# Patient Record
Sex: MALE | Race: WHITE | NOT HISPANIC OR LATINO | Employment: FULL TIME | URBAN - NONMETROPOLITAN AREA
[De-identification: names, ages, dates, MRNs, and addresses within clinical notes are randomized per-mention and may not be internally consistent; named-entity substitution may affect disease eponyms.]

---

## 2017-07-16 ENCOUNTER — HOSPITAL ENCOUNTER (EMERGENCY)
Facility: HOSPITAL | Age: 32
Discharge: HOME | End: 2017-07-16
Admitting: FAMILY MEDICINE

## 2017-07-16 VITALS
DIASTOLIC BLOOD PRESSURE: 75 MMHG | HEART RATE: 88 BPM | BODY MASS INDEX: 35.25 KG/M2 | SYSTOLIC BLOOD PRESSURE: 134 MMHG | TEMPERATURE: 97.8 F | OXYGEN SATURATION: 99 % | RESPIRATION RATE: 16 BRPM | WEIGHT: 232.6 LBS | HEIGHT: 68 IN

## 2017-07-16 DIAGNOSIS — H60.332 ACUTE SWIMMER'S EAR OF LEFT SIDE: Primary | ICD-10-CM

## 2017-07-16 PROCEDURE — 99282 EMERGENCY DEPT VISIT SF MDM: CPT

## 2017-07-16 RX ORDER — CIPROFLOXACIN AND DEXAMETHASONE 3; 1 MG/ML; MG/ML
4 SUSPENSION/ DROPS AURICULAR (OTIC) 2 TIMES DAILY
Qty: 7.5 ML | Refills: 0 | Status: SHIPPED | OUTPATIENT
Start: 2017-07-16 | End: 2017-07-23

## 2017-07-17 NOTE — ED PROVIDER NOTES
Subjective   Patient is a 31 y.o. male presenting with ear pain.   History provided by:  Patient   used: No    Earache   Location:  Left  Behind ear:  No abnormality  Quality:  Aching  Severity:  Mild  Onset quality:  Gradual  Duration:  3 days  Timing:  Constant  Progression:  Worsening  Chronicity:  New  Context: water in ear    Context: not direct blow, not elevation change, not foreign body in ear and not loud noise    Relieved by:  Nothing  Worsened by:  Nothing  Ineffective treatments:  OTC medications  Associated symptoms: ear discharge    Associated symptoms: no abdominal pain, no congestion, no cough, no diarrhea, no headaches, no hearing loss, no neck pain, no sore throat, no tinnitus and no vomiting        Review of Systems   Constitutional: Negative.    HENT: Positive for ear discharge and ear pain. Negative for congestion, hearing loss, sore throat and tinnitus.    Eyes: Negative.    Respiratory: Negative for cough.    Cardiovascular: Negative.    Gastrointestinal: Negative for abdominal pain, diarrhea and vomiting.   Endocrine: Negative.    Genitourinary: Negative.    Musculoskeletal: Negative.  Negative for neck pain.   Skin: Negative.    Allergic/Immunologic: Negative.    Neurological: Negative for headaches.   Hematological: Negative.    Psychiatric/Behavioral: Negative.        Past Medical History:   Diagnosis Date   • Kidney stone        No Known Allergies    History reviewed. No pertinent surgical history.    History reviewed. No pertinent family history.    Social History     Social History   • Marital status: Single     Spouse name: N/A   • Number of children: N/A   • Years of education: N/A     Social History Main Topics   • Smoking status: Unknown If Ever Smoked   • Smokeless tobacco: None   • Alcohol use None   • Drug use: None   • Sexual activity: Not Asked     Other Topics Concern   • None     Social History Narrative   • None           Objective   Physical Exam    Constitutional: He appears well-developed and well-nourished. No distress.   HENT:   Head: Normocephalic and atraumatic.   Right Ear: External ear normal.   Left Ear: No lacerations. There is drainage and swelling. No foreign bodies. Tympanic membrane is not perforated, not erythematous, not retracted and not bulging.   Mouth/Throat: Oropharynx is clear and moist.   Eyes: EOM are normal. Pupils are equal, round, and reactive to light.   Cardiovascular: Normal rate and regular rhythm.  Exam reveals no friction rub.    No murmur heard.  Pulmonary/Chest: Effort normal. No respiratory distress. He has no wheezes.   Neurological: He is alert.   Skin: He is not diaphoretic.   Nursing note and vitals reviewed.      Procedures         ED Course  ED Course                  MDM    Final diagnoses:   Acute swimmer's ear of left side            Higinio Martinez PA-C  07/16/17 1923

## 2021-07-22 ENCOUNTER — APPOINTMENT (OUTPATIENT)
Dept: GENERAL RADIOLOGY | Age: 36
DRG: 871 | End: 2021-07-22
Attending: INTERNAL MEDICINE
Payer: COMMERCIAL

## 2021-07-22 ENCOUNTER — HOSPITAL ENCOUNTER (INPATIENT)
Age: 36
LOS: 3 days | Discharge: ANOTHER HOSPITAL | DRG: 871 | End: 2021-07-25
Attending: INTERNAL MEDICINE
Payer: COMMERCIAL

## 2021-07-22 LAB
ALBUMIN SERPL-MCNC: 3.8 G/DL (ref 3.5–5.2)
ALP BLD-CCNC: 56 U/L (ref 40–130)
ALT SERPL-CCNC: 79 U/L (ref 5–41)
ANION GAP SERPL CALCULATED.3IONS-SCNC: 16 MMOL/L (ref 7–19)
AST SERPL-CCNC: 219 U/L (ref 5–40)
BASOPHILS ABSOLUTE: 0 K/UL (ref 0–0.2)
BASOPHILS RELATIVE PERCENT: 0.1 % (ref 0–1)
BILIRUB SERPL-MCNC: 0.4 MG/DL (ref 0.2–1.2)
BUN BLDV-MCNC: 29 MG/DL (ref 6–20)
C-REACTIVE PROTEIN: 9.82 MG/DL (ref 0–0.5)
CALCIUM SERPL-MCNC: 8.6 MG/DL (ref 8.6–10)
CHLORIDE BLD-SCNC: 95 MMOL/L (ref 98–111)
CO2: 23 MMOL/L (ref 22–29)
CREAT SERPL-MCNC: 1.7 MG/DL (ref 0.5–1.2)
EOSINOPHILS ABSOLUTE: 0 K/UL (ref 0–0.6)
EOSINOPHILS RELATIVE PERCENT: 0 % (ref 0–5)
GFR AFRICAN AMERICAN: 56
GFR NON-AFRICAN AMERICAN: 46
GLUCOSE BLD-MCNC: 166 MG/DL (ref 74–109)
HCT VFR BLD CALC: 41.7 % (ref 42–52)
HEMOGLOBIN: 14.4 G/DL (ref 14–18)
IMMATURE GRANULOCYTES #: 0.1 K/UL
LACTIC ACID: 1.6 MMOL/L (ref 0.5–1.9)
LYMPHOCYTES ABSOLUTE: 0.8 K/UL (ref 1.1–4.5)
LYMPHOCYTES RELATIVE PERCENT: 11 % (ref 20–40)
MCH RBC QN AUTO: 30.2 PG (ref 27–31)
MCHC RBC AUTO-ENTMCNC: 34.5 G/DL (ref 33–37)
MCV RBC AUTO: 87.4 FL (ref 80–94)
MONOCYTES ABSOLUTE: 0.2 K/UL (ref 0–0.9)
MONOCYTES RELATIVE PERCENT: 3.3 % (ref 0–10)
NEUTROPHILS ABSOLUTE: 6.2 K/UL (ref 1.5–7.5)
NEUTROPHILS RELATIVE PERCENT: 84.6 % (ref 50–65)
PDW BLD-RTO: 13.5 % (ref 11.5–14.5)
PLATELET # BLD: 179 K/UL (ref 130–400)
PMV BLD AUTO: 10 FL (ref 9.4–12.4)
POTASSIUM REFLEX MAGNESIUM: 4.1 MMOL/L (ref 3.5–5)
RBC # BLD: 4.77 M/UL (ref 4.7–6.1)
SODIUM BLD-SCNC: 134 MMOL/L (ref 136–145)
TOTAL PROTEIN: 7.4 G/DL (ref 6.6–8.7)
WBC # BLD: 7.3 K/UL (ref 4.8–10.8)

## 2021-07-22 PROCEDURE — 87040 BLOOD CULTURE FOR BACTERIA: CPT

## 2021-07-22 PROCEDURE — 94002 VENT MGMT INPAT INIT DAY: CPT

## 2021-07-22 PROCEDURE — 71045 X-RAY EXAM CHEST 1 VIEW: CPT

## 2021-07-22 PROCEDURE — 2700000000 HC OXYGEN THERAPY PER DAY

## 2021-07-22 PROCEDURE — 81001 URINALYSIS AUTO W/SCOPE: CPT

## 2021-07-22 PROCEDURE — 6360000002 HC RX W HCPCS: Performed by: INTERNAL MEDICINE

## 2021-07-22 PROCEDURE — 5A1935Z RESPIRATORY VENTILATION, LESS THAN 24 CONSECUTIVE HOURS: ICD-10-PCS | Performed by: STUDENT IN AN ORGANIZED HEALTH CARE EDUCATION/TRAINING PROGRAM

## 2021-07-22 PROCEDURE — 2100000000 HC CCU R&B

## 2021-07-22 RX ORDER — MIDAZOLAM IN NACL,ISO-OSMOT/PF 50 MG/50ML
1-10 INFUSION BOTTLE (ML) INTRAVENOUS CONTINUOUS
Status: DISCONTINUED | OUTPATIENT
Start: 2021-07-22 | End: 2021-07-24

## 2021-07-22 RX ORDER — PROPOFOL 10 MG/ML
5-50 INJECTION, EMULSION INTRAVENOUS
Status: DISCONTINUED | OUTPATIENT
Start: 2021-07-22 | End: 2021-07-25 | Stop reason: HOSPADM

## 2021-07-22 RX ADMIN — Medication 75 MCG/HR: at 21:50

## 2021-07-22 RX ADMIN — Medication 4 MG/HR: at 21:49

## 2021-07-22 RX ADMIN — PROPOFOL 10 MCG/KG/MIN: 10 INJECTION, EMULSION INTRAVENOUS at 22:15

## 2021-07-22 ASSESSMENT — PULMONARY FUNCTION TESTS
PIF_VALUE: 33
PIF_VALUE: 35

## 2021-07-23 PROBLEM — J80 ACUTE RESPIRATORY DISTRESS SYNDROME IN ADULT (HCC): Status: ACTIVE | Noted: 2021-07-23

## 2021-07-23 PROBLEM — N17.9 ACUTE KIDNEY INJURY (HCC): Status: ACTIVE | Noted: 2021-07-23

## 2021-07-23 PROBLEM — E66.01 MORBID EXOGENOUS OBESITY (HCC): Status: ACTIVE | Noted: 2021-07-23

## 2021-07-23 PROBLEM — J12.82 PNEUMONIA DUE TO COVID-19 VIRUS: Status: ACTIVE | Noted: 2021-07-23

## 2021-07-23 PROBLEM — R09.02 HYPOXEMIA: Status: ACTIVE | Noted: 2021-07-23

## 2021-07-23 PROBLEM — N39.0 UTI (URINARY TRACT INFECTION): Status: ACTIVE | Noted: 2021-07-23

## 2021-07-23 PROBLEM — R79.89 ELEVATED D-DIMER: Status: ACTIVE | Noted: 2021-07-23

## 2021-07-23 PROBLEM — J96.00 ACUTE RESPIRATORY FAILURE DUE TO COVID-19 (HCC): Status: ACTIVE | Noted: 2021-07-23

## 2021-07-23 PROBLEM — U07.1 ACUTE RESPIRATORY FAILURE DUE TO COVID-19 (HCC): Status: ACTIVE | Noted: 2021-07-23

## 2021-07-23 PROBLEM — U07.1 PNEUMONIA DUE TO COVID-19 VIRUS: Status: ACTIVE | Noted: 2021-07-23

## 2021-07-23 PROBLEM — Z51.5 PALLIATIVE CARE PATIENT: Status: ACTIVE | Noted: 2021-07-23

## 2021-07-23 PROBLEM — J45.909 ASTHMA: Status: ACTIVE | Noted: 2021-07-23

## 2021-07-23 LAB
ALBUMIN SERPL-MCNC: 3.3 G/DL (ref 3.5–5.2)
ALP BLD-CCNC: 51 U/L (ref 40–130)
ALT SERPL-CCNC: 73 U/L (ref 5–41)
ANION GAP SERPL CALCULATED.3IONS-SCNC: 14 MMOL/L (ref 7–19)
AST SERPL-CCNC: 204 U/L (ref 5–40)
BACTERIA: NEGATIVE /HPF
BASE EXCESS ARTERIAL: -2.9 MMOL/L (ref -2–2)
BILIRUB SERPL-MCNC: 0.4 MG/DL (ref 0.2–1.2)
BILIRUBIN URINE: ABNORMAL
BLOOD, URINE: ABNORMAL
BUN BLDV-MCNC: 35 MG/DL (ref 6–20)
CALCIUM SERPL-MCNC: 8.1 MG/DL (ref 8.6–10)
CARBOXYHEMOGLOBIN ARTERIAL: 2.1 % (ref 0–5)
CHLORIDE BLD-SCNC: 95 MMOL/L (ref 98–111)
CLARITY: ABNORMAL
CO2: 21 MMOL/L (ref 22–29)
COLOR: ABNORMAL
CREAT SERPL-MCNC: 1.7 MG/DL (ref 0.5–1.2)
CRYSTALS, UA: ABNORMAL /HPF
D DIMER: 13.54 UG/ML FEU (ref 0–0.48)
EPITHELIAL CELLS, UA: 6 /HPF (ref 0–5)
GFR AFRICAN AMERICAN: 56
GFR NON-AFRICAN AMERICAN: 46
GLUCOSE BLD-MCNC: 170 MG/DL (ref 70–99)
GLUCOSE BLD-MCNC: 171 MG/DL (ref 70–99)
GLUCOSE BLD-MCNC: 194 MG/DL (ref 70–99)
GLUCOSE BLD-MCNC: 223 MG/DL (ref 74–109)
GLUCOSE URINE: NEGATIVE MG/DL
HBA1C MFR BLD: 5.6 % (ref 4–6)
HCO3 ARTERIAL: 22 MMOL/L (ref 22–26)
HCT VFR BLD CALC: 40.1 % (ref 42–52)
HEMOGLOBIN, ART, EXTENDED: 13.9 G/DL (ref 14–18)
HEMOGLOBIN: 13.6 G/DL (ref 14–18)
HYALINE CASTS: 41 /HPF (ref 0–8)
KETONES, URINE: ABNORMAL MG/DL
L. PNEUMOPHILA SEROGP 1 UR AG: NORMAL
LEUKOCYTE ESTERASE, URINE: ABNORMAL
MCH RBC QN AUTO: 30.4 PG (ref 27–31)
MCHC RBC AUTO-ENTMCNC: 33.9 G/DL (ref 33–37)
MCV RBC AUTO: 89.5 FL (ref 80–94)
METHEMOGLOBIN ARTERIAL: 1 %
NITRITE, URINE: NEGATIVE
O2 CONTENT ARTERIAL: 17.3 ML/DL
O2 SAT, ARTERIAL: 88.6 %
O2 THERAPY: ABNORMAL
PCO2 ARTERIAL: 38 MMHG (ref 35–45)
PDW BLD-RTO: 13.7 % (ref 11.5–14.5)
PERFORMED ON: ABNORMAL
PH ARTERIAL: 7.37 (ref 7.35–7.45)
PH UA: 5 (ref 5–8)
PLATELET # BLD: 167 K/UL (ref 130–400)
PMV BLD AUTO: 10 FL (ref 9.4–12.4)
PO2 ARTERIAL: 55 MMHG (ref 80–100)
POTASSIUM SERPL-SCNC: 3.9 MMOL/L (ref 3.5–5)
POTASSIUM, WHOLE BLOOD: 3.9
PROCALCITONIN: 4.57 NG/ML (ref 0–0.09)
PROTEIN UA: 300 MG/DL
RBC # BLD: 4.48 M/UL (ref 4.7–6.1)
RBC UA: 3 /HPF (ref 0–4)
SODIUM BLD-SCNC: 130 MMOL/L (ref 136–145)
SPECIFIC GRAVITY UA: 1.02 (ref 1–1.03)
STREP PNEUMONIAE ANTIGEN, URINE: NORMAL
TOTAL PROTEIN: 6.8 G/DL (ref 6.6–8.7)
UROBILINOGEN, URINE: 1 E.U./DL
WBC # BLD: 6.5 K/UL (ref 4.8–10.8)
WBC UA: 6 /HPF (ref 0–5)

## 2021-07-23 PROCEDURE — 2700000000 HC OXYGEN THERAPY PER DAY

## 2021-07-23 PROCEDURE — 6370000000 HC RX 637 (ALT 250 FOR IP): Performed by: STUDENT IN AN ORGANIZED HEALTH CARE EDUCATION/TRAINING PROGRAM

## 2021-07-23 PROCEDURE — 36600 WITHDRAWAL OF ARTERIAL BLOOD: CPT

## 2021-07-23 PROCEDURE — 87449 NOS EACH ORGANISM AG IA: CPT

## 2021-07-23 PROCEDURE — 82947 ASSAY GLUCOSE BLOOD QUANT: CPT

## 2021-07-23 PROCEDURE — 83605 ASSAY OF LACTIC ACID: CPT

## 2021-07-23 PROCEDURE — 84145 PROCALCITONIN (PCT): CPT

## 2021-07-23 PROCEDURE — 6370000000 HC RX 637 (ALT 250 FOR IP): Performed by: INTERNAL MEDICINE

## 2021-07-23 PROCEDURE — 2580000003 HC RX 258: Performed by: INTERNAL MEDICINE

## 2021-07-23 PROCEDURE — 82803 BLOOD GASES ANY COMBINATION: CPT

## 2021-07-23 PROCEDURE — 94003 VENT MGMT INPAT SUBQ DAY: CPT

## 2021-07-23 PROCEDURE — 2500000003 HC RX 250 WO HCPCS: Performed by: INTERNAL MEDICINE

## 2021-07-23 PROCEDURE — C9113 INJ PANTOPRAZOLE SODIUM, VIA: HCPCS | Performed by: STUDENT IN AN ORGANIZED HEALTH CARE EDUCATION/TRAINING PROGRAM

## 2021-07-23 PROCEDURE — 6360000002 HC RX W HCPCS: Performed by: STUDENT IN AN ORGANIZED HEALTH CARE EDUCATION/TRAINING PROGRAM

## 2021-07-23 PROCEDURE — 85025 COMPLETE CBC W/AUTO DIFF WBC: CPT

## 2021-07-23 PROCEDURE — 87070 CULTURE OTHR SPECIMN AEROBIC: CPT

## 2021-07-23 PROCEDURE — 89220 SPUTUM SPECIMEN COLLECTION: CPT

## 2021-07-23 PROCEDURE — 87186 SC STD MICRODIL/AGAR DIL: CPT

## 2021-07-23 PROCEDURE — 85379 FIBRIN DEGRADATION QUANT: CPT

## 2021-07-23 PROCEDURE — 86140 C-REACTIVE PROTEIN: CPT

## 2021-07-23 PROCEDURE — 6360000002 HC RX W HCPCS: Performed by: INTERNAL MEDICINE

## 2021-07-23 PROCEDURE — 94640 AIRWAY INHALATION TREATMENT: CPT

## 2021-07-23 PROCEDURE — 84132 ASSAY OF SERUM POTASSIUM: CPT

## 2021-07-23 PROCEDURE — 83036 HEMOGLOBIN GLYCOSYLATED A1C: CPT

## 2021-07-23 PROCEDURE — 87205 SMEAR GRAM STAIN: CPT

## 2021-07-23 PROCEDURE — XW043H5 INTRODUCTION OF TOCILIZUMAB INTO CENTRAL VEIN, PERCUTANEOUS APPROACH, NEW TECHNOLOGY GROUP 5: ICD-10-PCS | Performed by: ANESTHESIOLOGY

## 2021-07-23 PROCEDURE — 86403 PARTICLE AGGLUT ANTBDY SCRN: CPT

## 2021-07-23 PROCEDURE — 80053 COMPREHEN METABOLIC PANEL: CPT

## 2021-07-23 PROCEDURE — 87086 URINE CULTURE/COLONY COUNT: CPT

## 2021-07-23 PROCEDURE — 85027 COMPLETE CBC AUTOMATED: CPT

## 2021-07-23 PROCEDURE — 2100000000 HC CCU R&B

## 2021-07-23 PROCEDURE — 2580000003 HC RX 258: Performed by: STUDENT IN AN ORGANIZED HEALTH CARE EDUCATION/TRAINING PROGRAM

## 2021-07-23 RX ORDER — SODIUM CHLORIDE 9 MG/ML
25 INJECTION, SOLUTION INTRAVENOUS PRN
Status: DISCONTINUED | OUTPATIENT
Start: 2021-07-23 | End: 2021-07-25 | Stop reason: HOSPADM

## 2021-07-23 RX ORDER — FAMOTIDINE 20 MG/1
20 TABLET, FILM COATED ORAL 2 TIMES DAILY
Status: ON HOLD | COMMUNITY
End: 2021-07-24 | Stop reason: HOSPADM

## 2021-07-23 RX ORDER — SODIUM CHLORIDE 0.9 % (FLUSH) 0.9 %
5-40 SYRINGE (ML) INJECTION EVERY 12 HOURS SCHEDULED
Status: DISCONTINUED | OUTPATIENT
Start: 2021-07-23 | End: 2021-07-25 | Stop reason: HOSPADM

## 2021-07-23 RX ORDER — ALBUTEROL SULFATE 90 UG/1
2 AEROSOL, METERED RESPIRATORY (INHALATION) EVERY 6 HOURS PRN
Status: DISCONTINUED | OUTPATIENT
Start: 2021-07-23 | End: 2021-07-25 | Stop reason: HOSPADM

## 2021-07-23 RX ORDER — ACETAMINOPHEN 650 MG/1
650 SUPPOSITORY RECTAL EVERY 6 HOURS PRN
Status: DISCONTINUED | OUTPATIENT
Start: 2021-07-23 | End: 2021-07-25 | Stop reason: HOSPADM

## 2021-07-23 RX ORDER — SODIUM CHLORIDE, SODIUM LACTATE, POTASSIUM CHLORIDE, AND CALCIUM CHLORIDE .6; .31; .03; .02 G/100ML; G/100ML; G/100ML; G/100ML
500 INJECTION, SOLUTION INTRAVENOUS ONCE
Status: COMPLETED | OUTPATIENT
Start: 2021-07-23 | End: 2021-07-23

## 2021-07-23 RX ORDER — DEXTROSE MONOHYDRATE 50 MG/ML
100 INJECTION, SOLUTION INTRAVENOUS PRN
Status: DISCONTINUED | OUTPATIENT
Start: 2021-07-23 | End: 2021-07-23 | Stop reason: SDUPTHER

## 2021-07-23 RX ORDER — BUDESONIDE AND FORMOTEROL FUMARATE DIHYDRATE 160; 4.5 UG/1; UG/1
2 AEROSOL RESPIRATORY (INHALATION) 2 TIMES DAILY
Status: DISCONTINUED | OUTPATIENT
Start: 2021-07-23 | End: 2021-07-24

## 2021-07-23 RX ORDER — SODIUM CHLORIDE 0.9 % (FLUSH) 0.9 %
5-40 SYRINGE (ML) INJECTION PRN
Status: DISCONTINUED | OUTPATIENT
Start: 2021-07-23 | End: 2021-07-25 | Stop reason: HOSPADM

## 2021-07-23 RX ORDER — 0.9 % SODIUM CHLORIDE 0.9 %
1000 INTRAVENOUS SOLUTION INTRAVENOUS ONCE
Status: COMPLETED | OUTPATIENT
Start: 2021-07-23 | End: 2021-07-23

## 2021-07-23 RX ORDER — ONDANSETRON 2 MG/ML
4 INJECTION INTRAMUSCULAR; INTRAVENOUS EVERY 6 HOURS PRN
Status: DISCONTINUED | OUTPATIENT
Start: 2021-07-23 | End: 2021-07-25 | Stop reason: HOSPADM

## 2021-07-23 RX ORDER — NICOTINE POLACRILEX 4 MG
15 LOZENGE BUCCAL PRN
Status: DISCONTINUED | OUTPATIENT
Start: 2021-07-23 | End: 2021-07-25 | Stop reason: HOSPADM

## 2021-07-23 RX ORDER — SODIUM CHLORIDE 9 MG/ML
10 INJECTION INTRAVENOUS DAILY
Status: DISCONTINUED | OUTPATIENT
Start: 2021-07-23 | End: 2021-07-25 | Stop reason: HOSPADM

## 2021-07-23 RX ORDER — ACETAMINOPHEN 325 MG/1
650 TABLET ORAL EVERY 6 HOURS PRN
Status: DISCONTINUED | OUTPATIENT
Start: 2021-07-23 | End: 2021-07-25 | Stop reason: HOSPADM

## 2021-07-23 RX ORDER — ALBUTEROL SULFATE 90 UG/1
2 AEROSOL, METERED RESPIRATORY (INHALATION) EVERY 4 HOURS
Status: DISCONTINUED | OUTPATIENT
Start: 2021-07-23 | End: 2021-07-23

## 2021-07-23 RX ORDER — SODIUM CHLORIDE, SODIUM LACTATE, POTASSIUM CHLORIDE, AND CALCIUM CHLORIDE .6; .31; .03; .02 G/100ML; G/100ML; G/100ML; G/100ML
500 INJECTION, SOLUTION INTRAVENOUS ONCE
Status: DISCONTINUED | OUTPATIENT
Start: 2021-07-23 | End: 2021-07-23

## 2021-07-23 RX ORDER — DEXAMETHASONE SODIUM PHOSPHATE 10 MG/ML
6 INJECTION, SOLUTION INTRAMUSCULAR; INTRAVENOUS EVERY 12 HOURS
Status: DISCONTINUED | OUTPATIENT
Start: 2021-07-23 | End: 2021-07-25 | Stop reason: HOSPADM

## 2021-07-23 RX ORDER — IPRATROPIUM BROMIDE AND ALBUTEROL SULFATE 2.5; .5 MG/3ML; MG/3ML
1 SOLUTION RESPIRATORY (INHALATION) 4 TIMES DAILY
Status: DISCONTINUED | OUTPATIENT
Start: 2021-07-23 | End: 2021-07-25 | Stop reason: HOSPADM

## 2021-07-23 RX ORDER — DEXTROSE MONOHYDRATE 25 G/50ML
12.5 INJECTION, SOLUTION INTRAVENOUS PRN
Status: DISCONTINUED | OUTPATIENT
Start: 2021-07-23 | End: 2021-07-25 | Stop reason: HOSPADM

## 2021-07-23 RX ORDER — DEXTROSE MONOHYDRATE 25 G/50ML
12.5 INJECTION, SOLUTION INTRAVENOUS PRN
Status: DISCONTINUED | OUTPATIENT
Start: 2021-07-23 | End: 2021-07-23 | Stop reason: SDUPTHER

## 2021-07-23 RX ORDER — POLYETHYLENE GLYCOL 3350 17 G/17G
17 POWDER, FOR SOLUTION ORAL DAILY PRN
Status: DISCONTINUED | OUTPATIENT
Start: 2021-07-23 | End: 2021-07-25 | Stop reason: HOSPADM

## 2021-07-23 RX ORDER — DEXAMETHASONE SODIUM PHOSPHATE 10 MG/ML
6 INJECTION, SOLUTION INTRAMUSCULAR; INTRAVENOUS EVERY 24 HOURS
Status: DISCONTINUED | OUTPATIENT
Start: 2021-07-23 | End: 2021-07-23

## 2021-07-23 RX ORDER — PANTOPRAZOLE SODIUM 40 MG/10ML
40 INJECTION, POWDER, LYOPHILIZED, FOR SOLUTION INTRAVENOUS DAILY
Status: DISCONTINUED | OUTPATIENT
Start: 2021-07-23 | End: 2021-07-25 | Stop reason: HOSPADM

## 2021-07-23 RX ORDER — NICOTINE POLACRILEX 4 MG
15 LOZENGE BUCCAL PRN
Status: DISCONTINUED | OUTPATIENT
Start: 2021-07-23 | End: 2021-07-23 | Stop reason: SDUPTHER

## 2021-07-23 RX ORDER — DEXTROSE MONOHYDRATE 50 MG/ML
100 INJECTION, SOLUTION INTRAVENOUS PRN
Status: DISCONTINUED | OUTPATIENT
Start: 2021-07-23 | End: 2021-07-25 | Stop reason: HOSPADM

## 2021-07-23 RX ADMIN — PROPOFOL 35 MCG/KG/MIN: 10 INJECTION, EMULSION INTRAVENOUS at 04:33

## 2021-07-23 RX ADMIN — PROPOFOL 50 MCG/KG/MIN: 10 INJECTION, EMULSION INTRAVENOUS at 21:00

## 2021-07-23 RX ADMIN — PROPOFOL 40 MCG/KG/MIN: 10 INJECTION, EMULSION INTRAVENOUS at 11:33

## 2021-07-23 RX ADMIN — IPRATROPIUM BROMIDE AND ALBUTEROL SULFATE 1 AMPULE: .5; 3 SOLUTION RESPIRATORY (INHALATION) at 19:58

## 2021-07-23 RX ADMIN — PROPOFOL 35 MCG/KG/MIN: 10 INJECTION, EMULSION INTRAVENOUS at 08:33

## 2021-07-23 RX ADMIN — IPRATROPIUM BROMIDE AND ALBUTEROL SULFATE 1 AMPULE: .5; 3 SOLUTION RESPIRATORY (INHALATION) at 15:50

## 2021-07-23 RX ADMIN — BUDESONIDE AND FORMOTEROL FUMARATE DIHYDRATE 2 PUFF: 160; 4.5 AEROSOL RESPIRATORY (INHALATION) at 11:26

## 2021-07-23 RX ADMIN — PIPERACILLIN AND TAZOBACTAM 3375 MG: 3; .375 INJECTION, POWDER, LYOPHILIZED, FOR SOLUTION INTRAVENOUS at 11:26

## 2021-07-23 RX ADMIN — PANTOPRAZOLE SODIUM 40 MG: 40 INJECTION, POWDER, FOR SOLUTION INTRAVENOUS at 08:48

## 2021-07-23 RX ADMIN — ALBUTEROL SULFATE 2 PUFF: 90 AEROSOL, METERED RESPIRATORY (INHALATION) at 11:27

## 2021-07-23 RX ADMIN — PIPERACILLIN AND TAZOBACTAM 3375 MG: 3; .375 INJECTION, POWDER, LYOPHILIZED, FOR SOLUTION INTRAVENOUS at 03:30

## 2021-07-23 RX ADMIN — ENOXAPARIN SODIUM 135 MG: 150 INJECTION SUBCUTANEOUS at 01:05

## 2021-07-23 RX ADMIN — SODIUM CHLORIDE, POTASSIUM CHLORIDE, SODIUM LACTATE AND CALCIUM CHLORIDE 500 ML: 600; 310; 30; 20 INJECTION, SOLUTION INTRAVENOUS at 14:04

## 2021-07-23 RX ADMIN — PIPERACILLIN AND TAZOBACTAM 3375 MG: 3; .375 INJECTION, POWDER, LYOPHILIZED, FOR SOLUTION INTRAVENOUS at 18:04

## 2021-07-23 RX ADMIN — SODIUM CHLORIDE, PRESERVATIVE FREE 10 ML: 5 INJECTION INTRAVENOUS at 21:01

## 2021-07-23 RX ADMIN — FAMOTIDINE 20 MG: 10 INJECTION, SOLUTION INTRAVENOUS at 01:05

## 2021-07-23 RX ADMIN — SODIUM CHLORIDE, PRESERVATIVE FREE 10 ML: 5 INJECTION INTRAVENOUS at 09:24

## 2021-07-23 RX ADMIN — SODIUM CHLORIDE 1000 ML: 9 INJECTION, SOLUTION INTRAVENOUS at 00:56

## 2021-07-23 RX ADMIN — PROPOFOL 45 MCG/KG/MIN: 10 INJECTION, EMULSION INTRAVENOUS at 18:24

## 2021-07-23 RX ADMIN — INSULIN LISPRO 1 UNITS: 100 INJECTION, SOLUTION INTRAVENOUS; SUBCUTANEOUS at 12:45

## 2021-07-23 RX ADMIN — PROPOFOL 45 MCG/KG/MIN: 10 INJECTION, EMULSION INTRAVENOUS at 15:33

## 2021-07-23 RX ADMIN — DEXAMETHASONE SODIUM PHOSPHATE 6 MG: 10 INJECTION, SOLUTION INTRAMUSCULAR; INTRAVENOUS at 01:05

## 2021-07-23 RX ADMIN — PROPOFOL 35 MCG/KG/MIN: 10 INJECTION, EMULSION INTRAVENOUS at 00:46

## 2021-07-23 RX ADMIN — ENOXAPARIN SODIUM 135 MG: 150 INJECTION SUBCUTANEOUS at 12:39

## 2021-07-23 RX ADMIN — AZITHROMYCIN MONOHYDRATE 500 MG: 500 INJECTION, POWDER, LYOPHILIZED, FOR SOLUTION INTRAVENOUS at 01:05

## 2021-07-23 RX ADMIN — SODIUM CHLORIDE, PRESERVATIVE FREE 10 ML: 5 INJECTION INTRAVENOUS at 08:48

## 2021-07-23 RX ADMIN — Medication 5 MG/HR: at 11:33

## 2021-07-23 RX ADMIN — TOCILIZUMAB 800 MG: 20 INJECTION, SOLUTION, CONCENTRATE INTRAVENOUS at 08:15

## 2021-07-23 RX ADMIN — DEXAMETHASONE SODIUM PHOSPHATE 6 MG: 10 INJECTION, SOLUTION INTRAMUSCULAR; INTRAVENOUS at 14:05

## 2021-07-23 RX ADMIN — PROPOFOL 50 MCG/KG/MIN: 10 INJECTION, EMULSION INTRAVENOUS at 23:36

## 2021-07-23 RX ADMIN — BUDESONIDE AND FORMOTEROL FUMARATE DIHYDRATE 2 PUFF: 160; 4.5 AEROSOL RESPIRATORY (INHALATION) at 20:00

## 2021-07-23 ASSESSMENT — PULMONARY FUNCTION TESTS
PIF_VALUE: 34
PIF_VALUE: 30
PIF_VALUE: 32
PIF_VALUE: 32
PIF_VALUE: 31
PIF_VALUE: 30
PIF_VALUE: 32
PIF_VALUE: 34
PIF_VALUE: 29
PIF_VALUE: 25
PIF_VALUE: 34
PIF_VALUE: 34
PIF_VALUE: 31
PIF_VALUE: 30
PIF_VALUE: 18
PIF_VALUE: 26
PIF_VALUE: 33
PIF_VALUE: 33
PIF_VALUE: 29
PIF_VALUE: 26
PIF_VALUE: 34
PIF_VALUE: 29
PIF_VALUE: 34
PIF_VALUE: 34
PIF_VALUE: 33
PIF_VALUE: 28
PIF_VALUE: 33
PIF_VALUE: 33
PIF_VALUE: 29

## 2021-07-23 ASSESSMENT — PAIN SCALES - GENERAL: PAINLEVEL_OUTOF10: 1

## 2021-07-23 NOTE — PLAN OF CARE
Nutrition Problem #1: Inadequate oral intake  Intervention: Food and/or Nutrient Delivery: Start Tube Feeding, Start Oral Nutrition Supplement  Nutritional Goals: meet nutritional needs through EN

## 2021-07-23 NOTE — ACP (ADVANCE CARE PLANNING)
Advance Care Planning     Advance Care Planning Activator (Inpatient)  Conversation Note      Date of ACP Conversation: 7/23/2021     [employer] with: Patient's wife and sister    ACP Activator: Mike Nix      Current Designated Health Care Decision Maker:     Primary Decision Maker: Michael Vera  [phone]    Supplemental (Other) Decision Maker: Jensen Leigh - Brother/Sister - [phone]      Care Preferences    Ventilation: \"If you were in your present state of health and suddenly became very ill and were unable to breathe on your own, what would your preference be about the use of a ventilator (breathing machine) if it were available to you? \"      Would the patient desire the use of ventilator (breathing machine)?: Yes    \"If your health worsens and it becomes clear that your chance of recovery is unlikely, what would your preference be about the use of a ventilator (breathing machine) if it were available to you? \"     Would the patient desire the use of ventilator (breathing machine)?: Yes      Resuscitation  \"CPR works best to restart the heart when there is a sudden event, like a heart attack, in someone who is otherwise healthy. Unfortunately, CPR does not typically restart the heart for people who have serious health conditions or who are very sick. \"    \"In the event your heart stopped as a result of an underlying serious health condition, would you want attempts to be made to restart your heart (answer \"yes\" for attempt to resuscitate) or would you prefer a natural death (answer \"no\" for do not attempt to resuscitate)? \" Yes       [] Yes   [x] No   Educated Patient / Asia Tejeda regarding differences between Advance Directives and portable DNR orders.         Conversation Outcomes:  [x] ACP discussion completed    Electronically signed by Mike Nix on 7/23/2021 at 11:15 AM

## 2021-07-23 NOTE — PROGRESS NOTES
Spoke with pt's wife, Louise to get consent for central line placement. Phone consent given and verified by Sanaz Hairston RN.      Electronically signed by Kristina Lizarraga RN on 7/23/2021 at 8:30 AM

## 2021-07-23 NOTE — PROGRESS NOTES
Spoke with pt's wife and sister on the phone to initiate palliative care for support. Pt is Covid positive. Pt's wife and sister says they have four in their family who have tested positive, two awaiting results, and several others with symptoms. Pt's sister says both parents of patient have tested positive. Pt was transferred from another hospital on Ventilator. Palliative to follow for support.      Electronically signed by Sherie Farah on 7/23/2021 at 11:11 AM

## 2021-07-23 NOTE — CONSULTS
**Physician Signature**  This document was electronically signed by: Krysten Santiago MD  2021   10:44 AM    **Consult Information**  Member Facility: [address] [place] [address] MRN: [id number]  Visit/Encounter Number: [id number]  Consult ID: [id number]  Facility Time Zone: CT  Date and Time of Request: 2021 09:48 AM  CT  Requesting Clinician: DR. Antonieta Toledo  Patient Name: Yudy Rosas  YOB: 1985  Gender: Male  Patient identity was confirmed at the beginning of the consult with the   patient/family/staff using two personal identifiers: Patient name and       **Reason for Consult**  Reason for Consult: [facility]    **Admission**  Admission Date: 2021  Chief reason for ICU admission: COVID - 19    **Core Metrics**  General orienting sentence for patient: 38 yo xfer from [facility] + on . Sats 40s. PaO2 now 55. ON antibiotics zithromax BAM.   remdesivir.   Fent versed and lovenox  Chief physiologic deterioration: Increase in FiO2 required by   30%  Is the patient on DVT prophylaxis?: Yes  Prophylaxis type: Pharmacological  DVT Prophylaxis Comments: Lovenox  Is the patient on GI prophylaxis?: Yes  Has this patient reached their nutritional goal?: No and issues are being   addressed  Are there current issues with pain management in this patient?:   No  Are there issues with skin integrity?: No  Are there issues with delirium?: No  Has the patient been mobilized?: No  Is this patient currently intubated?: Yes  Has facility initiated vent bundle?: Yes  Are there ethical or care philosophy or family issues?: No  Do you recommend an in depth evaluation?: No  Disposition Recommendations: Continue ICU level of care    **Inserted/Removed Devices**  Device Name: Endotracheal Tube  Site of insertion: Trachea  Date of insertion: 2021  Device Name: Coreas Catheter  Site of insertion: Urethra  Date of insertion: 2021    **Physician Signature**  This document was electronically signed by: Antonio Mayorga MD  07/23/2021   10:44 AM

## 2021-07-23 NOTE — PROGRESS NOTES
Pt received at 09:09 PM direct admit from [facility]. Patient was intubated at prior facility with a 7.5 ET tube secured at the 23cm melvi measured from the right lip. Pt placed on mechanical ventilation   VC 22  Vt 550  FIO2 100%  +10 PEEP  Without problems.

## 2021-07-23 NOTE — PROGRESS NOTES
36 yo M w/ asthma and obesity admitted with acute hypoxemic respiratory failure due to covid pneumonia, transferred from [address] [place] [address], required intubation due to respiratory distress at OSH. CXR with diffuse bl opacities with hypoxia refractory in setting of FiO2 100% and PEEP up to 12 concerning for severe ARDS. Suspected secondary bacterial infection and sepsis with elevated procal.      -Dexamethasone  -Zosyn for secondary/concomitant bacterial infection with sepsis  -added bronchodilators with some improvement in hypoxia but remains in mid-high 80s   -s/p Tocilizumab 7/23  -mechanical vent support, adjust tidal volume for low tidal volume strategy per ARDS net protocol  --P/F ratio <100, severe ARDS  --follow CXR and ABG  -adequately sedated on Propofol, Fentanyl, wean off Versed if able  -monitor hemodynamics, labs, inflammatory markers  --Trial Prone Positioning    Anticoagulate with lovenox BID per covid protocol    Total Critical Care Time:  37 minutes including chart review, discussion with nursing and RT, Vent management adjustments, management plan and frequent reassessment of response to treatment.     If no improvement, may be candidate for transfer for ECMO      Samson Picktet MD

## 2021-07-23 NOTE — CONSULTS
Body Weight:  ; No Adjustment   · Adjusted BMI:      · BMI Categories: Obese Class 3 (BMI 40.0 or greater)       Nutrition Diagnosis:   · Inadequate oral intake related to impaired respiratory function, acute injury/trauma as evidenced by NPO or clear liquid status due to medical condition, intubation, nutrition support - enteral nutrition      Nutrition Interventions:   Food and/or Nutrient Delivery:  Start Tube Feeding, Start Oral Nutrition Supplement  Nutrition Education/Counseling:  No recommendation at this time   Coordination of Nutrition Care:  Continue to monitor while inpatient    Goals:  meet nutritional needs through EN       Nutrition Monitoring and Evaluation:   Behavioral-Environmental Outcomes:  None Identified   Food/Nutrient Intake Outcomes:  Enteral Nutrition Intake/Tolerance  Physical Signs/Symptoms Outcomes:  Biochemical Data, Weight, Skin, Nutrition Focused Physical Findings     Discharge Planning:     Too soon to determine     Electronically signed by Ira Rey MS, RD, LD on 7/23/21 at 1:07 PM CDT    Contact: [phone]

## 2021-07-23 NOTE — PROGRESS NOTES
Pt O2 sats remained less than 90% for majority of today's shift. Discussed with MD. As pt is still requiring 100% FiO2, ability to tolerate prone positioning assess. Pt initiated placed in prone position with assist of RT and 4x RN's. Pt's O2 sat has been >92% since prone and have consistently read in the mid-90%s. Pt tolerating well; vitals have improved and maintained since flip. Family/wife updated on condition; educated on prone.      Electronically signed by Shawna Drake RN on 7/23/2021 at 5:57 PM

## 2021-07-23 NOTE — PROGRESS NOTES
4 Eyes Skin Assessment    Telma Olson is being assessed upon: Admission    I agree that I, Javy Kramer RN, along with Kati Fraire RN (either 2 RN's or 1 LPN and 1 RN) have performed a thorough Head to Toe Skin Assessment on the patient. ALL assessment sites listed below have been assessed. Areas assessed by both nurses:     [x]   Head, Face, and Ears   [x]   Shoulders, Back, and Chest  [x]   Arms, Elbows, and Hands   [x]   Coccyx, Sacrum, and Ischium  [x]   Legs, Feet, and Heels    Does the Patient have Skin Breakdown?  No    Glenn Prevention initiated: Yes  Wound Care Orders initiated: NA    [facility] nurse consulted for Pressure Injury (Stage 3,4, Unstageable, DTI, NWPT, and Complex wounds) and New or Established Ostomies: NA        Primary Nurse eSignature: Javy Kramer RN on 7/23/2021 at 1:36 AM      Co-Signer eSignature: Electronically signed by Bryson Live RN on 7/23/21 at 1:44 AM CDT

## 2021-07-23 NOTE — PROGRESS NOTES
Tiffany Langford arrived to room # 924 1102. Presented with: Pneumonia due to COVID  Mental Status: Patient is disoriented and alert. Vitals:    07/23/21 0034   BP:    Pulse: 78   Resp: 22   Temp:    SpO2: (!) 85%     Patient safety contract and falls prevention contract reviewed with patient Yes. Oriented Patient to room. Call light within reach. No. Unable to use, sedated on vent.    Needs, issues or concerns expressed at this time: no.        Electronically signed by Larry Bower RN on 7/23/2021 at 1:33 AM

## 2021-07-23 NOTE — PROGRESS NOTES
Pharmacy Consult      Obi Siddiqui is a 37 y.o. male for whom pharmacy has been consulted to dose Remdesivir and or Tocilizumab. Patient Active Problem List   Diagnosis    Pneumonia due to COVID-19 virus    UTI (urinary tract infection)    Morbid exogenous obesity ([facility] [address]    Asthma    Hypoxemia    Acute respiratory failure due to COVID-19 [facility])    Acute respiratory distress syndrome in adult [facility])    Elevated d-dimer    Acute kidney injury ([facility] [facility]       Allergies:  Patient has no known allergies. Recent Labs     07/22/21  2301   CREATININE 1.7*       Ht/Wt:   Ht Readings from Last 1 Encounters:   07/22/21 5' 8\" (1.727 m)        Wt Readings from Last 1 Encounters:   07/22/21 (!) 301 lb (136.5 kg)         Estimated Creatinine Clearance: 84 mL/min (A) (based on SCr of 1.7 mg/dL (H)). Assessment/Plan:    Patient direct admit from [facility] on 07/22. Patient tested positive for COVID on 07/19 per Marly Black RN. Patient met qualifications for Tocilizumab. Remdesivir would fall into the exclusion criteria. See below inclusion/exclusion criteria.      Remdesivir (RDV) Initiation    Inclusion (must meet both criteria)  1)   Adults, children, or pregnant women with proven COVID-19 requiring hospitalization for COVID-19-severe pneumonia  2)   Requiring supplemental oxygen    Exclusion Criteria (Patients who generally should not be eligible for RDV therapy initiation):  Requiring invasive or non-invasive mechanical ventilation or ECMO (not likely to have any benefit)  Use of more than 1 vasopressor agent prior to start of remdesivir       Already improving on current treatment/supportive regimen as evidenced by improving oxygenation, and/or impending discharge  Patients in whom the clinical team think death is in the immediate short-term whereby administration of RDV unlikely to change clinical outcome    Relative Exclusion Considerations  1)  Patients receiving high flow oxygen may not benefit (NEHARHSAM

## 2021-07-23 NOTE — PROGRESS NOTES
7/23/2021  4:59 AM - Loni Sim Incoming Lab Results From [facility]    Component Value Ref Range & Units Status Collected Lab   pH, Arterial 7.370  7.350 - 7.450 Final 07/23/2021  4:52 AM [facility]   pCO2, Arterial 38.0  35.0 - 45.0 mmHg Final 07/23/2021  4:52 AM [facility]   pO2, Arterial 55. 0Low   80.0 - 100.0 mmHg Final 07/23/2021  4:52 AM [facility]   HCO3, Arterial 22.0  22.0 - 26.0 mmol/L Final 07/23/2021  4:52 AM [facility] Excess, Arterial -2.9Low   -2.0 - 2.0 mmol/L Final 07/23/2021  4:52 AM [facility]   Hemoglobin, Art, Extended 13.9Low   14.0 - 18.0 g/dL Final 07/23/2021  4:52 AM [facility]   O2 Sat, Arterial 88.6Low   >92 % Final 07/23/2021  4:52 AM [facility]   Carboxyhgb, Arterial 2.1  0.0 - 5.0 % Final 07/23/2021  4:52 AM [facility]        0.0-1.5   (Smokers 1.5-5.0)    Methemoglobin, Arterial 1.0  <1.5 % Final 07/23/2021  4:52 AM [facility]   O2 Content, Arterial 17.3  Not Established mL/dL Final 07/23/2021  4:52 AM [facility]   O2 Therapy Unknown   Final 07/23      VC 22, 600, 12 PEEP, 100%  RIGHT RADIAL  ALLENS TEST POSITIVE

## 2021-07-23 NOTE — H&P
HISTORY AND PHYSICAL             Date: 7/23/2021        Patient Name: Gudelia Seo     YOB: 1985      Age:  37 y.o. Chief Complaint   Admitted with covid pneumonia and hypoxemia and respiratory distress and intubated on arrival to hospital     History Obtained From   Chart     History of Present Illness   38 yo male with minimal risk factors [facility]u bed for covid pneumonitis requiring intubation and maximal respiratory support. His only risk factors are that of asthma and exogenous obesity     He did develop sx gradually over the last few days and particularly worsened today. Past Medical History     Past Medical History:   Diagnosis Date    Asthma     Morbid exogenous obesity [facility])         Past Surgical History   History reviewed. No pertinent surgical history. Medications Prior to Admission     Prior to Admission medications    Not on File        Allergies   Patient has no allergy information on record. Social History     Social History    None         Family History   History reviewed. No pertinent family history. Review of Systems   Review of Systems   Unable to perform ROS: Intubated       Physical Exam   Pulse 78   Resp 22   Ht 5' 8\" (1.727 m)   Wt (!) 301 lb (136.5 kg)   SpO2 (!) 85%   BMI 45.77 kg/m²     Physical Exam  Constitutional:       General: He is in acute distress. Appearance: He is obese. He is ill-appearing and diaphoretic. HENT:      Mouth/Throat:      Mouth: Mucous membranes are moist.   Eyes:      Conjunctiva/sclera: Conjunctivae normal.   Cardiovascular:      Rate and Rhythm: Regular rhythm. Tachycardia present. Pulmonary:      Effort: Respiratory distress present. Breath sounds: Wheezing, rhonchi and rales present. Abdominal:      Comments: Bowel sounds hypoactive    Musculoskeletal:      Right lower leg: Edema present. Left lower leg: Edema present. Skin:     General: Skin is warm. Labs      Recent Results (from the past 24 hour(s))   C-Reactive Protein    Collection Time: 07/22/21 11:01 PM   Result Value Ref Range    CRP 9.82 (H) 0.00 - 0.50 mg/dL   CBC Auto Differential    Collection Time: 07/22/21 11:01 PM   Result Value Ref Range    WBC 7.3 4.8 - 10.8 K/uL    RBC 4.77 4.70 - 6.10 M/uL    Hemoglobin 14.4 14.0 - 18.0 g/dL    Hematocrit 41.7 (L) 42.0 - 52.0 %    MCV 87.4 80.0 - 94.0 fL    MCH 30.2 27.0 - 31.0 pg    MCHC 34.5 33.0 - 37.0 g/dL    RDW 13.5 11.5 - 14.5 %    Platelets 187 235 - 827 K/uL    MPV 10.0 9.4 - 12.4 fL    Neutrophils % 84.6 (H) 50.0 - 65.0 %    Lymphocytes % 11.0 (L) 20.0 - 40.0 %    Monocytes % 3.3 0.0 - 10.0 %    Eosinophils % 0.0 0.0 - 5.0 %    Basophils % 0.1 0.0 - 1.0 %    Neutrophils Absolute 6.2 1.5 - 7.5 K/uL    Immature Granulocytes # 0.1 K/uL    Lymphocytes Absolute 0.8 (L) 1.1 - 4.5 K/uL    Monocytes Absolute 0.20 0.00 - 0.90 K/uL    Eosinophils Absolute 0.00 0.00 - 0.60 K/uL    Basophils Absolute 0.00 0.00 - 0.20 K/uL   Comprehensive Metabolic Panel w/ Reflex to MG    Collection Time: 07/22/21 11:01 PM   Result Value Ref Range    Sodium 134 (L) 136 - 145 mmol/L    Potassium reflex Magnesium 4.1 3.5 - 5.0 mmol/L    Chloride 95 (L) 98 - 111 mmol/L    CO2 23 22 - 29 mmol/L    Anion Gap 16 7 - 19 mmol/L    Glucose 166 (H) 74 - 109 mg/dL    BUN 29 (H) 6 - 20 mg/dL    CREATININE 1.7 (H) 0.5 - 1.2 mg/dL    GFR Non- 46 (A) >60    GFR  56 (L) >59    Calcium 8.6 8.6 - 10.0 mg/dL    Total Protein 7.4 6.6 - 8.7 g/dL    Albumin 3.8 3.5 - 5.2 g/dL    Total Bilirubin 0.4 0.2 - 1.2 mg/dL    Alkaline Phosphatase 56 40 - 130 U/L    ALT 79 (H) 5 - 41 U/L     (H) 5 - 40 U/L   D-Dimer, Quantitative    Collection Time: 07/22/21 11:01 PM   Result Value Ref Range    D-Dimer, Quant 13.54 (H) 0.00 - 0.48 ug/mL FEU   Lactic Acid, Plasma    Collection Time: 07/22/21 11:01 PM   Result Value Ref Range    Lactic Acid 1.6 0.5 - 1.9 mmol/L Imaging/Diagnostics Last 24 Hours   No results found. Assessment      Hospital Problems         Last Modified POA    * (Principal) Pneumonia due to COVID-19 virus 7/23/2021 Yes    UTI (urinary tract infection) 7/23/2021 Yes    Morbid exogenous obesity ([facility] [address] 7/23/2021 Yes    Asthma 7/23/2021 Yes    Hypoxemia 7/23/2021 Yes    Acute respiratory failure due to COVID-19 ([facility] [address] 7/23/2021 Yes    Acute respiratory distress syndrome in adult [facility]) 7/23/2021 Yes    Elevated d-dimer 7/23/2021 Yes    Acute kidney injury ([facility] [address] 7/23/2021 Yes          Plan   Patient being admitted due to acute covid pneumonia with respiratory failure and hypoxemia and requiring ventilator support. Transferred from [facility] management   Steroids   Dexamethasone   Pharmacy consultation for toculizumab   And he qualifies  [facility]   Additional being covered for bacterial super infection     Morbid exogenous obesity     Asthma     Acute kidney injury     Fluid bolus     dvt full dose due to d dimer elevation and patient not stable enough to go to scans for now     Moderate sirs criteria met  No kael evidence of sepsis       Being admitted also due to acute onset of respiratory failure with hypoxemia and hypercapnea and may have sleep apnea and / or yenny at baseline and once acute situation stabilized   Then we can address this outpatient.    Consultations Ordered:  IP CONSULT TO DIETITIAN  IP CONSULT TO PULMONOLOGY  IP CONSULT TO PHARMACY    Electronically signed by Alejandro Roque MD on 7/23/21 at 12:30 AM CDT

## 2021-07-24 ENCOUNTER — APPOINTMENT (OUTPATIENT)
Dept: GENERAL RADIOLOGY | Age: 36
DRG: 871 | End: 2021-07-24
Attending: INTERNAL MEDICINE
Payer: COMMERCIAL

## 2021-07-24 VITALS
DIASTOLIC BLOOD PRESSURE: 83 MMHG | WEIGHT: 301 LBS | RESPIRATION RATE: 24 BRPM | BODY MASS INDEX: 45.62 KG/M2 | HEART RATE: 74 BPM | TEMPERATURE: 96.5 F | HEIGHT: 68 IN | OXYGEN SATURATION: 97 % | SYSTOLIC BLOOD PRESSURE: 142 MMHG

## 2021-07-24 LAB
ALBUMIN SERPL-MCNC: 3.2 G/DL (ref 3.5–5.2)
ALP BLD-CCNC: 56 U/L (ref 40–130)
ALT SERPL-CCNC: 75 U/L (ref 5–41)
ANION GAP SERPL CALCULATED.3IONS-SCNC: 17 MMOL/L (ref 7–19)
AST SERPL-CCNC: 164 U/L (ref 5–40)
BASE EXCESS ARTERIAL: -2.3 MMOL/L (ref -2–2)
BASE EXCESS ARTERIAL: -3.2 MMOL/L (ref -2–2)
BASE EXCESS ARTERIAL: -3.3 MMOL/L (ref -2–2)
BASE EXCESS ARTERIAL: -3.9 MMOL/L (ref -2–2)
BASOPHILS ABSOLUTE: 0 K/UL (ref 0–0.2)
BASOPHILS RELATIVE PERCENT: 0.3 % (ref 0–1)
BILIRUB SERPL-MCNC: 0.5 MG/DL (ref 0.2–1.2)
BUN BLDV-MCNC: 39 MG/DL (ref 6–20)
CALCIUM SERPL-MCNC: 8.6 MG/DL (ref 8.6–10)
CARBOXYHEMOGLOBIN ARTERIAL: 2.1 % (ref 0–5)
CARBOXYHEMOGLOBIN ARTERIAL: 2.4 % (ref 0–5)
CARBOXYHEMOGLOBIN ARTERIAL: 2.5 % (ref 0–5)
CARBOXYHEMOGLOBIN ARTERIAL: 2.5 % (ref 0–5)
CHLORIDE BLD-SCNC: 96 MMOL/L (ref 98–111)
CO2: 21 MMOL/L (ref 22–29)
CREAT SERPL-MCNC: 1.6 MG/DL (ref 0.5–1.2)
EOSINOPHILS ABSOLUTE: 0 K/UL (ref 0–0.6)
EOSINOPHILS RELATIVE PERCENT: 0 % (ref 0–5)
GFR AFRICAN AMERICAN: >59
GFR NON-AFRICAN AMERICAN: 50
GLUCOSE BLD-MCNC: 167 MG/DL (ref 70–99)
GLUCOSE BLD-MCNC: 174 MG/DL (ref 74–109)
GLUCOSE BLD-MCNC: 190 MG/DL (ref 70–99)
GLUCOSE BLD-MCNC: 234 MG/DL (ref 70–99)
HCO3 ARTERIAL: 22.7 MMOL/L (ref 22–26)
HCO3 ARTERIAL: 23.2 MMOL/L (ref 22–26)
HCO3 ARTERIAL: 24.7 MMOL/L (ref 22–26)
HCO3 ARTERIAL: 25 MMOL/L (ref 22–26)
HCT VFR BLD CALC: 43.9 % (ref 42–52)
HEMOGLOBIN, ART, EXTENDED: 12.9 G/DL (ref 14–18)
HEMOGLOBIN, ART, EXTENDED: 14.2 G/DL (ref 14–18)
HEMOGLOBIN, ART, EXTENDED: 14.4 G/DL (ref 14–18)
HEMOGLOBIN, ART, EXTENDED: 14.9 G/DL (ref 14–18)
HEMOGLOBIN: 15.3 G/DL (ref 14–18)
IMMATURE GRANULOCYTES #: 0.1 K/UL
LYMPHOCYTES ABSOLUTE: 1.1 K/UL (ref 1.1–4.5)
LYMPHOCYTES RELATIVE PERCENT: 14.6 % (ref 20–40)
MCH RBC QN AUTO: 31.3 PG (ref 27–31)
MCHC RBC AUTO-ENTMCNC: 34.9 G/DL (ref 33–37)
MCV RBC AUTO: 89.8 FL (ref 80–94)
METHEMOGLOBIN ARTERIAL: 1.7 %
METHEMOGLOBIN ARTERIAL: 1.8 %
METHEMOGLOBIN ARTERIAL: 2 %
METHEMOGLOBIN ARTERIAL: 2 %
MONOCYTES ABSOLUTE: 0.4 K/UL (ref 0–0.9)
MONOCYTES RELATIVE PERCENT: 4.9 % (ref 0–10)
NEUTROPHILS ABSOLUTE: 6 K/UL (ref 1.5–7.5)
NEUTROPHILS RELATIVE PERCENT: 78.4 % (ref 50–65)
O2 CONTENT ARTERIAL: 16.9 ML/DL
O2 CONTENT ARTERIAL: 18.2 ML/DL
O2 CONTENT ARTERIAL: 18.9 ML/DL
O2 CONTENT ARTERIAL: 20 ML/DL
O2 SAT, ARTERIAL: 91.1 %
O2 SAT, ARTERIAL: 92.8 %
O2 SAT, ARTERIAL: 93 %
O2 SAT, ARTERIAL: 95.1 %
O2 THERAPY: ABNORMAL
PCO2 ARTERIAL: 43 MMHG (ref 35–45)
PCO2 ARTERIAL: 46 MMHG (ref 35–45)
PCO2 ARTERIAL: 52 MMHG (ref 35–45)
PCO2 ARTERIAL: 59 MMHG (ref 35–45)
PDW BLD-RTO: 14 % (ref 11.5–14.5)
PERFORMED ON: ABNORMAL
PH ARTERIAL: 7.23 (ref 7.35–7.45)
PH ARTERIAL: 7.29 (ref 7.35–7.45)
PH ARTERIAL: 7.31 (ref 7.35–7.45)
PH ARTERIAL: 7.33 (ref 7.35–7.45)
PLATELET # BLD: 222 K/UL (ref 130–400)
PMV BLD AUTO: 10.8 FL (ref 9.4–12.4)
PO2 ARTERIAL: 70 MMHG (ref 80–100)
PO2 ARTERIAL: 71 MMHG (ref 80–100)
PO2 ARTERIAL: 78 MMHG (ref 80–100)
PO2 ARTERIAL: 88 MMHG (ref 80–100)
POTASSIUM REFLEX MAGNESIUM: 4.4 MMOL/L (ref 3.5–5)
POTASSIUM, WHOLE BLOOD: 4.2
POTASSIUM, WHOLE BLOOD: 4.3
POTASSIUM, WHOLE BLOOD: 4.3
POTASSIUM, WHOLE BLOOD: 4.4
RBC # BLD: 4.89 M/UL (ref 4.7–6.1)
SODIUM BLD-SCNC: 134 MMOL/L (ref 136–145)
TOTAL PROTEIN: 7.4 G/DL (ref 6.6–8.7)
WBC # BLD: 7.7 K/UL (ref 4.8–10.8)

## 2021-07-24 PROCEDURE — 94003 VENT MGMT INPAT SUBQ DAY: CPT

## 2021-07-24 PROCEDURE — 6370000000 HC RX 637 (ALT 250 FOR IP): Performed by: STUDENT IN AN ORGANIZED HEALTH CARE EDUCATION/TRAINING PROGRAM

## 2021-07-24 PROCEDURE — 31720 CLEARANCE OF AIRWAYS: CPT

## 2021-07-24 PROCEDURE — 84132 ASSAY OF SERUM POTASSIUM: CPT

## 2021-07-24 PROCEDURE — 36600 WITHDRAWAL OF ARTERIAL BLOOD: CPT

## 2021-07-24 PROCEDURE — 82803 BLOOD GASES ANY COMBINATION: CPT

## 2021-07-24 PROCEDURE — 80053 COMPREHEN METABOLIC PANEL: CPT

## 2021-07-24 PROCEDURE — 6360000002 HC RX W HCPCS: Performed by: STUDENT IN AN ORGANIZED HEALTH CARE EDUCATION/TRAINING PROGRAM

## 2021-07-24 PROCEDURE — 2580000003 HC RX 258: Performed by: INTERNAL MEDICINE

## 2021-07-24 PROCEDURE — 6360000002 HC RX W HCPCS: Performed by: INTERNAL MEDICINE

## 2021-07-24 PROCEDURE — 2100000000 HC CCU R&B

## 2021-07-24 PROCEDURE — 82947 ASSAY GLUCOSE BLOOD QUANT: CPT

## 2021-07-24 PROCEDURE — 85025 COMPLETE CBC W/AUTO DIFF WBC: CPT

## 2021-07-24 PROCEDURE — 2580000003 HC RX 258: Performed by: STUDENT IN AN ORGANIZED HEALTH CARE EDUCATION/TRAINING PROGRAM

## 2021-07-24 PROCEDURE — 2500000003 HC RX 250 WO HCPCS: Performed by: STUDENT IN AN ORGANIZED HEALTH CARE EDUCATION/TRAINING PROGRAM

## 2021-07-24 PROCEDURE — 94640 AIRWAY INHALATION TREATMENT: CPT

## 2021-07-24 PROCEDURE — C9113 INJ PANTOPRAZOLE SODIUM, VIA: HCPCS | Performed by: STUDENT IN AN ORGANIZED HEALTH CARE EDUCATION/TRAINING PROGRAM

## 2021-07-24 PROCEDURE — 2700000000 HC OXYGEN THERAPY PER DAY

## 2021-07-24 PROCEDURE — 71045 X-RAY EXAM CHEST 1 VIEW: CPT

## 2021-07-24 PROCEDURE — 2580000003 HC RX 258

## 2021-07-24 PROCEDURE — 2500000003 HC RX 250 WO HCPCS

## 2021-07-24 RX ORDER — HEPARIN SODIUM 1000 [USP'U]/ML
5000 INJECTION, SOLUTION INTRAVENOUS; SUBCUTANEOUS ONCE
Status: COMPLETED | OUTPATIENT
Start: 2021-07-24 | End: 2021-07-24

## 2021-07-24 RX ORDER — BUDESONIDE 0.5 MG/2ML
0.5 INHALANT ORAL 2 TIMES DAILY
Status: DISCONTINUED | OUTPATIENT
Start: 2021-07-24 | End: 2021-07-25 | Stop reason: HOSPADM

## 2021-07-24 RX ORDER — BUMETANIDE 0.25 MG/ML
1 INJECTION, SOLUTION INTRAMUSCULAR; INTRAVENOUS ONCE
Status: COMPLETED | OUTPATIENT
Start: 2021-07-24 | End: 2021-07-24

## 2021-07-24 RX ORDER — CALCIUM CHLORIDE 100 MG/ML
INJECTION INTRAVENOUS; INTRAVENTRICULAR
Status: COMPLETED
Start: 2021-07-24 | End: 2021-07-24

## 2021-07-24 RX ORDER — CALCIUM CHLORIDE 100 MG/ML
1000 INJECTION INTRAVENOUS; INTRAVENTRICULAR ONCE
Status: COMPLETED | OUTPATIENT
Start: 2021-07-24 | End: 2021-07-24

## 2021-07-24 RX ORDER — EPINEPHRINE 0.1 MG/ML
1 SYRINGE (ML) INJECTION ONCE
Status: COMPLETED | OUTPATIENT
Start: 2021-07-24 | End: 2021-07-24

## 2021-07-24 RX ORDER — MIDAZOLAM HYDROCHLORIDE 1 MG/ML
1 INJECTION INTRAMUSCULAR; INTRAVENOUS PRN
Status: DISCONTINUED | OUTPATIENT
Start: 2021-07-24 | End: 2021-07-25 | Stop reason: HOSPADM

## 2021-07-24 RX ORDER — VECURONIUM BROMIDE 1 MG/ML
10 INJECTION, POWDER, LYOPHILIZED, FOR SOLUTION INTRAVENOUS ONCE
Status: COMPLETED | OUTPATIENT
Start: 2021-07-24 | End: 2021-07-24

## 2021-07-24 RX ORDER — NOREPINEPHRINE BIT/0.9 % NACL 16MG/250ML
2-100 INFUSION BOTTLE (ML) INTRAVENOUS CONTINUOUS
Status: DISCONTINUED | OUTPATIENT
Start: 2021-07-24 | End: 2021-07-25 | Stop reason: HOSPADM

## 2021-07-24 RX ADMIN — SODIUM CHLORIDE, PRESERVATIVE FREE 10 ML: 5 INJECTION INTRAVENOUS at 09:42

## 2021-07-24 RX ADMIN — PROPOFOL 60 MCG/KG/MIN: 10 INJECTION, EMULSION INTRAVENOUS at 07:52

## 2021-07-24 RX ADMIN — IPRATROPIUM BROMIDE AND ALBUTEROL SULFATE 1 AMPULE: .5; 3 SOLUTION RESPIRATORY (INHALATION) at 20:37

## 2021-07-24 RX ADMIN — PIPERACILLIN AND TAZOBACTAM 3375 MG: 3; .375 INJECTION, POWDER, LYOPHILIZED, FOR SOLUTION INTRAVENOUS at 17:45

## 2021-07-24 RX ADMIN — IPRATROPIUM BROMIDE AND ALBUTEROL SULFATE 1 AMPULE: .5; 3 SOLUTION RESPIRATORY (INHALATION) at 14:02

## 2021-07-24 RX ADMIN — PIPERACILLIN AND TAZOBACTAM 3375 MG: 3; .375 INJECTION, POWDER, LYOPHILIZED, FOR SOLUTION INTRAVENOUS at 02:45

## 2021-07-24 RX ADMIN — PROPOFOL 60 MCG/KG/MIN: 10 INJECTION, EMULSION INTRAVENOUS at 15:41

## 2021-07-24 RX ADMIN — PROPOFOL 60 MCG/KG/MIN: 10 INJECTION, EMULSION INTRAVENOUS at 12:37

## 2021-07-24 RX ADMIN — BUMETANIDE 1 MG: 0.25 INJECTION, SOLUTION INTRAMUSCULAR; INTRAVENOUS at 09:22

## 2021-07-24 RX ADMIN — DEXAMETHASONE SODIUM PHOSPHATE 6 MG: 10 INJECTION, SOLUTION INTRAMUSCULAR; INTRAVENOUS at 01:43

## 2021-07-24 RX ADMIN — PROPOFOL 60 MCG/KG/MIN: 10 INJECTION, EMULSION INTRAVENOUS at 22:49

## 2021-07-24 RX ADMIN — PROPOFOL 60 MCG/KG/MIN: 10 INJECTION, EMULSION INTRAVENOUS at 10:59

## 2021-07-24 RX ADMIN — PANTOPRAZOLE SODIUM 40 MG: 40 INJECTION, POWDER, FOR SOLUTION INTRAVENOUS at 09:22

## 2021-07-24 RX ADMIN — Medication 250 MCG/HR: at 20:37

## 2021-07-24 RX ADMIN — SODIUM CHLORIDE 0.2 MG/KG/HR: 9 INJECTION, SOLUTION INTRAVENOUS at 21:34

## 2021-07-24 RX ADMIN — HEPARIN SODIUM 5000 UNITS: 1000 INJECTION INTRAVENOUS; SUBCUTANEOUS at 20:38

## 2021-07-24 RX ADMIN — CALCIUM CHLORIDE 1000 MG: 100 INJECTION, SOLUTION INTRAVENOUS at 23:29

## 2021-07-24 RX ADMIN — CALCIUM CHLORIDE 1000 MG: 100 INJECTION INTRAVENOUS; INTRAVENTRICULAR at 23:29

## 2021-07-24 RX ADMIN — VECURONIUM BROMIDE 10 MG: 1 INJECTION, POWDER, LYOPHILIZED, FOR SOLUTION INTRAVENOUS at 15:48

## 2021-07-24 RX ADMIN — PROPOFOL 60 MCG/KG/MIN: 10 INJECTION, EMULSION INTRAVENOUS at 20:37

## 2021-07-24 RX ADMIN — IPRATROPIUM BROMIDE AND ALBUTEROL SULFATE 1 AMPULE: .5; 3 SOLUTION RESPIRATORY (INHALATION) at 17:45

## 2021-07-24 RX ADMIN — ENOXAPARIN SODIUM 135 MG: 150 INJECTION SUBCUTANEOUS at 01:44

## 2021-07-24 RX ADMIN — DEXAMETHASONE SODIUM PHOSPHATE 6 MG: 10 INJECTION, SOLUTION INTRAMUSCULAR; INTRAVENOUS at 14:03

## 2021-07-24 RX ADMIN — PROPOFOL 60 MCG/KG/MIN: 10 INJECTION, EMULSION INTRAVENOUS at 14:16

## 2021-07-24 RX ADMIN — PIPERACILLIN AND TAZOBACTAM 3375 MG: 3; .375 INJECTION, POWDER, LYOPHILIZED, FOR SOLUTION INTRAVENOUS at 10:21

## 2021-07-24 RX ADMIN — SODIUM CHLORIDE 0.2 MG/KG/HR: 9 INJECTION, SOLUTION INTRAVENOUS at 14:03

## 2021-07-24 RX ADMIN — ENOXAPARIN SODIUM 135 MG: 150 INJECTION SUBCUTANEOUS at 14:03

## 2021-07-24 RX ADMIN — PROPOFOL 60 MCG/KG/MIN: 10 INJECTION, EMULSION INTRAVENOUS at 19:04

## 2021-07-24 RX ADMIN — IPRATROPIUM BROMIDE AND ALBUTEROL SULFATE 1 AMPULE: .5; 3 SOLUTION RESPIRATORY (INHALATION) at 09:22

## 2021-07-24 RX ADMIN — PROPOFOL 50 MCG/KG/MIN: 10 INJECTION, EMULSION INTRAVENOUS at 04:45

## 2021-07-24 RX ADMIN — SODIUM CHLORIDE, PRESERVATIVE FREE 10 ML: 5 INJECTION INTRAVENOUS at 09:23

## 2021-07-24 RX ADMIN — BUDESONIDE 500 MCG: 0.5 SUSPENSION RESPIRATORY (INHALATION) at 20:37

## 2021-07-24 RX ADMIN — Medication 2 MCG/MIN: at 21:33

## 2021-07-24 RX ADMIN — WATER 10 ML: 1 INJECTION INTRAMUSCULAR; INTRAVENOUS; SUBCUTANEOUS at 15:49

## 2021-07-24 RX ADMIN — PROPOFOL 60 MCG/KG/MIN: 10 INJECTION, EMULSION INTRAVENOUS at 20:47

## 2021-07-24 RX ADMIN — CALCIUM CHLORIDE INJECTION 1000 MG: 100 INJECTION, SOLUTION INTRAVENOUS at 20:39

## 2021-07-24 RX ADMIN — PROPOFOL 60 MCG/KG/MIN: 10 INJECTION, EMULSION INTRAVENOUS at 21:33

## 2021-07-24 RX ADMIN — MIDAZOLAM 1 MG: 1 INJECTION INTRAMUSCULAR; INTRAVENOUS at 19:17

## 2021-07-24 RX ADMIN — Medication 125 MCG/HR: at 00:16

## 2021-07-24 RX ADMIN — BUDESONIDE 500 MCG: 0.5 SUSPENSION RESPIRATORY (INHALATION) at 09:41

## 2021-07-24 RX ADMIN — SODIUM CHLORIDE, PRESERVATIVE FREE 10 ML: 5 INJECTION INTRAVENOUS at 20:39

## 2021-07-24 RX ADMIN — Medication 12 MCG/MIN: at 23:57

## 2021-07-24 RX ADMIN — EPINEPHRINE 1 MG: 0.1 INJECTION, SOLUTION ENDOTRACHEAL; INTRACARDIAC; INTRAVENOUS at 20:38

## 2021-07-24 ASSESSMENT — PULMONARY FUNCTION TESTS
PIF_VALUE: 35
PIF_VALUE: 21
PIF_VALUE: 32
PIF_VALUE: 32
PIF_VALUE: 31
PIF_VALUE: 34
PIF_VALUE: 31
PIF_VALUE: 28
PIF_VALUE: 21
PIF_VALUE: 35
PIF_VALUE: 30
PIF_VALUE: 36
PIF_VALUE: 35
PIF_VALUE: 34
PIF_VALUE: 34
PIF_VALUE: 30
PIF_VALUE: 20
PIF_VALUE: 32
PIF_VALUE: 29
PIF_VALUE: 30
PIF_VALUE: 26
PIF_VALUE: 34
PIF_VALUE: 38
PIF_VALUE: 30
PIF_VALUE: 33
PIF_VALUE: 29
PIF_VALUE: 28
PIF_VALUE: 36

## 2021-07-24 ASSESSMENT — PAIN SCALES - GENERAL
PAINLEVEL_OUTOF10: 0
PAINLEVEL_OUTOF10: 0

## 2021-07-24 NOTE — PROGRESS NOTES
Results for Estevan Maddox (MRN [id number]) as of 7/24/2021 16:24   Ref.  Range 7/24/2021 16:21   Hemoglobin, Art, Extended Latest Ref Range: 14.0 - 18.0 g/dL 14.2   pH, Arterial Latest Ref Range: 7.350 - 7.450  7.230 (LL)   pCO2, Arterial Latest Ref Range: 35.0 - 45.0 mmHg 59.0 (H)   pO2, Arterial Latest Ref Range: 80.0 - 100.0 mmHg 70.0 (L)   HCO3, Arterial Latest Ref Range: 22.0 - 26.0 mmol/L 24.7   Base Excess, Arterial Latest Ref Range: -2.0 - 2.0 mmol/L -3.9 (L)   O2 Sat, Arterial Latest Ref Range: >92 % 91.1   O2 Content, Arterial Latest Ref Range: Not Established mL/dL 18.2   Methemoglobin, Arterial Latest Ref Range: <1.5 % 2.0   Carboxyhgb, Arterial Latest Ref Range: 0.0 - 5.0 % 2.5   VC, 26 450 100% +12  ABG at LR

## 2021-07-24 NOTE — ADT AUTH CERT
[address]  [address]  [address] [place] [address]   [id number]  [id number]  Auth number: N/A     [id number] - ([address])    Return Contact Information:   Name: Kareem Menard  Phone: [phone]  Fax:  [phone]    [facility] RECEIPT    Chana Grier MD                NPI: [id number]  [address] [place] [address], [place] [address]    (F) [phone]  (M) [phone]    DIAGNOSIS CODE:  U07.1, J12.82

## 2021-07-24 NOTE — CONSULTS
**Physician Signature**  This document was electronically signed by: Yue Whitley MD  2021   10:42 AM    **Consult Information**  Member Facility: [address] [place] [address] MRN: [id number]  Visit/Encounter Number: [id number]  Consult ID: [id number]  Facility Time Zone: CT  Date and Time of Request: 2021 05:54 AM  CT  Requesting Clinician: DR. Josee Negro  Patient Name: Farhana Martinez  YOB: 1985  Gender: Male  Patient identity was confirmed at the beginning of the consult with the   patient/family/staff using two personal identifiers: Patient name and       **Reason for Consult**  Reason for Consult: [facility]    **Admission**  Admission Date: 2021  Chief reason for ICU admission: COVID - 19    **Core Metrics**  General orienting sentence for patient: 38 yo xfer from [facility] + on . Sats 40s. PaO2 now 55. ON antibiotics zithromax BAM.   remdesivir. Fent versed and lovenox 60s.   I suggested add ketamine and   change to dilaudid  Chief physiologic deterioration: Increase in FiO2 required by   30%  Is the patient on DVT prophylaxis?: Yes  Prophylaxis type: Pharmacological  DVT Prophylaxis Comments: Lovenox  Is the patient on GI prophylaxis?: Yes  Has this patient reached their nutritional goal?: No and issues are being   addressed  Are there current issues with pain management in this patient?:   No  Are there issues with skin integrity?: No  Are there issues with delirium?: No  Has the patient been mobilized?: No  Is this patient currently intubated?: Yes  Has facility initiated vent bundle?: Yes  Are there ethical or care philosophy or family issues?: No  Do you recommend an in depth evaluation?: No  Disposition Recommendations: Continue ICU level of care    **Inserted/Removed Devices**  Device Name: Endotracheal Tube  Site of insertion: Trachea  Date of insertion: 2021  Device Name: Coreas Catheter  Site of insertion: Urethra  Date of insertion: 07-    **Physician Signature**  This document was electronically signed by: Basia Zayas MD  07/24/2021   10:42 AM

## 2021-07-24 NOTE — DISCHARGE SUMMARY
Discharge Summary    NAME: Elaina Villegas  :  1985  MRN:  [id number]    Admit date:  2021  Discharge date:  2021    Advance Directive: Full Code    Primary Care Physician:  No primary care provider on file. Discharge Diagnoses:      Severe ARDS    Pneumonia due to COVID-19 virus    Acute hypoxemic respiratory failure due to COVID-19 [facility])    Elevated d-dimer    Acute kidney injury ([facility] [address]     Morbid exogenous obesity (HCC)    Asthma      Significant Diagnostic Studies:   XR CHEST PORTABLE    Result Date: 2021  XR CHEST PORTABLE 2021 11:25 PM HISTORY: Mechanical ventilation, lines and tubes Comparison: None Findings: Endotracheal tube is in good position. Tip projects 3.8 cm above the kathryn. Enteric tube courses below the diaphragm with tip beyond the field-of-view. Bilateral, fairly diffuse patchy pulmonary infiltrates. Developing consolidation in the left base. No pleural effusion or pneumothorax. Heart size is normal. There may be a mild central pulmonary congestion. Impression: 1. Endotracheal tube and enteric tube are in good position. 2. Bilateral, fairly diffuse bilateral pulmonary infiltrates in the setting of Covid 19 pneumonia. Signed by Dr Beena Loyola      Pertinent Labs:   CBC:   Recent Labs     21  2301 21  0446 21  0359   WBC 7.3 6.5 7.7   HGB 14.4 13.6* 15.3    167 222     BMP:    Recent Labs       0000 21  2301 21  0446 21  0452 21  0359 21  0420 21  1414 21  1621   NA  --  134* 130*  --  134*  --   --   --    K   < > 4.1 3.9   < > 4.4 4.3 4.3 4.4   CL  --  95* 95*  --  96*  --   --   --    CO2  --   21*  --  21*  --   --   --    BUN  --  29* 35*  --  39*  --   --   --    CREATININE  --  1.7* 1.7*  --  1.6*  --   --   --    GLUCOSE  --  166* 223*  --  174*  --   --   --     < > = values in this interval not displayed. INR: No results for input(s): INR in the last 72 hours.   Lipids: No results for input(s): CHOL, HDL in the last 72 hours. Invalid input(s): LDLCALCU  ABGs:  Recent Labs     07/23/21  0452 07/24/21  0420 07/24/21  1414 07/24/21  1621   PHART 7.370 7.330* 7.290* 7.230*   ZLD8VSP 38.0 43.0 52.0* 59.0*   PO2ART 55.0* 88.0 78.0* 70.0*   RXG8XWJ 22.0 22.7 25.0 24.7   BEART -2.9* -3.3* -2.3* -3.9*   HGBAE 13.9* 14.9 14.4 14.2   E6AZOFQN 88.6* 95.1 93.0 91.1   CARBOXHGBART 2.1 2.5 2.4 2.5   02THERAPY Unknown Unknown Unknown Unknown     HgBA1c:    Recent Labs     07/23/21  0446   LABA1C 5.6               Hospital Course:  38 yo M w/ asthma and morbid obesity admitted with acute hypoxemic respiratory failure due to covid pneumonia, transferred from [facility] in [place], required intubation due to respiratory distress at OSH on 7/22.  CXR with diffuse bl opacities with refractory hypoxia in setting of FiO2 100% and PEEP up to 12 with P/F ratio less than 100 consistent with severe ARDS. Suspected secondary bacterial infection and sepsis with elevated procal and inflammatory markers. Treated with Zosyn, dexamethasone, and Tocilizumab.  Markedly elevated D-dimer in setting of severe Covid infection, CTA chest precluded due to renal function. Suspected DARIN possibly from ATN, renal septic injury, however unknown baseline. Patient empirically therapeutically anticoagulated. Only minimal improvement in respiratory status with prone positioning, however continued to require 100% FiO2. Patient also required significant amounts of sedation with high levels of propofol, fentanyl and Versed. Case discussed with [facility] ECMO service regarding patient's critical nature with severe ARDS. Patient accepted for transfer to [facility] based on his candidacy for ECMO.           Physical Exam:  Vital Signs: BP (!) 121/57   Pulse 80   Temp 98.9 °F (37.2 °C) (Temporal)   Resp 26   Ht 5' 8\" (1.727 m)   Wt (!) 301 lb (136.5 kg)   SpO2 93%   BMI 45.77 kg/m²   General: Intubated and sedated  HEENT: ET tube in place, normocephalic/atraumatic  Cardiovascular: Normal rate, regular rhythm on monitor  Respiratory: Increased work of breathing noted when not adequately sedated  Abdomen: Obese abdomen, no observed abnormalities  Neurologic: Intubated and sedated, unable to fully assess  Extremities: No clubbing or cyanosis  Skin: Warm and dry      Diet: Diet NPO  ADULT TUBE FEEDING; Orogastric; Peptide Based High Protein; Continuous; 5; Yes; 0; Other (specify); 0; 5; 20; Q 1 hour; Protein; 5 Proteinex as tf flush in 24 hours     Disposition: Patient will be transferred to [facility] in [place]. Time spent on discharge and transfer 60 minutes.     Signed:  Radha Jansen MD  7/24/2021 5:25 PM

## 2021-07-24 NOTE — PROGRESS NOTES
Received call from wife, Rosas Jimenes. She has requested to speak to Dr. Kate Salgado. Message sent to him with request, her name, and number.

## 2021-07-24 NOTE — PROGRESS NOTES
7/24/2021  4:21 AM - Loni Sim Incoming Lab Results From [facility]    Component Value Ref Range & Units Status Collected Lab   pH, Arterial 7.330Low   7.350 - 7.450 Final 07/24/2021  4:20 AM [facility]   pCO2, Arterial 43.0  35.0 - 45.0 mmHg Final 07/24/2021  4:20 AM [facility] Lab   pO2, Arterial 88.0  80.0 - 100.0 mmHg Final 07/24/2021  4:20 AM [facility]   HCO3, Arterial 22.7  22.0 - 26.0 mmol/L Final 07/24/2021  4:20 AM [facility] Excess, Arterial -3.3Low   -2.0 - 2.0 mmol/L Final 07/24/2021  4:20 AM [facility]   Hemoglobin, Art, Extended 14.9  14.0 - 18.0 g/dL Final 07/24/2021  4:20 AM [facility]   O2 Sat, Arterial 95.1  >92 % Final 07/24/2021  4:20 AM [facility]   Carboxyhgb, Arterial 2.5  0.0 - 5.0 % Final 07/24/2021  4:20 AM [facility]        0.0-1.5   (Smokers 1.5-5.0)    Methemoglobin, Arterial 1.7  <1.5 % Final 07/24/2021  4:20 AM [facility]   O2 Content, Arterial 20.0  Not Established mL/dL Final 07/24/2021  4:20 AM [facility]     PT ON VENT VC,26,450,100% + 12 PEEP  LEFT RADIAL PUNCTURE AT+

## 2021-07-24 NOTE — PROGRESS NOTES
23 96 % --   07/23/21 2251 -- -- -- 74 24 96 % --   07/23/21 2200 137/77 -- -- 73 23 96 % --   07/23/21 2100 137/80 -- -- 73 26 97 % --   07/23/21 2000 (!) 143/84 97.1 °F (36.2 °C) Axillary 74 27 90 % --   07/23/21 1958 -- -- -- -- 26 91 % --   07/23/21 1900 122/77 -- -- 62 24 96 % --   07/23/21 1800 112/66 -- -- 61 24 95 % --   07/23/21 1753 -- -- -- -- 26 94 % --   07/23/21 1752 -- -- -- 62 26 94 % --   07/23/21 1700 116/71 -- -- 67 24 91 % --   07/23/21 1600 (!) 98/43 97 °F (36.1 °C) Axillary 58 26 (!) 86 % --   07/23/21 1500 (!) 106/51 -- -- 54 26 (!) 83 % --   07/23/21 1441 -- -- -- 54 26 (!) 84 % --   07/23/21 1400 (!) 104/52 -- -- 55 26 (!) 83 % --   07/23/21 1300 (!) 103/49 -- -- 56 26 (!) 81 % --   07/23/21 1258 -- -- -- -- -- -- 5' 8\" (1.727 m)   07/23/21 1200 (!) 101/48 97 °F (36.1 °C) Axillary 56 26 (!) 82 % --   07/23/21 1100 (!) 108/50 -- -- 60 26 (!) 81 % --   07/23/21 1000 (!) 104/46 -- -- 53 22 (!) 87 % --   07/23/21 0900 (!) 102/47 -- -- 54 22 (!) 87 % --   07/23/21 0800 (!) 102/48 97.3 °F (36.3 °C) Temporal 55 22 (!) 88 % --   07/23/21 0752 -- -- -- 55 22 (!) 87 % --   07/23/21 0751 (!) 98/46 -- -- 56 -- -- --       Physical exam    General: Intubated and sedated  HEENT: ET tube in place, normocephalic/atraumatic  Cardiovascular: Normal rate, regular rhythm on monitor  Respiratory: Increased work of breathing noted when not adequately sedated  Abdomen: Obese abdomen, no observed abnormalities  Neurologic: Intubated and sedated, unable to fully assess  Extremities: No clubbing or cyanosis  Skin: Warm and dry          Lab Review   Recent Results (from the past 24 hour(s))   POCT Glucose    Collection Time: 07/23/21 12:15 PM   Result Value Ref Range    POC Glucose 194 (H) 70 - 99 mg/dl    Performed on [employer]    Strep Pneumoniae Antigen    Collection Time: 07/23/21  2:50 PM    Specimen: Urine, clean catch   Result Value Ref Range    STREP PNEUMONIAE ANTIGEN, URINE       Presumptive Negative  Presumptive negative suggests no current or recent  pneumococcal infection. Infection due to Strep pneumoniae  cannot be ruled out since the antigen present in the sample  may be below the detection limit of the test.  Normal Range:Presumptive Negative     Legionella antigen, urine    Collection Time: 07/23/21  2:50 PM    Specimen: Urine, clean catch   Result Value Ref Range    L. pneumophila Serogp 1 Ur Ag       Presumptive Negative  No Legionella pneumophila serogroup 1 antigens detected. A negative result does not exclude infection with  Legionella pneumophila serogroup 1 nor does it rule out  other microbial-caused respiratory infections or  disease caused by other serogroups of  Legionella pneumophila.   Normal Range: Presumptive Negative     Culture, Respiratory    Collection Time: 07/23/21  4:00 PM    Specimen: Sputum, Suctioned   Result Value Ref Range    CULTURE, RESPIRATORY Normal respiratory brandie     Gram Stain Result       Moderate WBC's (Polymorphonuclear) present  Moderate WBC's (Mononuclear) present  Few Epithelial Cells present  Rare Mixed bacterial morphotypes suggestive of  Normal respiratory brandie present     POCT Glucose    Collection Time: 07/23/21  6:06 PM   Result Value Ref Range    POC Glucose 170 (H) 70 - 99 mg/dl    Performed on AccuChek    POCT Glucose    Collection Time: 07/23/21  8:52 PM   Result Value Ref Range    POC Glucose 171 (H) 70 - 99 mg/dl    Performed on AccuChek    POCT Glucose    Collection Time: 07/24/21  1:49 AM   Result Value Ref Range    POC Glucose 167 (H) 70 - 99 mg/dl    Performed on AccuChek    CBC Auto Differential    Collection Time: 07/24/21  3:59 AM   Result Value Ref Range    WBC 7.7 4.8 - 10.8 K/uL    RBC 4.89 4.70 - 6.10 M/uL    Hemoglobin 15.3 14.0 - 18.0 g/dL    Hematocrit 43.9 42.0 - 52.0 %    MCV 89.8 80.0 - 94.0 fL    MCH 31.3 (H) 27.0 - 31.0 pg    MCHC 34.9 33.0 - 37.0 g/dL    RDW 14.0 11.5 - 14.5 %    Platelets 735 637 - 165 K/uL    MPV 10.8 recorded.       Current Facility-Administered Medications:     budesonide (PULMICORT) nebulizer suspension 500 mcg, 0.5 mg, Nebulization, BID, Ebony Spivey MD    bumetanide [facility]) injection 1 mg, 1 mg, Intravenous, Once, Ebony Spivey MD    sodium chloride flush 0.9 % injection 5-40 mL, 5-40 mL, Intravenous, 2 times per day, Fan Parsons MD, 10 mL at 07/23/21 2101    sodium chloride flush 0.9 % injection 5-40 mL, 5-40 mL, Intravenous, PRN, Fan Parsons MD    0.9 % sodium chloride infusion, 25 mL, Intravenous, PRN, Fan Parsons MD    polyethylene glycol (GLYCOLAX) packet 17 g, 17 g, Oral, Daily PRN, Fan Parsons MD    acetaminophen (TYLENOL) tablet 650 mg, 650 mg, Oral, Q6H PRN **OR** acetaminophen (TYLENOL) suppository 650 mg, 650 mg, Rectal, Q6H PRN, Fan Parsons MD    ondansetron (ZOFRAN) injection 4 mg, 4 mg, Intravenous, Q6H PRN, Fan Parsons MD    enoxaparin (LOVENOX) injection 135 mg, 1 mg/kg, Subcutaneous, Q12H, Fan Parsons MD, 135 mg at 07/24/21 0144    piperacillin-tazobactam (ZOSYN) 3,375 mg in dextrose 5 % 50 mL IVPB extended infusion (mini-bag), 3,375 mg, Intravenous, Q8H, Fan Parsons MD, Stopped at 07/24/21 0645    albuterol sulfate  (90 Base) MCG/ACT inhaler 2 puff, 2 puff, Inhalation, Q6H PRN, Fan Parsons MD, 2 puff at 07/23/21 1127    pantoprazole (PROTONIX) injection 40 mg, 40 mg, Intravenous, Daily, 40 mg at 07/23/21 0848 **AND** sodium chloride (PF) 0.9 % injection 10 mL, 10 mL, Intravenous, Daily, Ebony Spivey MD, 10 mL at 07/23/21 0848    dexamethasone (PF) (DECADRON) injection 6 mg, 6 mg, Intravenous, Q12H, Ebony Spivey MD, 6 mg at 07/24/21 0143    glucose (GLUTOSE) 40 % oral gel 15 g, 15 g, Oral, PRN, Ebony Spivey MD    dextrose 50 % IV solution, 12.5 g, Intravenous, PRN, Ebony Spivey MD    glucagon (rDNA) injection 1 mg, 1 mg, Intramuscular, PRN, Tamara Jaramillo MD    dextrose 5 % solution, 100 mL/hr, Intravenous, PRN, Tamara Jaramillo MD    insulin lispro (HUMALOG) injection vial 0-6 Units, 0-6 Units, Subcutaneous, Q6H, Tamara Jaramillo MD, 1 Units at 07/24/21 0150    ipratropium-albuterol (DUONEB) nebulizer solution 1 ampule, 1 ampule, Inhalation, 4x daily, Tamara Jaramillo MD, 1 ampule at 07/23/21 1958    midazolam (VERSED) infusion 100mg/100mL, 1-10 mg/hr, Intravenous, Continuous, Alejandro Roque MD, Last Rate: 2 mL/hr at 07/24/21 0520, 2 mg/hr at 07/24/21 0520    propofol injection, 5-50 mcg/kg/min, Intravenous, Titrated, Alejandro Roque MD, Last Rate: 41 mL/hr at 07/24/21 0445, 50 mcg/kg/min at 07/24/21 0445    fentanyl (SUBLIMAZE) infusion 10 mcg/mL, 12.5-200 mcg/hr, Intravenous, Continuous, Alejandro Roque MD, Last Rate: 15 mL/hr at 07/24/21 0329, 150 mcg/hr at 07/24/21 0329        Assessment/plan  Principal Problem:    Pneumonia due to COVID-19 virus  Active Problems:    UTI (urinary tract infection)    Morbid exogenous obesity (HCC)    Asthma    Hypoxemia    Acute respiratory failure due to COVID-19 [facility])    Acute respiratory distress syndrome in adult [facility])    Elevated d-dimer    Acute kidney injury [facility])    Palliative care patient  Resolved Problems:    * No resolved hospital problems.  *      Sepsis (tachycardia, tachypnea, markedly elevated procalcitonin inflammatory markers) due to COVID-19 and secondary bacterial infection  Severe ARDS, P/F ratio less than 100  Acute hypoxemic respiratory failure due to pneumonia with COVID-19     --Continue mechanical ventilatory support with low tidal volume strategy per ARDS net protocol  --Remains on high FiO2 and PEEP  ---Follow the chest x-ray and ABG as needed  --Continue weaning trials  --Continue prone positioning as tolerated  --Adjust sedation with Propofol, Fentanyl, add Ketamine  --Hemodynamic stable, continue to follow  -Continue empiric broad-spectrum antibiotics for sepsis with suspected secondary bacterial infection  --Follow cultures  -Continue dexamethasone  -Received Tocilizumab  -Continue bronchodilators in setting of asthma  -Monitor labs, inflammatory markers, coagulation studies    Acute kidney injury, suspect possible ATN, renal septic injury  -Monitor labs, urine output    Monitor I's and O's, spot diuresis if remains net positive, dry lung strategy    Hyperglycemia, steroid-induced  Serial glucose monitoring with sliding scale insulin if needed    Continue empiric therapeutic anticoagulation with Lovenox given markedly elevated D-dimer, CTA chest deferred due to renal function and severe critical illness    Total critical care time of 45 minutes including patient evaluation, discussion with nursing, chart review, management plan, and assessment of response to treatment    Kristie Ansari MD 7/24/2021 7:50 AM

## 2021-07-24 NOTE — PROGRESS NOTES
Results for Kerry Hutchinson (MRN [id number]) as of 7/24/2021 14:19   Ref.  Range 7/24/2021 14:14   Hemoglobin, Art, Extended Latest Ref Range: 14.0 - 18.0 g/dL 14.4   pH, Arterial Latest Ref Range: 7.350 - 7.450  7.290 (LL)   pCO2, Arterial Latest Ref Range: 35.0 - 45.0 mmHg 52.0 (H)   pO2, Arterial Latest Ref Range: 80.0 - 100.0 mmHg 78.0 (L)   HCO3, Arterial Latest Ref Range: 22.0 - 26.0 mmol/L 25.0   Base Excess, Arterial Latest Ref Range: -2.0 - 2.0 mmol/L -2.3 (L)   O2 Sat, Arterial Latest Ref Range: >92 % 93.0   O2 Content, Arterial Latest Ref Range: Not Established mL/dL 18.9   Methemoglobin, Arterial Latest Ref Range: <1.5 % 2.0   Carboxyhgb, Arterial Latest Ref Range: 0.0 - 5.0 % 2.4   VC 26 450 +12 100%  ABG at

## 2021-07-25 LAB — URINE CULTURE, ROUTINE: NORMAL

## 2021-07-25 PROCEDURE — 82803 BLOOD GASES ANY COMBINATION: CPT

## 2021-07-25 PROCEDURE — 84132 ASSAY OF SERUM POTASSIUM: CPT

## 2021-07-25 NOTE — PROGRESS NOTES
Patient has been received by [facility] team and ecmo tx started. Tx received well. Patient transported out around [address] with flight crew and [employer]   Electronically signed by Arslan Bass RN on 7/25/2021 at 1:06 AM

## 2021-07-28 LAB
BLOOD CULTURE, ROUTINE: NORMAL
CULTURE, BLOOD 2: NORMAL

## 2021-07-29 LAB
CULTURE, RESPIRATORY: ABNORMAL
CULTURE, RESPIRATORY: ABNORMAL
GRAM STAIN RESULT: ABNORMAL
ORGANISM: ABNORMAL

## 2021-09-01 ENCOUNTER — HOSPITAL ENCOUNTER (OUTPATIENT)
Facility: HOSPITAL | Age: 36
Discharge: HOME | End: 2021-09-23
Attending: INTERNAL MEDICINE | Admitting: INTERNAL MEDICINE

## 2021-09-01 ENCOUNTER — APPOINTMENT (OUTPATIENT)
Dept: GENERAL RADIOLOGY | Facility: HOSPITAL | Age: 36
End: 2021-09-01

## 2021-09-01 LAB
ARTERIAL PATENCY WRIST A: ABNORMAL
ATMOSPHERIC PRESS: 748 MMHG
BASE EXCESS BLDA CALC-SCNC: 5.3 MMOL/L (ref 0–2)
BDY SITE: ABNORMAL
BODY TEMPERATURE: 37 C
GAS FLOW AIRWAY: 10 LPM
GLUCOSE BLDC GLUCOMTR-MCNC: 157 MG/DL (ref 70–130)
HCO3 BLDA-SCNC: 30.1 MMOL/L (ref 20–26)
INHALED O2 CONCENTRATION: 60 %
Lab: ABNORMAL
MODALITY: ABNORMAL
PCO2 BLDA: 44.4 MM HG (ref 35–45)
PCO2 TEMP ADJ BLD: 44.4 MM HG (ref 35–45)
PH BLDA: 7.44 PH UNITS (ref 7.35–7.45)
PH, TEMP CORRECTED: 7.44 PH UNITS (ref 7.35–7.45)
PO2 BLDA: 83.3 MM HG (ref 83–108)
PO2 TEMP ADJ BLD: 83.3 MM HG (ref 83–108)
SAO2 % BLDCOA: 97.5 % (ref 94–99)
VENTILATOR MODE: ABNORMAL

## 2021-09-01 PROCEDURE — 82962 GLUCOSE BLOOD TEST: CPT

## 2021-09-01 PROCEDURE — 25010000002 CEFTAZIDIME PER 500 MG: Performed by: INTERNAL MEDICINE

## 2021-09-01 PROCEDURE — 99254 IP/OBS CNSLTJ NEW/EST MOD 60: CPT | Performed by: INTERNAL MEDICINE

## 2021-09-01 PROCEDURE — 71045 X-RAY EXAM CHEST 1 VIEW: CPT

## 2021-09-01 PROCEDURE — 25010000002 ENOXAPARIN PER 10 MG: Performed by: INTERNAL MEDICINE

## 2021-09-01 PROCEDURE — 82803 BLOOD GASES ANY COMBINATION: CPT

## 2021-09-01 PROCEDURE — 63710000001 INSULIN DETEMIR PER 5 UNITS: Performed by: INTERNAL MEDICINE

## 2021-09-01 RX ORDER — FAMOTIDINE 40 MG/5ML
20 POWDER, FOR SUSPENSION ORAL EVERY 12 HOURS SCHEDULED
Status: DISCONTINUED | OUTPATIENT
Start: 2021-09-01 | End: 2021-09-09

## 2021-09-01 RX ORDER — LOPERAMIDE HYDROCHLORIDE 2 MG/1
2 CAPSULE ORAL 4 TIMES DAILY PRN
Status: DISCONTINUED | OUTPATIENT
Start: 2021-09-01 | End: 2021-09-23 | Stop reason: HOSPADM

## 2021-09-01 RX ORDER — SACCHAROMYCES BOULARDII 250 MG
250 CAPSULE ORAL 2 TIMES DAILY
Status: DISCONTINUED | OUTPATIENT
Start: 2021-09-01 | End: 2021-09-23 | Stop reason: HOSPADM

## 2021-09-01 RX ORDER — DEXTROSE MONOHYDRATE 25 G/50ML
25 INJECTION, SOLUTION INTRAVENOUS
Status: DISCONTINUED | OUTPATIENT
Start: 2021-09-01 | End: 2021-09-23 | Stop reason: HOSPADM

## 2021-09-01 RX ORDER — ONDANSETRON 2 MG/ML
4 INJECTION INTRAMUSCULAR; INTRAVENOUS EVERY 6 HOURS PRN
Status: DISCONTINUED | OUTPATIENT
Start: 2021-09-01 | End: 2021-09-23 | Stop reason: HOSPADM

## 2021-09-01 RX ORDER — CHLORHEXIDINE GLUCONATE 0.12 MG/ML
15 RINSE ORAL EVERY 12 HOURS SCHEDULED
Status: DISCONTINUED | OUTPATIENT
Start: 2021-09-01 | End: 2021-09-09

## 2021-09-01 RX ORDER — TRAZODONE HYDROCHLORIDE 50 MG/1
25 TABLET ORAL NIGHTLY PRN
Status: DISCONTINUED | OUTPATIENT
Start: 2021-09-01 | End: 2021-09-23 | Stop reason: HOSPADM

## 2021-09-01 RX ORDER — ONDANSETRON 4 MG/1
4 TABLET, FILM COATED ORAL EVERY 6 HOURS PRN
Status: DISCONTINUED | OUTPATIENT
Start: 2021-09-01 | End: 2021-09-23 | Stop reason: HOSPADM

## 2021-09-01 RX ORDER — POLYETHYLENE GLYCOL 3350 17 G/17G
17 POWDER, FOR SOLUTION ORAL EVERY 12 HOURS SCHEDULED
Status: DISCONTINUED | OUTPATIENT
Start: 2021-09-01 | End: 2021-09-08

## 2021-09-01 RX ORDER — MINERAL OIL, PETROLATUM 425; 568 MG/G; MG/G
OINTMENT OPHTHALMIC 4 TIMES DAILY
Status: DISCONTINUED | OUTPATIENT
Start: 2021-09-01 | End: 2021-09-23 | Stop reason: HOSPADM

## 2021-09-01 RX ORDER — NICOTINE POLACRILEX 4 MG
15 LOZENGE BUCCAL
Status: DISCONTINUED | OUTPATIENT
Start: 2021-09-01 | End: 2021-09-23 | Stop reason: HOSPADM

## 2021-09-01 RX ORDER — ACETAMINOPHEN 650 MG/1
650 SUPPOSITORY RECTAL EVERY 4 HOURS PRN
Status: DISCONTINUED | OUTPATIENT
Start: 2021-09-01 | End: 2021-09-05

## 2021-09-01 RX ORDER — SODIUM CHLORIDE 0.9 % (FLUSH) 0.9 %
10 SYRINGE (ML) INJECTION AS NEEDED
Status: DISCONTINUED | OUTPATIENT
Start: 2021-09-01 | End: 2021-09-23 | Stop reason: HOSPADM

## 2021-09-01 RX ORDER — ALBUTEROL SULFATE 90 UG/1
6 AEROSOL, METERED RESPIRATORY (INHALATION) EVERY 4 HOURS PRN
Status: DISCONTINUED | OUTPATIENT
Start: 2021-09-01 | End: 2021-09-23 | Stop reason: HOSPADM

## 2021-09-01 RX ORDER — SODIUM CHLORIDE 0.9 % (FLUSH) 0.9 %
10 SYRINGE (ML) INJECTION EVERY 12 HOURS
Status: DISCONTINUED | OUTPATIENT
Start: 2021-09-01 | End: 2021-09-23 | Stop reason: HOSPADM

## 2021-09-01 RX ORDER — ACETAMINOPHEN 160 MG/5ML
500 SOLUTION ORAL EVERY 8 HOURS SCHEDULED
Status: DISCONTINUED | OUTPATIENT
Start: 2021-09-01 | End: 2021-09-05

## 2021-09-01 RX ORDER — ACETAMINOPHEN 160 MG/5ML
650 SOLUTION ORAL EVERY 4 HOURS PRN
Status: DISCONTINUED | OUTPATIENT
Start: 2021-09-01 | End: 2021-09-05

## 2021-09-02 LAB
ALBUMIN SERPL-MCNC: 4.1 G/DL (ref 3.5–5.2)
ALBUMIN/GLOB SERPL: 1.3 G/DL
ALP SERPL-CCNC: 104 U/L (ref 39–117)
ALT SERPL W P-5'-P-CCNC: 389 U/L (ref 1–41)
ANION GAP SERPL CALCULATED.3IONS-SCNC: 10 MMOL/L (ref 5–15)
AST SERPL-CCNC: 171 U/L (ref 1–40)
BILIRUB SERPL-MCNC: 0.4 MG/DL (ref 0–1.2)
BUN SERPL-MCNC: 20 MG/DL (ref 6–20)
BUN/CREAT SERPL: 43.5 (ref 7–25)
CALCIUM SPEC-SCNC: 9.3 MG/DL (ref 8.6–10.5)
CHLORIDE SERPL-SCNC: 108 MMOL/L (ref 98–107)
CO2 SERPL-SCNC: 28 MMOL/L (ref 22–29)
CREAT SERPL-MCNC: 0.46 MG/DL (ref 0.76–1.27)
DEPRECATED RDW RBC AUTO: 57.4 FL (ref 37–54)
ERYTHROCYTE [DISTWIDTH] IN BLOOD BY AUTOMATED COUNT: 16.7 % (ref 12.3–15.4)
GFR SERPL CREATININE-BSD FRML MDRD: >150 ML/MIN/1.73
GLOBULIN UR ELPH-MCNC: 3.1 GM/DL
GLUCOSE BLDC GLUCOMTR-MCNC: 104 MG/DL (ref 70–130)
GLUCOSE BLDC GLUCOMTR-MCNC: 124 MG/DL (ref 70–130)
GLUCOSE BLDC GLUCOMTR-MCNC: 148 MG/DL (ref 70–130)
GLUCOSE BLDC GLUCOMTR-MCNC: 150 MG/DL (ref 70–130)
GLUCOSE SERPL-MCNC: 99 MG/DL (ref 65–99)
HCT VFR BLD AUTO: 33.8 % (ref 37.5–51)
HGB BLD-MCNC: 10.1 G/DL (ref 13–17.7)
MCH RBC QN AUTO: 28.4 PG (ref 26.6–33)
MCHC RBC AUTO-ENTMCNC: 29.9 G/DL (ref 31.5–35.7)
MCV RBC AUTO: 94.9 FL (ref 79–97)
PLATELET # BLD AUTO: 387 10*3/MM3 (ref 140–450)
PMV BLD AUTO: 9.8 FL (ref 6–12)
POTASSIUM SERPL-SCNC: 3.9 MMOL/L (ref 3.5–5.2)
PREALB SERPL-MCNC: 35.5 MG/DL (ref 20–40)
PROT SERPL-MCNC: 7.2 G/DL (ref 6–8.5)
RBC # BLD AUTO: 3.56 10*6/MM3 (ref 4.14–5.8)
SODIUM SERPL-SCNC: 146 MMOL/L (ref 136–145)
WBC # BLD AUTO: 11.24 10*3/MM3 (ref 3.4–10.8)

## 2021-09-02 PROCEDURE — 85027 COMPLETE CBC AUTOMATED: CPT | Performed by: INTERNAL MEDICINE

## 2021-09-02 PROCEDURE — 97535 SELF CARE MNGMENT TRAINING: CPT | Performed by: OCCUPATIONAL THERAPIST

## 2021-09-02 PROCEDURE — 99233 SBSQ HOSP IP/OBS HIGH 50: CPT | Performed by: INTERNAL MEDICINE

## 2021-09-02 PROCEDURE — 97162 PT EVAL MOD COMPLEX 30 MIN: CPT

## 2021-09-02 PROCEDURE — 25010000002 CEFTAZIDIME PER 500 MG: Performed by: INTERNAL MEDICINE

## 2021-09-02 PROCEDURE — 25010000002 ENOXAPARIN PER 10 MG: Performed by: INTERNAL MEDICINE

## 2021-09-02 PROCEDURE — 63710000001 INSULIN DETEMIR PER 5 UNITS: Performed by: INTERNAL MEDICINE

## 2021-09-02 PROCEDURE — 97167 OT EVAL HIGH COMPLEX 60 MIN: CPT | Performed by: OCCUPATIONAL THERAPIST

## 2021-09-02 PROCEDURE — 63710000001 INSULIN LISPRO (HUMAN) PER 5 UNITS: Performed by: INTERNAL MEDICINE

## 2021-09-02 PROCEDURE — 80053 COMPREHEN METABOLIC PANEL: CPT | Performed by: INTERNAL MEDICINE

## 2021-09-02 PROCEDURE — 82962 GLUCOSE BLOOD TEST: CPT

## 2021-09-02 PROCEDURE — 92610 EVALUATE SWALLOWING FUNCTION: CPT

## 2021-09-02 PROCEDURE — 84134 ASSAY OF PREALBUMIN: CPT | Performed by: INTERNAL MEDICINE

## 2021-09-02 RX ORDER — IPRATROPIUM BROMIDE AND ALBUTEROL SULFATE 2.5; .5 MG/3ML; MG/3ML
3 SOLUTION RESPIRATORY (INHALATION)
Status: DISCONTINUED | OUTPATIENT
Start: 2021-09-02 | End: 2021-09-11

## 2021-09-02 NOTE — PROGRESS NOTES
PULMONARY AND CRITICAL CARE PROGRESS NOTE - [facility]    Patient: Reggie Treviño    1985   Acct# [id number]  09/02/21   11:47 CDT  Referring Provider: Shahab Garcia MD  Chief Complaint: Covid 19 viral pneumonia, Acute hypoxic respiratory failure    Interval history: Patient is sitting up in the bedside chair on Trach collar .40 Fio2 with sat of 95%. He is eating lunch and states appetite is pretty good. He has been off of the vent for 4 days now. He reports a slight cough with productive of regular sputum. He has been working with PT and doing well.     Patient has a very complex medical history.  He presented with a COVID-19 pneumonia in the local hospital and later transferred to [facility].  He needed intubation and mechanical ventilation and eventually transferred to [facility] for ECMO.  His hospital stay was further complicated by sepsis encephalopathy pneumonia requiring prolonged antibiotic coverage and he still has low-grade fever.    Review of Systems: Review of Systems   Constitutional: Positive for fatigue and fever.   HENT: Positive for congestion and postnasal drip.    Eyes: Negative.    Respiratory: Positive for cough, chest tightness and shortness of breath.         Sleep disturbances   Cardiovascular: Negative for chest pain and leg swelling.   Gastrointestinal: Negative.    Endocrine: Negative.    Genitourinary: Negative.    Musculoskeletal: Positive for arthralgias and myalgias.   Skin: Negative.    Allergic/Immunologic: Positive for environmental allergies.   Neurological: Positive for weakness and headaches.   Hematological: Negative.    Psychiatric/Behavioral: Positive for agitation and behavioral problems. The patient is nervous/anxious.         Patient has agitation and delirium and currently improved.     Physical Exam:  Vital signs: T:98.7   BP:86/67   P:93   R:24   Sat:95  Physical Exam   Vitals reviewed.   Constitutional:        General: He is not in acute distress.     Appearance: Normal appearance. He is obese.      Comments: Morbidly obese young  male alert and awake in no acute distress   HENT:      Head: Normocephalic and atraumatic.   Eyes:      General: No scleral icterus.        Right eye: No discharge.         Left eye: No discharge.      Neck:      Comments: Size 8 XLT tracheostomy tube in place and neck.  Cardiovascular:      Rate and Rhythm: Regular rhythm. Regular rate      Pulses: Normal pulses.      Heart sounds: Normal heart sounds. No murmur heard.   No friction rub. No gallop.    Pulmonary:      Effort: Pulmonary effort is normal. No respiratory distress.      Breath sounds: No stridor. No wheezing or rales.      Comments: Diminished breath sound both lungs at the bases.  Abdominal:      General: Abdomen is flat. Bowel sounds are normal. There is no distension.      Palpations: There is mass.      Genitourinary:     Comments: Not examined  Musculoskeletal:         Right lower leg: No edema.      Left lower leg: No edema.   Lymphadenopathy:      Cervical: No cervical adenopathy.   Skin:     General: Skin is warm.        Comments: Chronic skin changes noted in lower extremities.  He has a decubitus ulcer stage II on the buttock.   Neurological:      General: No focal deficit present.      Mental Status: He is alert and oriented to person, place, and time. Mental status is at baseline.      Motor: Weakness present.   Psychiatric:         Mood and Affect: Mood normal.         Behavior: Behavior normal.         Thought Content: Thought content normal.     Results from last 7 days   Lab Units 09/02/21  0432   WBC 10*3/mm3 11.24*   HEMOGLOBIN g/dL 10.1*   PLATELETS 10*3/mm3 387     Results from last 7 days   Lab Units 09/02/21  0432 08/31/21  1502 08/30/21  0353   SODIUM mmol/L 146* 149*  --    POTASSIUM mmol/L 3.9  --  3.3   BUN mg/dL 20  --   --    CREATININE mg/dL 0.46*  --   --      Results from last 7 days   Lab  Units 09/01/21  2308 08/27/21  0309   PH, ARTERIAL pH units 7.440 7.50*   PCO2, ARTERIAL mm Hg 44.4 55*   PO2 ART mm Hg 83.3  --    FIO2 % 60  --      No results found for: BLOODCX, URINECX, RESPCX  Recent films:  XR Chest 1 View    Result Date: 9/1/2021  1.. Diffuse interstitial pneumonia. 2. Tracheostomy tube well-positioned. This report was finalized on 09/01/2021 22:31 by Dr. Hemal Mast MD.    Films reviewed personally by me.  My interpretation: No new imaging 9/2/2021    Pulmonary Assessment:    1. Acute on chronic hypoxic respiratory failure  2. S/p mechanical ventilation and tracheostomy placement currently liberated from ventilator on tracheostomy collar  3. Covid 19 pneumonia with prolonged respiratory failure and ARDS  4. Encephalopathy and agitated delirium improved  5. Small subarachnoid hemorrhage and intracranial hemorrhage stable  6. Conjunctival chemosis  7. Transaminitis  8. Morbid obesity with possible obstructive sleep apnea  9. Status post ECMO treatment  10. E. coli, stenotrophomonas and MSSA pneumonia and sepsis on antibiotic treatment  11. Anxiety and depression, PTSD  12. Allergic rhinitis  13. Hyperglycemia likely steroid-induced     Recommend/plan:   ·   · Continue on tracheostomy collar.  Routine tracheostomy care.  ENT has been consulted.  · Titrate FiO2 to maintain oxygen saturation more than 92%.   · Routine bronchodilator treatment  · Encourage incentive spirometry and flutter valve to improve pulmonary compliance and clearance  · [place] valve will be used as needed.  · Continue blood pressure and glycemic control his insulin as needed.  · Plan for outpatient work-up for obstructive sleep apnea due to his obesity.  · Current plan is to downsize the tracheostomy and eventually decannulate him if remains stable.  ·  PT/OT/nutritional support/speech therapy evaluation  · Continue current treatment and supportive care.  Continue wound care for decubitus ulcer.  · CODE STATUS:  Full.  Overall prognosis:  stable.  · Pulmonary will sign off. Available if needed.     Electronically signed by SRIKANTH Rosas on 9/2/2021 at 11:47 CDT    ATTESTATION OF CLINICAL NOTE:  I have reviewed the notes, assessments, and/or procedures performed by SRIKANTH Dave, I concur with her/his documentation of Reggie Treviño.  Patient was seen in the follow-up visit in LTAC unit today.    He was transferred from [facility] last night.  He is doing well and his oxygen saturation maintained well on tracheostomy collar and he is on 30% FiO2 tracheostomy collar and according to the RT he is requesting his trach to be capped or use Passy-Gonzalez valve if possible.  ENT has been consulted for tracheostomy management.  He does not have a PEG tube and is on oral diet.  He is getting routine bronchodilator treatment and respiratory care and doing some incentive spirometry and flutter valve.  He will get his tracheostomy downsized and eventually may be decannulated.  He may have sleep apnea and will need outpatient sleep study and follow-up in the pulmonary clinic.  He is a non-smoker and did not have any prior lung problem.    On physical examination patient is a morbidly obese  young male in no distress but HEENT: Atraumatic normocephalic.  Neck: Supple tracheostomy tube in place.  Heart sounds: Rate and rhythm regular.  Lungs: Bibasilar crackles and diminished air entry.  Abdomen: Soft nontender bowel sounds present.  Extremities: No edema normal pulses normal color.  Neurologic: Grossly intact.  Skin: No breakdown.    Continue current treatment plan and tracheostomy collar.  Use Passy-Gonzalez valve for tracheostomy If tolerated.  Titrate oxygen as needed.  He is doing well from the respiratory standpoint and did not have any bronchospasm wheezing and currently getting routine bronchodilator treatment.  Has been consulted and expected to be managing tracheostomy.  Routine trach  care.  Pulmonary toilet.  Continue PT OT speech therapy.  Continue supportive care.  Patient completed treatment for COVID-19 and is on adjunct treatment.  Blood pressure and glycemic control will be monitored and insulin will be adjusted for better glycemic control.  Repeat labs and imaging studies from time to time.  Increase mobility as tolerated.  Plan for outpatient pulmonary clinic follow-up with a sleep study for possible underlying sleep apnea.  CODE STATUS: Full.  Overall prognosis: Improving and stable.  As he continues to improve pulmonary will sign off but will be happy to see him back again if requested.  We appreciate the consult from Dr. Garcia like to thank him for the referral.  Please make outpatient follow-up appointment with pulmonary clinic at the time of discharge.      I have seen and examined patient personally, performing a face-to-face diagnostic evaluation with plan of care reviewed and developed with APRN and nursing staff. I have addended and/or modified the above history of present illness, physical examination, and assessment and plan to reflect my findings and impressions. Essential elements of the care plan were discussed with APRN above.  Agree with findings and assessment/plan as documented above.    Onofre Yee MD  Pulmonologist/Intensivist  9/3/2021 01:02 CDT

## 2021-09-02 NOTE — PROGRESS NOTES
LTACH Fall Assessment Note    Reggie Treviño is a 36 y.o.male admitted to [facility] on 9/1/2021  8:21 PM.    Current medications associated with an increased risk for fall include:    •  insulin detemir (LEVEMIR) injection 5 Units, Subcutaneous, Q12H  •  insulin lispro (humaLOG) injection 0-14 Units, Subcutaneous, 4x Daily AC & at Bedtime  •  loperamide (IMODIUM) capsule 2 mg, Oral, 4x Daily PRN  •  polyethylene glycol (MIRALAX) packet 17 g, Oral, Q12H  •  traZODone (DESYREL) tablet 25 mg, Oral, Nightly PRN    Anselmo Castillo, PharmRADHA  09/01/21 21:14 CDT

## 2021-09-02 NOTE — H&P
"[facility] APR[facility]          Internal Medicine History and Physical      Name: Reggie Treviño  MRN: [id number]     Acct: [id number]  Room: [facility]/1    Admit Date: 9/1/2021  PCP: Terrell Navas DO    Chief Complaint:     weakness    History Obtained From:     chart review and the patient    History of Present Illness:      Reggie Treviño is a  36 y.o.  male who presents with need for continue oxygen weaning and rehabilitation efforts following recent lengthy acute care stay. The patient had been in his usual state of health when he was diagnosed with COVID-19 on 7/19. Initially, he attempted outpatient treatment, but had a rapid decline with increased shortness of breath and presented to [facility] on 7/21. He required urgent intubation and mechanical ventilation and was treated with IV steroids, azithromycin and IV remdesivir. He had a continued decline and was transferred to [facility] for higher level of care. He was treated with tocilizumab upon transfer. Despite maximum ventilator support, he had continued hypoxia as well as decline in renal function. He transferred to [facility] on 7/25 for ECMO. The patient was treated with ECMO and a second ECMO circuit was added 8/11. He was decannulated on 8/25 and post decannulation dopplers showed no evidence of DVT. RIJ cannula site suture may be discontinued on 9/7. The patient was unable to liberate from the ventilator and underwent tracheostomy tube placement on 7/30. He was weaned to trach collar, is tolerating PMV and has been evaluated and started on a po diet which he is tolerating without difficulty. He required heavy sedation for quite some time as we as a long opioid and benzodiazepine taper. The patient appeared to be \"crying blood\" and was evaluated by ophthalmology on 8/16. The patient was noted to have chemosis of bilateral conjunctivae. He was treated conservatively, but has had significant improvement in the " appearance of the hemorrhages as well as visual acuity. Sputum culture obtained 7/27 returne dpositive for MSSA and stenotrophomonas. Patient was treated with bactrim. The patient had continued temperature spikes associated with a rash despite antibiotic therapy. Bactrim was stopped and listed as an allergy after dermatology confirmed DRESS with biopsy. Blood and sputum cultures obtained 8/6 as part of a work up for fever returned positive for e. Coli and patient completed a course of IV levaquin.  Blood cultures were obtained 8/26 as part of workup for fever and returned positive for stenotrophomonas. PICC line was discontinued and repeat/surveillance cultures returned negative. Ceftazidime was initiated with recommendations to complete a 14 day course with stop date 9/9. Sputum cultures also positive for stenotrophomonas at that time. MRI was obtained and neurosurgery consulted for small IVH and possible frontal SAH. It appeared there was no acute bleed and conservative management recommended. The patient transferred to our facility for continued oxygen weaning and rehabilitation efforts.     Past Medical History:     Past Medical History:   Diagnosis Date    Kidney stone         Past Surgical History:     Tracheostomy    Medications Prior to Admission:       See chart    Allergies:       Sulfa antibiotics    Social History:     Tobacco:    has no history on file for tobacco use.  Alcohol:      has no history on file for alcohol use.  Drug Use:  has no history on file for drug use.    Family History:     No family history on file.    Review of Systems:     Review of Systems   Constitutional: Positive for malaise/fatigue. Negative for chills, decreased appetite, weight gain and weight loss.   HENT: Negative for congestion, ear discharge, hoarse voice and tinnitus.    Eyes: Negative for blurred vision, discharge, visual disturbance and visual halos.   Cardiovascular: Negative for chest pain, claudication, dyspnea on  "exertion, irregular heartbeat, leg swelling, orthopnea and paroxysmal nocturnal dyspnea.   Respiratory: Negative for cough, shortness of breath, sputum production and wheezing.    Endocrine: Negative for cold intolerance, heat intolerance and polyuria.   Hematologic/Lymphatic: Negative for adenopathy. Does not bruise/bleed easily.   Skin: Negative for dry skin, itching and suspicious lesions.   Musculoskeletal: Negative for arthritis, back pain, falls, joint pain, muscle weakness and myalgias.   Gastrointestinal: Negative for abdominal pain, constipation, diarrhea, dysphagia and hematemesis.   Genitourinary: Negative for bladder incontinence, dysuria and frequency.   Neurological: Positive for weakness. Negative for aphonia, disturbances in coordination and dizziness.   Psychiatric/Behavioral: Negative for altered mental status, depression, memory loss and substance abuse. The patient does not have insomnia and is not nervous/anxious.        Code Status:    There are no questions and answers to display.       Physical Exam:     Vitals:  Ht 172.7 cm (68\")   Wt 133 kg (294 lb)   BMI 44.70 kg/m²   T 98.7 P 67 R 24 BP 86/67 Sp02 98% (trach collar 40%)  Physical Exam  Vitals and nursing note reviewed.   Constitutional:       Appearance: He is well-developed.   HENT:      Head: Normocephalic and atraumatic.      Nose: Nose normal.   Eyes:      Pupils: Pupils are equal, round, and reactive to light.   Neck:      Comments: Trach to collar with PMV  Cardiovascular:      Rate and Rhythm: Normal rate and regular rhythm.      Heart sounds: Normal heart sounds.   Pulmonary:      Effort: Pulmonary effort is normal.      Breath sounds: Normal breath sounds.   Abdominal:      General: Bowel sounds are normal.      Palpations: Abdomen is soft.      Comments: obese   Musculoskeletal:         General: Normal range of motion.      Cervical back: Normal range of motion and neck supple.      Comments: Generalized weakness   Skin:     " General: Skin is warm and dry.   Neurological:      Mental Status: He is alert and oriented to person, place, and time.      Deep Tendon Reflexes: Reflexes are normal and symmetric.   Psychiatric:         Behavior: Behavior normal.               Data:     Lab Results (last 7 days)       Procedure Component Value Units Date/Time    Prealbumin [[id number]]  (Normal) Collected: 09/02/21 0432    Specimen: Blood Updated: 09/02/21 1405     Prealbumin 35.5 mg/dL     POC Glucose Once [[id number]]  (Abnormal) Collected: 09/02/21 1302    Specimen: Blood Updated: 09/02/21 1313     Glucose 148 mg/dL      Comment: : [email or web address] Cespedes Go[employer] ResourcesoleMeter ID: [id number]       POC Glucose Once [[id number]]  (Normal) Collected: 09/02/21 0811    Specimen: Blood Updated: 09/02/21 0822     Glucose 124 mg/dL      Comment: : [email or web address] Cespedes [employer] ResourcesoleMeter ID: [id number]       Comprehensive Metabolic Panel [[id number]]  (Abnormal) Collected: 09/02/21 0432    Specimen: Blood Updated: 09/02/21 0610     Glucose 99 mg/dL      BUN 20 mg/dL      Creatinine 0.46 mg/dL      Sodium 146 mmol/L      Potassium 3.9 mmol/L      Chloride 108 mmol/L      CO2 28.0 mmol/L      Calcium 9.3 mg/dL      Total Protein 7.2 g/dL      Albumin 4.10 g/dL      ALT (SGPT) 389 U/L      AST (SGOT) 171 U/L      Alkaline Phosphatase 104 U/L      Total Bilirubin 0.4 mg/dL      eGFR Non African Amer >150 mL/min/1.73      Globulin 3.1 gm/dL      A/G Ratio 1.3 g/dL      BUN/Creatinine Ratio 43.5     Anion Gap 10.0 mmol/L     Narrative:      GFR Normal >60  Chronic Kidney Disease <60  Kidney Failure <15      CBC (No Diff) [[id number]]  (Abnormal) Collected: 09/02/21 0432    Specimen: Blood Updated: 09/02/21 0546     WBC 11.24 10*3/mm3      RBC 3.56 10*6/mm3      Hemoglobin 10.1 g/dL      Hematocrit 33.8 %      MCV 94.9 fL      MCH 28.4 pg      MCHC 29.9 g/dL      RDW 16.7 %      RDW-SD 57.4 fl      MPV 9.8 fL      Platelets 387 10*3/mm3     Blood Gas, Arterial -  [[id number]]  (Abnormal) Collected: 09/01/21 2308    Specimen: Arterial Blood Updated: 09/01/21 2313     Site Right Brachial     Alexey's Test N/A     pH, Arterial 7.440 pH units      pCO2, Arterial 44.4 mm Hg      pO2, Arterial 83.3 mm Hg      HCO3, Arterial 30.1 mmol/L      Comment: 83 Value above reference range        Base Excess, Arterial 5.3 mmol/L      Comment: 83 Value above reference range        O2 Saturation, Arterial 97.5 %      Temperature 37.0 C      Barometric Pressure for Blood Gas 748 mmHg      Modality Aerosol Trach Collar     FIO2 60 %      Flow Rate 10.0 lpm      Ventilator Mode NA     Collected by PAULETTE MEJIAS RRT     Comment: Meter: [id number]     :  [id number]        pCO2, Temperature Corrected 44.4 mm Hg      pH, Temp Corrected 7.440 pH Units      pO2, Temperature Corrected 83.3 mm Hg     POC Glucose Once [[id number]]  (Abnormal) Collected: 09/01/21 2229    Specimen: Blood Updated: 09/01/21 2248     Glucose 157 mg/dL      Comment: : SELVIN NegreteaMeter ID: [id number]             XR Chest 1 View    Result Date: 9/1/2021  Narrative: EXAMINATION: Chest 1 view 9/1/2021  HISTORY: Post Covid.  FINDINGS: Upright frontal projection of chest demonstrates diffuse interstitial pneumonia. There is mild cardiomegaly. A tracheostomy tube is well-positioned. There is no pneumothorax or pneumomediastinum.      Impression: 1.. Diffuse interstitial pneumonia. 2. Tracheostomy tube well-positioned. This report was finalized on 09/01/2021 22:31 by Dr. Hemal Mast MD.        Assessment:           * No active hospital problems. *    Past Medical History:   Diagnosis Date    Kidney stone        Plan:     Acute hypoxic respiratory failure secondary to COVID 19  Dysphagia  Bilateral chemosis  Stenotrophomonas pneumonia  Morbid obesity  PTSD  Hyperglycemia    Continue current treatment. Monitor counts. Increase activity as tolerated. Glycemic control. Continue IV antibiotics. Oxygen  weaning under direction of pulmonology. Labs Tuesday. Advance diet as tolerated.       Electronically signed by SRIKANTH Fulton on 9/2/2021 at 14:35 CDT     Copy sent to Terrell Duenas,   I have discussed the care of Reggie Treviño, including pertinent history and exam findings, with the nurse practitioner.    I have seen and examined the patient and the key elements of all parts of the encounter have been performed by me.  I agree with the assessment, plan and orders as documented by SRIKANTH Delgado, after I modified the exam findings and the plan of treatments and the final version is my approved version of the assessment.        Electronically signed by Shahab Garcia MD on 9/3/2021 at 23:31 CDT

## 2021-09-02 NOTE — CONSULTS
PULMONARY AND CRITICAL CARE CONSULT - [facility]    Reggie Treviño   MR# [id number]  Acct# [id number]  9/1/2021   21:46 CDT    Referring Provider: Shahab Garcia MD    Chief Complaint:   Covid 19 viral pneumonia  Acute hypoxic respiratory failure  S/p tracheostomy placement  He was on mechanical ventilation  Status post ECMO  E. coli bacteremia  E. coli pneumonia  MSSA/Sternotrophomonus pneumonia  Still on IV antibiotic treatment with ceftazidime  Chemosis of conjunctiva  Transaminitis  Obesity  Encephalopathy resolved  DRESS syndrome  Small intracranial hemorrhage and subarachnoid hemorrhage  PTSD anxiety and depression  Decubitus ulcer.    HPI: We are consulted by Shahab Gracia MD to see this 36 y.o. male born on 1985.  Patient was seen as inpatient pulmonary consult in LTAC unit today..  He was transferred from [facility] this evening.    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  In reviewing his records it appears he has a complex medical history patient was initially admitted in Psychiatric in [facility] on 7/21/2021 with acute respiratory failure and COVID-19 pneumonia.  His presenting symptoms were shortness of breath and hypoxia and chest x-ray shows bilateral pneumonia with progressive respiratory failure and ARDS.  He needed intubation on 7/22/2021.  He was transferred to [facility] on the same day.  The patient was noted to have refractory hypoxemia with maximum ventilator support and eventually he was started on VV ECMO treatment on 7/25 and he had a right internal jugular 25 Croatian and right femoral vein 27 Croatian catheter placed and was transferred to [facility].    He had a prolonged stay in the [facility] still today.  Continued on ECMO in in reviewing his treatment it appears patient received dexamethasone treatment up to 7/31/2021 and completed  remdesivir treatment 727.  He also received Tocilizumab in the [facility] prior to transfer to [facility].  He had prolonged ARDS and difficulty in weaning from the mechanical ventilation and eventually needed a size 8 Shiley X LT tracheostomy tube placement.  He had significant agitation and delirium and needed heavy sedation during the hospital stay and developed critical care myopathy and polyneuropathy.  Patient also had a history of PTSD because he was a combat  prior to hospitalization although he was not on any medications.  Patient developed stenotrophomonas bacteremia and MSSA bacteremia and pneumonia and later developed E. coli pneumonia and sepsis and received prolonged course of antibiotics.  He had a PICC line in place which was removed the stenotrophomonas culture was noted in the 8/26 report for work-up for fever.  He is still receiving antibiotic with ceftazidime 2 g every 8 hours which will be continued till 9 September 2021.  It was noted patient also had ventilator sedated E. coli pneumonia and he received ceftriaxone which was later changed to levofloxacin treatment per ID recommendation which was continued till 8/26/2021.  He also had a episode of MSSA and stenotrophomonas growing in the ECMO circuit and completed 14-day course of Bactrim from 7/22/2018.  He had a concern about DR ESS syndrome and dermatologist consulted and biopsy was obtained and diagnosis was confirmed.    He was eventually liberated from the ventilator and currently on the tracheostomy collar with 60% FiO2 and appears to be alert and awake.  The patient had altered mental status and MRI of the brain in the outside hospital shows there is a small intraventricular hemorrhage and possible frontal subarachnoid hemorrhage neurosurgery is consulted CT angiography of the head and neck did not show any acute bleed and no surgical intervention was needed.  The patient was stable at the time of transfer and  is oxygenating well on tracheostomy collar and was able to talk to me with a speaking valve in place.  He does not have any nasogastric tube or PEG tube and he is on oral diet.  He has a decubitus ulcer stage II on the buttock.      Past Medical History   has a past medical history of Kidney stone.   has no past surgical history on file.  Allergies   Allergen Reactions   • Sulfa Antibiotics Rash     Medications  Current medications and transfer medications reviewed.     Social History   Patient is single.  No history of smoking alcohol use or substance abuse.  He is a combat .  Family History  family history is not on file.  Review of Systems:  Review of Systems   Constitutional: Positive for fatigue and fever.   HENT: Positive for congestion and postnasal drip.    Eyes: Negative.    Respiratory: Positive for cough, chest tightness and shortness of breath.         Sleep disturbances   Cardiovascular: Negative for chest pain and leg swelling.   Gastrointestinal: Negative.    Endocrine: Negative.    Genitourinary: Negative.    Musculoskeletal: Positive for arthralgias and myalgias.   Skin: Negative.    Allergic/Immunologic: Positive for environmental allergies.   Neurological: Positive for weakness and headaches.   Hematological: Negative.    Psychiatric/Behavioral: Positive for agitation and behavioral problems. The patient is nervous/anxious.         Patient has agitation and delirium and currently improved.   :    Physical Exam:    Current vital signs were blood pressure 125/53, respiration 20, pulse 97, temperature 99.41, oxygen saturation 96% on 60% FiO2 with tracheostomy collar.  He had a size 8 XLT cuffless tracheostomy in place.    Physical Exam  Vitals reviewed.   Constitutional:       General: He is not in acute distress.     Appearance: Normal appearance. He is obese.      Comments: Morbidly obese young  male alert and awake in no acute distress   HENT:      Head: Normocephalic and  atraumatic.      Right Ear: There is no impacted cerumen.      Left Ear: There is no impacted cerumen.      Nose: Rhinorrhea present. No congestion.      Mouth/Throat:      Mouth: Mucous membranes are moist.      Pharynx: Oropharynx is clear. Posterior oropharyngeal erythema present. No oropharyngeal exudate.   Eyes:      General: No scleral icterus.        Right eye: No discharge.         Left eye: No discharge.      Extraocular Movements: Extraocular movements intact.      Conjunctiva/sclera: Conjunctivae normal.      Pupils: Pupils are equal, round, and reactive to light.   Neck:      Comments: Size 8 XLT tracheostomy tube in place and neck.  Cardiovascular:      Rate and Rhythm: Regular rhythm. Tachycardia present.      Pulses: Normal pulses.      Heart sounds: Normal heart sounds. No murmur heard.   No friction rub. No gallop.    Pulmonary:      Effort: Pulmonary effort is normal. No respiratory distress.      Breath sounds: No stridor. No wheezing or rales.      Comments: Diminished breath sound both lungs at the bases.  Abdominal:      General: Abdomen is flat. Bowel sounds are normal. There is no distension.      Palpations: There is mass.      Tenderness: There is no abdominal tenderness. There is no guarding.   Genitourinary:     Comments: Not examined  Musculoskeletal:         General: No swelling or deformity. Normal range of motion.      Cervical back: Normal range of motion and neck supple. No rigidity or tenderness.      Right lower leg: No edema.      Left lower leg: No edema.   Lymphadenopathy:      Cervical: No cervical adenopathy.   Skin:     General: Skin is warm.      Capillary Refill: Capillary refill takes less than 2 seconds.      Coloration: Skin is not jaundiced or pale.      Findings: No bruising.      Comments: Chronic skin changes noted in lower extremities.  He has a decubitus ulcer stage II on the buttock.   Neurological:      General: No focal deficit present.      Mental Status: He  is alert and oriented to person, place, and time. Mental status is at baseline.      Cranial Nerves: No cranial nerve deficit.      Sensory: No sensory deficit.      Motor: Weakness present.      Deep Tendon Reflexes: Reflexes normal.   Psychiatric:         Mood and Affect: Mood normal.         Behavior: Behavior normal.         Thought Content: Thought content normal.       Lab Data:      Results from last 7 days   Lab Units 21  1502 21  0353 21  1959   SODIUM mmol/L 149*  --   --    POTASSIUM mmol/L  --  3.3  --    MAGNESIUM mg/dL  --   --  2.3     Results from last 7 days   Lab Units 21  0309   PH, ARTERIAL  7.50*   PCO2, ARTERIAL mmHg 55*     No results found for: BLOODCX, URINECX, WOUNDCX, MRSACX, RESPCX, STOOLCX  No results found for: PROBNP  Recent radiology:   Imaging Results (Last 72 Hours)     ** No results found for the last 72 hours. **        My radiograph interpretation/independent review of imaging: Reviewed current reports and agree with interpretation reviewed outside hospital records.  Other test results (not lab or imaging): Reviewed  Independent review of ekg: Done.    Problem List as identified by Epic (may contain historical, inactive problems)  There is no problem list on file for this patient.    Pulmonary Assessment:  New problem (to me), with additional workup planned: Acute on chronic hypoxic respiratory failure, prolonged respiratory failure requiring tracheostomy placement secondary to COVID-19 pneumonia.  E. coli pneumonia, MSSA pneumonia, stenotrophomonas pneumonia on antibiotic treatment.  Morbid obesity possible sleep apnea  New problem (to me), no additional workup planned: Morbid obesity with possible obstructive sleep apnea  Other problems either stable, failing to improve or worsenin. Acute on chronic hypoxic respiratory failure  2. S/p mechanical ventilation and tracheostomy placement currently liberated from ventilator on tracheostomy  collar  3. Covid 19 pneumonia with prolonged respiratory failure and ARDS  4. Encephalopathy and agitated delirium improved  5. Small subarachnoid hemorrhage and intracranial hemorrhage stable  6. Conjunctival chemosis  7. Transaminitis  8. Morbid obesity with possible obstructive sleep apnea  9. Status post ECMO treatment  10. E. coli, stenotrophomonas and MSSA pneumonia and sepsis on antibiotic treatment  11. Anxiety and depression, PTSD  12. Allergic rhinitis  13. Hyperglycemia likely steroid-induced    Recommend/plan:   · Patient has a very complex medical history.  He presented with a COVID-19 pneumonia in the local hospital and later transferred to [facility].  He needed intubation and mechanical ventilation and eventually transferred to [facility] for ECMO.  His hospital stay was further complicated by sepsis encephalopathy pneumonia requiring prolonged antibiotic coverage and he still has low-grade fever.  · Continue on tracheostomy collar.  Routine tracheostomy care.  ENT has been consulted.  · Titrate FiO2 to maintain oxygen saturation more than 92%.  Routine bronchodilator treatment  · Encourage incentive spirometry and flutter valve to improve pulmonary compliance and clearance  · [place] valve will be used as needed.  · Continue blood pressure and glycemic control his insulin as needed.  · Plan for outpatient work-up for obstructive sleep apnea due to his obesity.  · Current plan is to downsize the tracheostomy and eventually decannulate him if remains stable.  · Further lab work tomorrow morning.  I ordered a blood gas tonight.  · We will follow the lab work and imaging studies.  PT/OT/nutritional support/speech therapy evaluation  · Continue current treatment and supportive care.  Continue wound care for decubitus ulcer.  · CODE STATUS: Full.  Overall prognosis: Guarded but he appears stable.  · Pulmonary team will continue following him and make further  recommendations.  · Total time spent in seeing the patient inpatient pulmonary consult was 45 minutes.    Thank you for this consult.  We will follow along.    Electronically signed by     Onofre Yee MD   Pulmonologist/intensivist   on 9/1/2021 at 21:46 CDT

## 2021-09-02 NOTE — PROGRESS NOTES
"Adult Nutrition  Assessment/PES    Patient Name:  Reggie Treviño  YOB: 1985  MRN: [id number]  Admit Date:  9/1/2021    Assessment Date:  9/2/2021    Comments:  NTN assessment completed. Will monitor PO, weight trend, and nutrition-related lab values per protocol.    Reason for Assessment     Row Name 09/02/21 1202          Reason for Assessment    Reason For Assessment  per organizational policy New Admission to LTACH     Diagnosis  pulmonary disease;infection/sepsis     Identified At Risk by Screening Criteria  no indicators present         Nutrition/Diet History     Row Name 09/02/21 1202          Nutrition/Diet History    Typical Food/Fluid Intake  Food preferences obtained. Pt excited to eat normal foods. Denied food allergies. Starting snacks twice daily.     Food Preferences  Coffee, juice, sweet tea, Sprite, grits or oats with cream and sugar, fruit (except mixed fruit), vegetables (except corn), no eggs.     Meal/Snack Patterns  AM and PM snack started.     Functional Status  able to prepare meals;able to purchase food     Food Allergies  -- Denied food allergies 9/2     Factors Affecting Nutritional Intake  -- Upgraded to regular/thins. Previously on dysphagia diets that were not appealing.         Anthropometrics     Row Name 09/02/21 1207 09/02/21 1206       Anthropometrics    Height  172.7 cm (68\")  172.7 cm (68\")       Ideal Body Weight (IBW)    Ideal Body Weight (IBW) (kg)  70.89  70.89    Row Name 09/02/21 1204          Anthropometrics    Height  172.7 cm (68\")     Weight  133 kg (294 lb)        Ideal Body Weight (IBW)    Ideal Body Weight (IBW) (kg)  70.89     % Ideal Body Weight  188.11        Body Mass Index (BMI)    BMI (kg/m2)  44.8     BMI Assessment  BMI 40 or greater: obesity grade III         Labs/Tests/Procedures/Meds     Row Name 09/02/21 1205          Labs/Procedures/Meds    Lab Results Reviewed  reviewed, pertinent     Lab Results Comments  Glu        Diagnostic " "Tests/Procedures    Diagnostic Test/Procedure Reviewed  reviewed, pertinent        Medications    Pertinent Medications Reviewed  reviewed, pertinent     Pertinent Medications Comments  See MAR         Physical Findings     Row Name 09/02/21 1205          Physical Findings    Overall Physical Appearance  obese     Gastrointestinal  -- BM 9/2     Skin  -- skin tear right clavicle; ulceration left thigh         Estimated/Assessed Needs     Row Name 09/02/21 1207 09/02/21 1206       Calculation Measurements    Weight Used For Calculations  70 kg (154 lb 5.2 oz)  70 kg (154 lb 5.2 oz) IBW    Height  172.7 cm (68\")  172.7 cm (68\")       Estimated/Assessed Needs    Additional Documentation  --  KCAL/KG (Group);Protein Requirements (Group);Fluid Requirements (Group)       KCAL/KG    KCAL/KG  --  30 Kcal/Kg (kcal);25 Kcal/Kg (kcal)    25 Kcal/Kg (kcal)  1750  1750    30 Kcal/Kg (kcal)  2100  2100       Protein Requirements    Weight Used For Protein Calculations  --  70 kg (154 lb 5.2 oz) IBW    Est Protein Requirement Amount (gms/kg)  --  1.5 gm protein    Estimated Protein Requirements (gms/day)  --  105       Fluid Requirements    Fluid Requirements (mL/day)  --  2100    Estimated Fluid Requirement Method  --  RDA Method    RDA Method (mL)  --  2100    Row Name 09/02/21 1204          Calculation Measurements    Height  172.7 cm (68\")         Nutrition Prescription Ordered     Row Name 09/02/21 1207          Nutrition Prescription PO    Current PO Diet  Regular     Fluid Consistency  Thin         Evaluation of Received Nutrient/Fluid Intake     Row Name 09/02/21 1207          Calculation Measurements    Weight Used For Calculations  70 kg (154 lb 5.2 oz)     Height  172.7 cm (68\")        PO Evaluation    Number of Days PO Intake Evaluated  Insufficient Data               Problem/Interventions:  Problem 1     Row Name 09/02/21 1207          Nutrition Diagnoses Problem 1    Problem 1  Increased Nutrient Needs     " Macronutrient  Protein     Etiology (related to)  Medical Diagnosis     Skin  Other (comment) Stage 2 to sacrum     Signs/Symptoms (evidenced by)  PO Intake;Report of Mnimal PO Intake;PRO;Other (comment) reported poor PO intake due to previous dysphagia diets; requested increased protein (likes meat); requires 1.5g pro/kg IBW for estimated needs for obesity class III.               Intervention Goal     Row Name 09/02/21 1211          Intervention Goal    General  Meet nutritional needs for age/condition;Disease management/therapy;Reduce/improve symptoms     PO  PO intake (%);Establish PO;Tolerate PO;Meet estimated needs     PO Intake %  75 %     Weight  Appropriate weight loss         Nutrition Intervention     Row Name 09/02/21 1211          Nutrition Intervention    RD/Tech Action  Advise available snack;Interview for preference;Menu provided;Encourage intake;Follow Tx progress;Care plan reviewd         Nutrition Prescription     Row Name 09/02/21 1211          Other Orders    Other  Regular diet and snacks twice daily.         Education/Evaluation     Row Name 09/02/21 1212          Education    Education  Education not appropriate at this time        Monitor/Evaluation    Monitor  Per protocol           Electronically signed by:  Juliet Gallardo RD  09/02/21 12:59 CDT

## 2021-09-03 PROBLEM — J96.01 ACUTE HYPOXEMIC RESPIRATORY FAILURE DUE TO COVID-19: Status: ACTIVE | Noted: 2021-09-03

## 2021-09-03 PROBLEM — G47.33 OSA (OBSTRUCTIVE SLEEP APNEA): Status: ACTIVE | Noted: 2021-09-03

## 2021-09-03 PROBLEM — E66.01 OBESITY, MORBID: Status: ACTIVE | Noted: 2021-09-03

## 2021-09-03 PROBLEM — U07.1 ACUTE HYPOXEMIC RESPIRATORY FAILURE DUE TO COVID-19: Status: ACTIVE | Noted: 2021-09-03

## 2021-09-03 PROBLEM — Z43.0 ACUTE TRACHEOSTOMY MANAGEMENT (HCC): Status: ACTIVE | Noted: 2021-09-03

## 2021-09-03 LAB
GLUCOSE BLDC GLUCOMTR-MCNC: 107 MG/DL (ref 70–130)
GLUCOSE BLDC GLUCOMTR-MCNC: 117 MG/DL (ref 70–130)
GLUCOSE BLDC GLUCOMTR-MCNC: 137 MG/DL (ref 70–130)
GLUCOSE BLDC GLUCOMTR-MCNC: 153 MG/DL (ref 70–130)

## 2021-09-03 PROCEDURE — 25010000002 ENOXAPARIN PER 10 MG: Performed by: INTERNAL MEDICINE

## 2021-09-03 PROCEDURE — 97535 SELF CARE MNGMENT TRAINING: CPT

## 2021-09-03 PROCEDURE — 82962 GLUCOSE BLOOD TEST: CPT

## 2021-09-03 PROCEDURE — 97116 GAIT TRAINING THERAPY: CPT

## 2021-09-03 PROCEDURE — 25010000002 CEFTAZIDIME PER 500 MG: Performed by: INTERNAL MEDICINE

## 2021-09-03 PROCEDURE — 97110 THERAPEUTIC EXERCISES: CPT

## 2021-09-03 PROCEDURE — 63710000001 INSULIN DETEMIR PER 5 UNITS: Performed by: INTERNAL MEDICINE

## 2021-09-03 PROCEDURE — 63710000001 INSULIN LISPRO (HUMAN) PER 5 UNITS: Performed by: INTERNAL MEDICINE

## 2021-09-03 PROCEDURE — 99254 IP/OBS CNSLTJ NEW/EST MOD 60: CPT | Performed by: OTOLARYNGOLOGY

## 2021-09-03 RX ORDER — DEXTROMETHORPHAN POLISTIREX 30 MG/5ML
60 SUSPENSION ORAL EVERY 12 HOURS PRN
Status: DISCONTINUED | OUTPATIENT
Start: 2021-09-03 | End: 2021-09-23 | Stop reason: HOSPADM

## 2021-09-03 NOTE — CONSULTS
[facility] Otolaryngology Head and Neck Surgery  CONSULT  Patient Name: Reggie Treviño  : 1985  MRN: [id number]  Primary Care Physician:  Terrell Navas DO  Referring Physician: Shahab Garcia MD  Date of admission: 2021  Length of Stay: 0  Room: [facility]      Subjective    Subjective   History of Present Illness  Chief Complaint/Reason for Consultation: Trach management  Accompanied by: RT and PT  Reggie Treviño is a 36 y.o. male has been transferred to LT for trach weaning.  Patient has a significant history of PTSD and obesity.  He was diagnosed with Covid on 2021.  He was admitted to [facility] on 2021 for us of breath and hypoxia.  He was intubated on 2021.  He was transferred to [facility] on the same day.  Because of respiratory failure, he was placed on ECMO and transferred to [facility].  He had a prolonged stay at [facility].  Patient was treated for Covid there.  He had prolonged ARDS and difficulty in weaning from ventilation.  He eventually had tracheostomy placed 2021  Patient was eventually liberated from ventilator there.  He is transferred to LT for continued tracheostomy management, treatment for peripheral neuropathy and polymyopathy.  Apparently there is some indication that he had interventricular hemorrhage as well.    Review of Systems  Constitutional: Positive for malaise/fatigue. Negative for chills, decreased appetite, weight gain and weight loss.   HENT: Negative for congestion, ear discharge, hoarse voice and tinnitus.    Eyes: Negative for blurred vision, discharge, visual disturbance and visual halos.   Cardiovascular: Negative for chest pain, claudication, dyspnea on exertion, irregular heartbeat, leg swelling, orthopnea and paroxysmal nocturnal dyspnea.   Respiratory: Negative for cough, shortness of breath, sputum production and wheezing prior to admission    Endocrine: Negative for cold intolerance, heat intolerance and polyuria.   Hematologic/Lymphatic: Negative for adenopathy. Does not bruise/bleed easily.   Skin: Negative for dry skin, itching and suspicious lesions.   Musculoskeletal: Negative for arthritis, back pain, falls, joint pain, muscle weakness and myalgias.   Gastrointestinal: Negative for abdominal pain, constipation, diarrhea, dysphagia and hematemesis.   Genitourinary: Negative for bladder incontinence, dysuria and frequency.   Neurological: Positive for weakness. Negative for aphonia, disturbances in coordination and dizziness.   Psychiatric/Behavioral: Negative for altered mental status,  positive for PTSD      Past Medical History:   Past Medical History:   Diagnosis Date   • Kidney stone      Past Surgical History:No past surgical history on file.    Family History: His family history is not on file.   Social History: He  has no history on file for tobacco use, alcohol use, and drug use.    Home Medications:       Allergies:  He is allergic to sulfa antibiotics.    Objective    Objective   Vitals:         Physical Exam  Constitutional:       General: He is not in acute distress.     Appearance: He is well-developed. He is morbidly obese.      Interventions: He is intubated.      Comments: Sitting in wheelchair, trach in position with Passy-Gonzalez, able to speak   HENT:      Head: Normocephalic and atraumatic.      Right Ear: Hearing and external ear normal.      Left Ear: Hearing and external ear normal.      Nose: Nose normal.      Mouth/Throat:      Mouth: Mucous membranes are dry.      Dentition: Normal dentition. No gum lesions.      Tongue: No lesions. Tongue does not deviate from midline.      Palate: No mass and lesions.      Comments: Macroglossia-severe  Prolapse-severe  OP exam deferred  Eyes:      General: Lids are normal. Gaze aligned appropriately.      Extraocular Movements: Extraocular movements intact.      Conjunctiva/sclera:       Right eye: Hemorrhage present.      Left eye: Hemorrhage present.      Comments: Mild inferior subconjunctival hemorrhage bilaterally   Neck:      Comments: Very short, very thick  Larynx low in neck    TRACHEOSTOMY SITE:    Tracheostomy tube type: Shiley #6 cuffed DIC XLT P    Stoma: Healing well    Passey-Gonzalez valve: Tolerating well    Voice: Good with Passy-Gonzalez in position, somewhat breathy  Date placed: 7/30/2021  Date last changed: Unknown  Pulmonary:      Effort: No tachypnea, prolonged expiration or respiratory distress. He is intubated.      Breath sounds: No stridor.      Comments: Tracheostomy tube in position, Passy-Gonzalez valve in position  Musculoskeletal:      Cervical back: Decreased range of motion.   Neurological:      Mental Status: He is alert and oriented to person, place, and time.      Cranial Nerves: Cranial nerves are intact.   Psychiatric:         Attention and Perception: Attention and perception normal.         Mood and Affect: Mood and affect normal.         Behavior: Behavior normal. Behavior is cooperative.         Cognition and Memory: Cognition normal.         Result Review    Result Review:  I have personally reviewed the results from the time of this admission to 9/3/2021 15:54 CDT and agree with these findings:  [x]  Laboratory  []  Microbiology  [x]  Radiology  []  EKG/Telemetry   []  Cardiology/Vascular   []  Pathology  [x]  Old records  []  Other:  Most notable findings include: Anemia, elevated white count, chest x-ray reviewed and agree with radiology findings.  Progress Notes by Onofre Yee MD (09/02/2021 11:46)   Progress Notes by Debi Choi APRN (09/03/2021 10:50)   Blood Gas, Arterial - (09/01/2021 23:08)   Comprehensive Metabolic Panel (09/02/2021 04:32)   CBC (No Diff) (09/02/2021 04:32)   XR Chest 1 View (09/01/2021 22:21)       Assessment/Plan    Assessment / Plan   Brief Patient Summary:  Reggie Treviño is a 36 y.o. male who is continue with  tracheostomy tube since transfer to LTAC.  He apparently is progressing well since being placed on ECMO and then weaned.  He has been weaned from the ventilator as well.  He is speaking well with the Passy-Gonzalez valve in position.  Patient may need trach weaning and possible evaluation for sleep apnea.    Active Hospital Problems:  Active Hospital Problems    Diagnosis    • Acute tracheostomy management (CMS/[facility])    • Acute hypoxemic respiratory failure due to COVID-19 (CMS/[facility])    • Obesity, morbid (CMS/[facility])    • TODD (obstructive sleep apnea)            Plan:   • Trach management-patient has a current stable trach in position.  We will continue current trach management.  I will work towards decannulation soon.  • Sleep apnea-patient appears to have suspected sleep apnea.  I will discuss with the patient possible treatment of sleep apnea with tracheostomy tube.  • Respiratory failure-the patient is doing well since being weaned from the ventilator.  He has been followed by pulmonary.  • Covid infection-primary team, the patient is recovering    I discussed the patient's findings and my recommendations with RT.  Following patient as in-patient. Further recommendations will be made based on serial examinations.    Medications/Orders for this encounter: No new medications ordered.  No New orders written.        DVT prophylaxis:  Medical DVT prophylaxis orders are present.    Discharge Planning: Per primary team    Electronically signed by Davin David Jr, MD, 09/03/21, 3:35 PM CDT.

## 2021-09-03 NOTE — PROGRESS NOTES
BERNARDINO Erazo APRN        Internal Medicine Progress Note    9/3/2021   11:17 CDT    Name:  Reggie Treviño  MRN:    [id number]     Acct:     [id number]   Room:  [address] [place] [id number] Day: 0     Admit Date: 9/1/2021  8:21 PM  PCP: Terrell Navas DO    Subjective:     C/C: weakness, cough    Interval History: Status:  stayed the same. Up to chair. No family at bedside. Low grade temp. Largely independent for ADL. Pain well controlled. Tolerating po diet. Tolerating trach collar. Reports that he had a dry cough overnight interfering with sleep.     Review of Systems   Constitutional: Positive for malaise/fatigue. Negative for chills, decreased appetite, weight gain and weight loss.   HENT: Negative for congestion, ear discharge, hoarse voice and tinnitus.    Eyes: Negative for blurred vision, discharge, visual disturbance and visual halos.   Cardiovascular: Negative for chest pain, claudication, dyspnea on exertion, irregular heartbeat, leg swelling, orthopnea and paroxysmal nocturnal dyspnea.   Respiratory: Positive for cough. Negative for shortness of breath, sputum production and wheezing.    Endocrine: Negative for cold intolerance, heat intolerance and polyuria.   Hematologic/Lymphatic: Negative for adenopathy. Does not bruise/bleed easily.   Skin: Negative for dry skin, itching and suspicious lesions.   Musculoskeletal: Negative for arthritis, back pain, falls, joint pain, muscle weakness and myalgias.   Gastrointestinal: Negative for abdominal pain, constipation, diarrhea, dysphagia and hematemesis.   Genitourinary: Negative for bladder incontinence, dysuria and frequency.   Neurological: Positive for weakness. Negative for aphonia, disturbances in coordination and dizziness.   Psychiatric/Behavioral: Negative for altered mental status, depression, memory loss and substance abuse. The patient does not have insomnia and is not nervous/anxious.          Medications:      Allergies:   Allergies   Allergen Reactions   • Sulfa Antibiotics Rash       Current Meds:   Current Facility-Administered Medications:   •  acetaminophen (TYLENOL) 160 MG/5ML solution 500 mg, 500 mg, Oral, Q8H, Shahab Garcia MD  •  acetaminophen (TYLENOL) 160 MG/5ML solution 650 mg, 650 mg, Oral, Q4H PRN **OR** acetaminophen (TYLENOL) suppository 650 mg, 650 mg, Rectal, Q4H PRN, Shahab Garcia MD  •  albuterol sulfate HFA (PROVENTIL HFA;VENTOLIN HFA;PROAIR HFA) inhaler 6 puff, 6 puff, Inhalation, Q4H PRN, Shahab Garcia MD  •  artificial tears (REFRESH LACRI-LUBE) ophthalmic ointment, , Both Eyes, 4x Daily, Shahab Garcia MD  •  cefTAZidime (FORTAZ) 2 g in sodium chloride 0.9 % 100 mL IVPB, 2 g, Intravenous, Q8H, Shahab Garcia MD  •  chlorhexidine (PERIDEX) 0.12 % solution 15 mL, 15 mL, Mouth/Throat, Q12H, Shahab Garcia MD  •  dextrose (D50W) 25 g/ 50mL Intravenous Solution 25 g, 25 g, Intravenous, Q15 Min PRN, Shahab Garcia MD  •  dextrose (GLUTOSE) oral gel 15 g, 15 g, Oral, Q15 Min PRN, Shahab Garcia MD  •  docusate (COLACE) 50 MG/5ML liquid 150 mg, 150 mg, Oral, BID, Shahab Garcia MD  •  enoxaparin (LOVENOX) syringe 40 mg, 40 mg, Subcutaneous, Q12H, Shahab Garcia MD  •  famotidine (PEPCID) 40 MG/5ML suspension 20 mg, 20 mg, Oral, Q12H, Shahab Garcia MD  •  glucagon (human recombinant) (GLUCAGEN DIAGNOSTIC) injection 1 mg, 1 mg, Subcutaneous, Q15 Min PRN, Shahab Garcia MD  •  hypromellose (ISOPTO TEARS) 0.5 % ophthalmic solution 2 drop, 2 drop, Both Eyes, Q2H PRN, Shahab Garcia MD  •  insulin detemir (LEVEMIR) injection 5 Units, 5 Units, Subcutaneous, Q12H, Shahab Garcia MD  •  insulin lispro (humaLOG) injection 0-14 Units, 0-14 Units, Subcutaneous, 4x Daily AC & at Bedtime, Shahab Garcia MD  •  ipratropium-albuterol (DUO-NEB) nebulizer solution 3 mL, 3 mL, Nebulization, Q6H -  "RT, SensarmaOnofre MD  •  loperamide (IMODIUM) capsule 2 mg, 2 mg, Oral, 4x Daily PRN, Shahab Garcia MD  •  ondansetron (ZOFRAN) injection 4 mg, 4 mg, Intravenous, Q6H PRN **OR** ondansetron (ZOFRAN) tablet 4 mg, 4 mg, Oral, Q6H PRN, Shahab Garcia MD  •  polyethylene glycol (MIRALAX) packet 17 g, 17 g, Oral, Q12H, Shahab Garcia MD  •  saccharomyces boulardii (FLORASTOR) capsule 250 mg, 250 mg, Oral, BID, Shahab Garcia MD  •  sodium chloride 0.9 % flush 10 mL, 10 mL, Intravenous, Q12H, Shahab Garcia MD  •  sodium chloride 0.9 % flush 10 mL, 10 mL, Intravenous, PRN, Shahab Garcia MD  •  traZODone (DESYREL) tablet 25 mg, 25 mg, Oral, Nightly PRN, Shahab Garcia MD    Data:     Code Status:    There are no questions and answers to display.       No family history on file.    Social History     Socioeconomic History   • Marital status:      Spouse name: Not on file   • Number of children: Not on file   • Years of education: Not on file   • Highest education level: Not on file   Tobacco Use   • Smoking status: Unknown If Ever Smoked       Vitals:  Ht 172.7 cm (68\")   Wt 133 kg (294 lb)   BMI 44.70 kg/m²     T 99.2 P 84 R 22 /90 Sp02 100% (trach collar)        I/O (24Hr):  No intake or output data in the 24 hours ending 09/03/21 1117    Labs and imaging:      Recent Results (from the past 12 hour(s))   POC Glucose Once    Collection Time: 09/03/21  8:34 AM    Specimen: Blood   Result Value Ref Range    Glucose 117 70 - 130 mg/dL           Physical Examination:        Physical Exam  Vitals and nursing note reviewed.   Constitutional:       Appearance: He is well-developed.   HENT:      Head: Normocephalic and atraumatic.      Nose: Nose normal.   Eyes:      Pupils: Pupils are equal, round, and reactive to light.   Neck:      Comments: Trach to collar  Cardiovascular:      Rate and Rhythm: Normal rate and regular rhythm.      Heart sounds: Normal " heart sounds.   Pulmonary:      Effort: Pulmonary effort is normal.      Breath sounds: Normal breath sounds.   Abdominal:      General: Bowel sounds are normal.      Palpations: Abdomen is soft.      Comments: obese   Musculoskeletal:         General: Normal range of motion.      Cervical back: Normal range of motion and neck supple.      Comments: Generalized weakness   Skin:     General: Skin is warm and dry.   Neurological:      Mental Status: He is alert and oriented to person, place, and time.      Deep Tendon Reflexes: Reflexes are normal and symmetric.   Psychiatric:         Behavior: Behavior normal.           Assessment:             * No active hospital problems. *    Past Medical History:   Diagnosis Date   • Kidney stone         Plan:        1. Acute hypoxic respiratory failure secondary to COVID 19  2. Dysphagia  3. Bilateral chemosis  4. Stenotrophomonas pneumonia  5. Morbid obesity  6. PTSD  7. Hyperglycemia    Continue current treatment. Monitor counts. Increase activity. Labs Tuesday. Add deslym to help with cough. Aggressive therapies as tolerated. Oxygen weaning as tolerated. Glycemic control.       Electronically signed by SRIKANTH Fulton on 9/3/2021 at 11:17 CDT     I have discussed the care of Reggie Treviño, including pertinent history and exam findings, with the nurse practitioner.    I have seen and examined the patient and the key elements of all parts of the encounter have been performed by me.  I agree with the assessment, plan and orders as documented by SRIKANTH Delgado, after I modified the exam findings and the plan of treatments and the final version is my approved version of the assessment.        Electronically signed by Shahab Garcia MD on 9/3/2021 at 23:37 CDT

## 2021-09-04 LAB
GLUCOSE BLDC GLUCOMTR-MCNC: 102 MG/DL (ref 70–130)
GLUCOSE BLDC GLUCOMTR-MCNC: 103 MG/DL (ref 70–130)
GLUCOSE BLDC GLUCOMTR-MCNC: 107 MG/DL (ref 70–130)
GLUCOSE BLDC GLUCOMTR-MCNC: 126 MG/DL (ref 70–130)

## 2021-09-04 PROCEDURE — 82962 GLUCOSE BLOOD TEST: CPT

## 2021-09-04 PROCEDURE — 25010000002 CEFTAZIDIME PER 500 MG: Performed by: INTERNAL MEDICINE

## 2021-09-04 PROCEDURE — 25010000002 ENOXAPARIN PER 10 MG: Performed by: INTERNAL MEDICINE

## 2021-09-04 PROCEDURE — 97530 THERAPEUTIC ACTIVITIES: CPT

## 2021-09-04 PROCEDURE — 63710000001 INSULIN DETEMIR PER 5 UNITS: Performed by: INTERNAL MEDICINE

## 2021-09-04 NOTE — PROGRESS NOTES
Jose Garcia M.D.  SRIKANTH Delgado        Internal Medicine Progress Note    9/4/2021   08:04 CDT    Name:  Reggie Treviño  MRN:    [id number]     Acct:     [id number]   Room:  [address] [place] [address] Day: 0     Admit Date: 9/1/2021  8:21 PM  PCP: Terrell Navas DO    Subjective:     C/C: weakness, cough    Interval History: Status:  stayed the same. Up to chair. No family at bedside. Low grade temp. Largely independent for ADL. Pain well controlled. Tolerating po diet. Tolerating trach collar. Continues with cough, feels as if there is a constant tickle in the back of his throat.  He is concerned it is the trach size causing the tickle. Speaking well with valve in place. Is anxious to dc lovenox due to forceful cough causing busted vessels in eyes.      Review of Systems   Constitutional: Positive for malaise/fatigue. Negative for chills, decreased appetite, weight gain and weight loss.   HENT: Negative for congestion, ear discharge, hoarse voice and tinnitus.    Eyes: Negative for blurred vision, discharge, visual disturbance and visual halos.   Cardiovascular: Negative for chest pain, claudication, dyspnea on exertion, irregular heartbeat, leg swelling, orthopnea and paroxysmal nocturnal dyspnea.   Respiratory: Positive for cough. Negative for shortness of breath, sputum production and wheezing.    Endocrine: Negative for cold intolerance, heat intolerance and polyuria.   Hematologic/Lymphatic: Negative for adenopathy. Does not bruise/bleed easily.   Skin: Negative for dry skin, itching and suspicious lesions.   Musculoskeletal: Negative for arthritis, back pain, falls, joint pain, muscle weakness and myalgias.   Gastrointestinal: Negative for abdominal pain, constipation, diarrhea, dysphagia and hematemesis.   Genitourinary: Negative for bladder incontinence, dysuria and frequency.   Neurological: Positive for weakness. Negative for aphonia, disturbances in coordination and dizziness.    Psychiatric/Behavioral: Negative for altered mental status, depression, memory loss and substance abuse. The patient does not have insomnia and is not nervous/anxious.          Medications:     Allergies:   Allergies   Allergen Reactions   • Sulfa Antibiotics Rash       Current Meds:   Current Facility-Administered Medications:   •  acetaminophen (TYLENOL) 160 MG/5ML solution 500 mg, 500 mg, Oral, Q8H, Shahab Garcia MD  •  acetaminophen (TYLENOL) 160 MG/5ML solution 650 mg, 650 mg, Oral, Q4H PRN **OR** acetaminophen (TYLENOL) suppository 650 mg, 650 mg, Rectal, Q4H PRN, Shahab Garcia MD  •  albuterol sulfate HFA (PROVENTIL HFA;VENTOLIN HFA;PROAIR HFA) inhaler 6 puff, 6 puff, Inhalation, Q4H PRN, Shahab Garcia MD  •  artificial tears (REFRESH LACRI-LUBE) ophthalmic ointment, , Both Eyes, 4x Daily, Shahab Garcia MD  •  cefTAZidime (FORTAZ) 2 g/100 mL 0.9% NS IVPB (mpb), 2 g, Intravenous, Q8H, Shahab Garcia MD  •  chlorhexidine (PERIDEX) 0.12 % solution 15 mL, 15 mL, Mouth/Throat, Q12H, Shahab Garcia MD  •  dextromethorphan polistirex ER (DELSYM) 30 MG/5ML oral suspension 60 mg, 60 mg, Oral, Q12H PRN, Debi Choi APRN  •  dextrose (D50W) 25 g/ 50mL Intravenous Solution 25 g, 25 g, Intravenous, Q15 Min PRN, Shahab Garcia MD  •  dextrose (GLUTOSE) oral gel 15 g, 15 g, Oral, Q15 Min PRN, Shahab Garcia MD  •  docusate (COLACE) 50 MG/5ML liquid 150 mg, 150 mg, Oral, BID, Shahab Garcia MD  •  enoxaparin (LOVENOX) syringe 40 mg, 40 mg, Subcutaneous, Q12H, Shahab Garcia MD  •  famotidine (PEPCID) 40 MG/5ML suspension 20 mg, 20 mg, Oral, Q12H, Shahab Garcia MD  •  glucagon (human recombinant) (GLUCAGEN DIAGNOSTIC) injection 1 mg, 1 mg, Subcutaneous, Q15 Min PRN, Shahab Garcia MD  •  hypromellose (ISOPTO TEARS) 0.5 % ophthalmic solution 2 drop, 2 drop, Both Eyes, Q2H PRN, Shahab Garcia MD  •  insulin  "detemir (LEVEMIR) injection 5 Units, 5 Units, Subcutaneous, Q12H, Shahab Garcia MD  •  insulin lispro (humaLOG) injection 0-14 Units, 0-14 Units, Subcutaneous, 4x Daily AC & at Bedtime, Shahab Garcia MD  •  ipratropium-albuterol (DUO-NEB) nebulizer solution 3 mL, 3 mL, Nebulization, Q6H - RT, Onofre Yee MD  •  loperamide (IMODIUM) capsule 2 mg, 2 mg, Oral, 4x Daily PRN, Shahab Garcia MD  •  ondansetron (ZOFRAN) injection 4 mg, 4 mg, Intravenous, Q6H PRN **OR** ondansetron (ZOFRAN) tablet 4 mg, 4 mg, Oral, Q6H PRN, Shahab Garcia MD  •  polyethylene glycol (MIRALAX) packet 17 g, 17 g, Oral, Q12H, Shahab Garcia MD  •  saccharomyces boulardii (FLORASTOR) capsule 250 mg, 250 mg, Oral, BID, Shahab Garcia MD  •  sodium chloride 0.9 % flush 10 mL, 10 mL, Intravenous, Q12H, Shahab Garcia MD  •  sodium chloride 0.9 % flush 10 mL, 10 mL, Intravenous, PRN, Shahab Garcia MD  •  traZODone (DESYREL) tablet 25 mg, 25 mg, Oral, Nightly PRN, Shahab Garcia MD    Data:     Code Status:    There are no questions and answers to display.       No family history on file.    Social History     Socioeconomic History   • Marital status:      Spouse name: Not on file   • Number of children: Not on file   • Years of education: Not on file   • Highest education level: Not on file   Tobacco Use   • Smoking status: Unknown If Ever Smoked       Vitals:  Ht 172.7 cm (68\")   Wt 133 kg (294 lb)   BMI 44.70 kg/m²     T 99.0 P 86 R 32 /64 Sp02 98% (trach collar)        I/O (24Hr):  No intake or output data in the 24 hours ending 09/04/21 0804    Labs and imaging:      No results found for this or any previous visit (from the past 12 hour(s)).        Physical Examination:        Physical Exam  Vitals and nursing note reviewed.   Constitutional:       Appearance: He is well-developed.   HENT:      Head: Normocephalic and atraumatic.      Nose: Nose " normal.   Eyes:      Pupils: Pupils are equal, round, and reactive to light.   Neck:      Comments: Trach to collar  Cardiovascular:      Rate and Rhythm: Normal rate and regular rhythm.      Heart sounds: Normal heart sounds.   Pulmonary:      Effort: Pulmonary effort is normal.      Breath sounds: Normal breath sounds.   Abdominal:      General: Bowel sounds are normal.      Palpations: Abdomen is soft.      Comments: obese   Musculoskeletal:         General: Normal range of motion.      Cervical back: Normal range of motion and neck supple.      Comments: Generalized weakness   Skin:     General: Skin is warm and dry.   Neurological:      Mental Status: He is alert and oriented to person, place, and time.      Deep Tendon Reflexes: Reflexes are normal and symmetric.   Psychiatric:         Behavior: Behavior normal.           Assessment:             Acute tracheostomy management (CMS/[facility])    Acute hypoxemic respiratory failure due to COVID-19 (CMS/[facility])    Obesity, morbid (CMS/[facility])    TODD (obstructive sleep apnea)    Past Medical History:   Diagnosis Date   • Kidney stone         Plan:        1. Acute hypoxic respiratory failure secondary to COVID 19  2. Dysphagia  3. Bilateral chemosis  4. Stenotrophomonas pneumonia  5. Morbid obesity  6. PTSD  7. Hyperglycemia    Continue current treatment. Monitor counts. Increase activity. Labs Tuesday. Add deslym to help with cough. Aggressive therapies as tolerated. Oxygen weaning as tolerated. Glycemic control.       Electronically signed by SRIKANTH Garcia on 9/4/2021 at 08:04 CDT

## 2021-09-05 LAB
GLUCOSE BLDC GLUCOMTR-MCNC: 102 MG/DL (ref 70–130)
GLUCOSE BLDC GLUCOMTR-MCNC: 105 MG/DL (ref 70–130)
GLUCOSE BLDC GLUCOMTR-MCNC: 108 MG/DL (ref 70–130)
GLUCOSE BLDC GLUCOMTR-MCNC: 138 MG/DL (ref 70–130)

## 2021-09-05 PROCEDURE — 97110 THERAPEUTIC EXERCISES: CPT

## 2021-09-05 PROCEDURE — 82962 GLUCOSE BLOOD TEST: CPT

## 2021-09-05 PROCEDURE — 63710000001 INSULIN DETEMIR PER 5 UNITS: Performed by: INTERNAL MEDICINE

## 2021-09-05 PROCEDURE — 97116 GAIT TRAINING THERAPY: CPT

## 2021-09-05 PROCEDURE — 25010000002 CEFTAZIDIME PER 500 MG: Performed by: INTERNAL MEDICINE

## 2021-09-05 RX ORDER — ACETAMINOPHEN 500 MG
500 TABLET ORAL EVERY 8 HOURS
Status: DISCONTINUED | OUTPATIENT
Start: 2021-09-05 | End: 2021-09-12

## 2021-09-05 RX ORDER — ACETAMINOPHEN 325 MG/1
650 TABLET ORAL EVERY 8 HOURS
Status: DISCONTINUED | OUTPATIENT
Start: 2021-09-05 | End: 2021-09-05

## 2021-09-05 RX ORDER — ACETAMINOPHEN 325 MG/1
650 TABLET ORAL EVERY 8 HOURS PRN
Status: DISCONTINUED | OUTPATIENT
Start: 2021-09-05 | End: 2021-09-05

## 2021-09-05 RX ORDER — ZOLPIDEM TARTRATE 5 MG/1
5 TABLET ORAL NIGHTLY PRN
Status: DISPENSED | OUTPATIENT
Start: 2021-09-05 | End: 2021-09-12

## 2021-09-05 RX ORDER — ACETAMINOPHEN 650 MG/1
650 SUPPOSITORY RECTAL EVERY 4 HOURS PRN
Status: DISCONTINUED | OUTPATIENT
Start: 2021-09-05 | End: 2021-09-05

## 2021-09-05 NOTE — PROGRESS NOTES
BERNARDINO Erazo APRN        Internal Medicine Progress Note    9/5/2021   09:23 CDT    Name:  Reggie Treviño  MRN:    [id number]     Acct:     [id number]   Room:  [address] [place] [address] Day: 0     Admit Date: 9/1/2021  8:21 PM  PCP: Terrell Navas DO    Subjective:     C/C: weakness, cough    Interval History: Status:  stayed the same. Resting in bed. No family at bedside. Afebrile. OK to dc Lovenox per Dr. Garcia.  Pt complains about not sleeping well.  Pain controlled.  Tolerating trach collar and speaking valve.    Review of Systems   Constitutional: Positive for malaise/fatigue. Negative for chills, decreased appetite, weight gain and weight loss.   HENT: Negative for congestion, ear discharge, hoarse voice and tinnitus.    Eyes: Negative for blurred vision, discharge, visual disturbance and visual halos.   Cardiovascular: Negative for chest pain, claudication, dyspnea on exertion, irregular heartbeat, leg swelling, orthopnea and paroxysmal nocturnal dyspnea.   Respiratory: Positive for cough. Negative for shortness of breath, sputum production and wheezing.    Endocrine: Negative for cold intolerance, heat intolerance and polyuria.   Hematologic/Lymphatic: Negative for adenopathy. Does not bruise/bleed easily.   Skin: Negative for dry skin, itching and suspicious lesions.   Musculoskeletal: Negative for arthritis, back pain, falls, joint pain, muscle weakness and myalgias.   Gastrointestinal: Negative for abdominal pain, constipation, diarrhea, dysphagia and hematemesis.   Genitourinary: Negative for bladder incontinence, dysuria and frequency.   Neurological: Positive for weakness. Negative for aphonia, disturbances in coordination and dizziness.   Psychiatric/Behavioral: Negative for altered mental status, depression, memory loss and substance abuse. The patient does not have insomnia and is not nervous/anxious.          Medications:     Allergies:   Allergies   Allergen  Reactions   • Sulfa Antibiotics Rash       Current Meds:   Current Facility-Administered Medications:   •  acetaminophen (TYLENOL) 160 MG/5ML solution 500 mg, 500 mg, Oral, Q8H, Shahab Garcia MD  •  acetaminophen (TYLENOL) 160 MG/5ML solution 650 mg, 650 mg, Oral, Q4H PRN **OR** acetaminophen (TYLENOL) suppository 650 mg, 650 mg, Rectal, Q4H PRN, Shahab Garcia MD  •  albuterol sulfate HFA (PROVENTIL HFA;VENTOLIN HFA;PROAIR HFA) inhaler 6 puff, 6 puff, Inhalation, Q4H PRN, Shahab Garcia MD  •  artificial tears (REFRESH LACRI-LUBE) ophthalmic ointment, , Both Eyes, 4x Daily, Shahab Garcia MD  •  cefTAZidime (FORTAZ) 2 g/100 mL 0.9% NS IVPB (mpb), 2 g, Intravenous, Q8H, Shahab Garcia MD  •  chlorhexidine (PERIDEX) 0.12 % solution 15 mL, 15 mL, Mouth/Throat, Q12H, Shahab Garcia MD  •  dextromethorphan polistirex ER (DELSYM) 30 MG/5ML oral suspension 60 mg, 60 mg, Oral, Q12H PRN, Debi Choi APRN  •  dextrose (D50W) 25 g/ 50mL Intravenous Solution 25 g, 25 g, Intravenous, Q15 Min PRN, Shahab Garcia MD  •  dextrose (GLUTOSE) oral gel 15 g, 15 g, Oral, Q15 Min PRN, Shahab Garcia MD  •  docusate (COLACE) 50 MG/5ML liquid 150 mg, 150 mg, Oral, BID, Shahab Garcia MD  •  enoxaparin (LOVENOX) syringe 40 mg, 40 mg, Subcutaneous, Q12H, Shahab Garcia MD  •  famotidine (PEPCID) 40 MG/5ML suspension 20 mg, 20 mg, Oral, Q12H, Shahab Garcia MD  •  glucagon (human recombinant) (GLUCAGEN DIAGNOSTIC) injection 1 mg, 1 mg, Subcutaneous, Q15 Min PRN, Shahab Garcia MD  •  hypromellose (ISOPTO TEARS) 0.5 % ophthalmic solution 2 drop, 2 drop, Both Eyes, Q2H PRN, Shahab Garcia MD  •  insulin detemir (LEVEMIR) injection 5 Units, 5 Units, Subcutaneous, Q12H, Shahab Garcia MD  •  insulin lispro (humaLOG) injection 0-14 Units, 0-14 Units, Subcutaneous, 4x Daily AC & at Bedtime, Shahab Garcia MD  •   "ipratropium-albuterol (DUO-NEB) nebulizer solution 3 mL, 3 mL, Nebulization, Q6H - RT, Onofre Yee MD  •  loperamide (IMODIUM) capsule 2 mg, 2 mg, Oral, 4x Daily PRN, Shahab Garcia MD  •  ondansetron (ZOFRAN) injection 4 mg, 4 mg, Intravenous, Q6H PRN **OR** ondansetron (ZOFRAN) tablet 4 mg, 4 mg, Oral, Q6H PRN, Shahab Garcia MD  •  polyethylene glycol (MIRALAX) packet 17 g, 17 g, Oral, Q12H, Shahab Garcia MD  •  saccharomyces boulardii (FLORASTOR) capsule 250 mg, 250 mg, Oral, BID, Shahab Garcia MD  •  sodium chloride 0.9 % flush 10 mL, 10 mL, Intravenous, Q12H, Shahab Garcia MD  •  sodium chloride 0.9 % flush 10 mL, 10 mL, Intravenous, PRN, Shahab Garcia MD  •  traZODone (DESYREL) tablet 25 mg, 25 mg, Oral, Nightly PRN, Shahab Garcia MD    Data:     Code Status:    There are no questions and answers to display.       No family history on file.    Social History     Socioeconomic History   • Marital status:      Spouse name: Not on file   • Number of children: Not on file   • Years of education: Not on file   • Highest education level: Not on file   Tobacco Use   • Smoking status: Unknown If Ever Smoked       Vitals:  Ht 172.7 cm (68\")   Wt 133 kg (294 lb)   BMI 44.70 kg/m²     T 98.5 P 100 R 32 /72 Sp02 98% (trach collar)        I/O (24Hr):  No intake or output data in the 24 hours ending 09/05/21 0923    Labs and imaging:      Recent Results (from the past 12 hour(s))   POC Glucose Once    Collection Time: 09/05/21  7:16 AM    Specimen: Blood   Result Value Ref Range    Glucose 105 70 - 130 mg/dL           Physical Examination:        Physical Exam  Vitals and nursing note reviewed.   Constitutional:       Appearance: He is well-developed.   HENT:      Head: Normocephalic and atraumatic.      Nose: Nose normal.   Eyes:      Pupils: Pupils are equal, round, and reactive to light.   Neck:      Comments: Trach to " collar  Cardiovascular:      Rate and Rhythm: Normal rate and regular rhythm.      Heart sounds: Normal heart sounds.   Pulmonary:      Effort: Pulmonary effort is normal.      Breath sounds: Normal breath sounds.   Abdominal:      General: Bowel sounds are normal.      Palpations: Abdomen is soft.      Comments: obese   Musculoskeletal:         General: Normal range of motion.      Cervical back: Normal range of motion and neck supple.      Comments: Generalized weakness   Skin:     General: Skin is warm and dry.   Neurological:      Mental Status: He is alert and oriented to person, place, and time.      Deep Tendon Reflexes: Reflexes are normal and symmetric.   Psychiatric:         Behavior: Behavior normal.           Assessment:             Acute tracheostomy management (CMS/[facility])    Acute hypoxemic respiratory failure due to COVID-19 (CMS/[facility])    Obesity, morbid (CMS/[facility])    TODD (obstructive sleep apnea)    Past Medical History:   Diagnosis Date   • Kidney stone         Plan:        1. Acute hypoxic respiratory failure secondary to COVID 19  2. Dysphagia  3. Bilateral chemosis  4. Stenotrophomonas pneumonia  5. Morbid obesity  6. PTSD  7. Hyperglycemia    Continue current treatment. Monitor counts. Increase activity. Labs Tuesday. Add deslym to help with cough. Aggressive therapies as tolerated. Oxygen weaning as tolerated. Glycemic control. DC Lovenox.  Add Ambien prn sleep.       Electronically signed by SRIKANTH Garcia on 9/5/2021 at 09:23 CDT     I have discussed the care of Reggie Treviño, including pertinent history and exam findings, with the nurse practitioner.    I have seen and examined the patient and the key elements of all parts of the encounter have been performed by me.  I agree with the assessment, plan and orders as documented by SRIKANTH Garcia, after I modified the exam findings and the plan of treatments and the final version is my approved version of the  assessment.        Electronically signed by Shahab Garcia MD on 9/5/2021 at 21:05 CDT

## 2021-09-06 LAB
GLUCOSE BLDC GLUCOMTR-MCNC: 121 MG/DL (ref 70–130)
GLUCOSE BLDC GLUCOMTR-MCNC: 145 MG/DL (ref 70–130)
GLUCOSE BLDC GLUCOMTR-MCNC: 165 MG/DL (ref 70–130)

## 2021-09-06 PROCEDURE — 63710000001 INSULIN DETEMIR PER 5 UNITS: Performed by: INTERNAL MEDICINE

## 2021-09-06 PROCEDURE — 99232 SBSQ HOSP IP/OBS MODERATE 35: CPT | Performed by: OTOLARYNGOLOGY

## 2021-09-06 PROCEDURE — 97535 SELF CARE MNGMENT TRAINING: CPT

## 2021-09-06 PROCEDURE — 97116 GAIT TRAINING THERAPY: CPT

## 2021-09-06 PROCEDURE — 25010000002 CEFTAZIDIME PER 500 MG: Performed by: INTERNAL MEDICINE

## 2021-09-06 PROCEDURE — 63710000001 INSULIN LISPRO (HUMAN) PER 5 UNITS: Performed by: INTERNAL MEDICINE

## 2021-09-06 PROCEDURE — 82962 GLUCOSE BLOOD TEST: CPT

## 2021-09-06 NOTE — PROGRESS NOTES
Jose Garcia M.D.  SRIKANTH Delgado        Internal Medicine Progress Note    9/6/2021   09:23 CDT    Name:  Reggie Treviño  MRN:    [id number]     Acct:     [id number]   Room:  [address] [place] [address] Day: 0     Admit Date: 9/1/2021  8:21 PM  PCP: Terrell Navas DO    Subjective:     C/C: weakness, cough    Interval History: Status:  stayed the same. Up to chair.  No family at bedside. Returned from shower and feeling better. Afebrile. Tolerating trach collar at 40% and voicing well with PMV. Progressing with therapy. Blood sugars stable. Tachycardia with increased exertion.     Review of Systems   Constitutional: Positive for malaise/fatigue. Negative for chills, decreased appetite, weight gain and weight loss.   HENT: Negative for congestion, ear discharge, hoarse voice and tinnitus.    Eyes: Negative for blurred vision, discharge, visual disturbance and visual halos.   Cardiovascular: Negative for chest pain, claudication, dyspnea on exertion, irregular heartbeat, leg swelling, orthopnea and paroxysmal nocturnal dyspnea.   Respiratory: Positive for cough. Negative for shortness of breath, sputum production and wheezing.    Endocrine: Negative for cold intolerance, heat intolerance and polyuria.   Hematologic/Lymphatic: Negative for adenopathy. Does not bruise/bleed easily.   Skin: Negative for dry skin, itching and suspicious lesions.   Musculoskeletal: Negative for arthritis, back pain, falls, joint pain, muscle weakness and myalgias.   Gastrointestinal: Negative for abdominal pain, constipation, diarrhea, dysphagia and hematemesis.   Genitourinary: Negative for bladder incontinence, dysuria and frequency.   Neurological: Positive for weakness. Negative for aphonia, disturbances in coordination and dizziness.   Psychiatric/Behavioral: Negative for altered mental status, depression, memory loss and substance abuse. The patient does not have insomnia and is not nervous/anxious.           Medications:     Allergies:   Allergies   Allergen Reactions   • Sulfa Antibiotics Rash       Current Meds:   Current Facility-Administered Medications:   •  acetaminophen (TYLENOL) tablet 500 mg, 500 mg, Oral, Q8H, Shahab Garcia MD  •  albuterol sulfate HFA (PROVENTIL HFA;VENTOLIN HFA;PROAIR HFA) inhaler 6 puff, 6 puff, Inhalation, Q4H PRN, Shahab Garcia MD  •  artificial tears (REFRESH LACRI-LUBE) ophthalmic ointment, , Both Eyes, 4x Daily, Shahab Garcia MD  •  cefTAZidime (FORTAZ) 2 g/100 mL 0.9% NS IVPB (mpb), 2 g, Intravenous, Q8H, Shahab Garcia MD  •  chlorhexidine (PERIDEX) 0.12 % solution 15 mL, 15 mL, Mouth/Throat, Q12H, Shahab Garcia MD  •  dextromethorphan polistirex ER (DELSYM) 30 MG/5ML oral suspension 60 mg, 60 mg, Oral, Q12H PRN, Debi Choi APRN  •  dextrose (D50W) 25 g/ 50mL Intravenous Solution 25 g, 25 g, Intravenous, Q15 Min PRN, Shahab Garcia MD  •  dextrose (GLUTOSE) oral gel 15 g, 15 g, Oral, Q15 Min PRN, Shahab Garcia MD  •  docusate (COLACE) 50 MG/5ML liquid 150 mg, 150 mg, Oral, BID, Shahab Garcia MD  •  famotidine (PEPCID) 40 MG/5ML suspension 20 mg, 20 mg, Oral, Q12H, Shahab Garcia MD  •  glucagon (human recombinant) (GLUCAGEN DIAGNOSTIC) injection 1 mg, 1 mg, Subcutaneous, Q15 Min PRN, Shahab Garcia MD  •  hypromellose (ISOPTO TEARS) 0.5 % ophthalmic solution 2 drop, 2 drop, Both Eyes, Q2H PRN, Shahab Garcia MD  •  insulin detemir (LEVEMIR) injection 5 Units, 5 Units, Subcutaneous, Q12H, Shahab Garcia MD  •  insulin lispro (humaLOG) injection 0-14 Units, 0-14 Units, Subcutaneous, 4x Daily AC & at Bedtime, Shahab Garcia MD  •  ipratropium-albuterol (DUO-NEB) nebulizer solution 3 mL, 3 mL, Nebulization, Q6H - RT, Onofre Yee MD  •  loperamide (IMODIUM) capsule 2 mg, 2 mg, Oral, 4x Daily PRN, Shahab Garcia MD  •  ondansetron (ZOFRAN) injection  "4 mg, 4 mg, Intravenous, Q6H PRN **OR** ondansetron (ZOFRAN) tablet 4 mg, 4 mg, Oral, Q6H PRN, Shahab Garcia MD  •  polyethylene glycol (MIRALAX) packet 17 g, 17 g, Oral, Q12H, Shahab Garcia MD  •  saccharomyces boulardii (FLORASTOR) capsule 250 mg, 250 mg, Oral, BID, Shahab Garcia MD  •  sodium chloride 0.9 % flush 10 mL, 10 mL, Intravenous, Q12H, Shahab Garcia MD  •  sodium chloride 0.9 % flush 10 mL, 10 mL, Intravenous, PRN, Shahab Garcia MD  •  traZODone (DESYREL) tablet 25 mg, 25 mg, Oral, Nightly PRN, Shahab Garcia MD  •  zolpidem (AMBIEN) tablet 5 mg, 5 mg, Per G Tube, Nightly PRN, Shahab Garcia MD    Data:     Code Status:    There are no questions and answers to display.       No family history on file.    Social History     Socioeconomic History   • Marital status:      Spouse name: Not on file   • Number of children: Not on file   • Years of education: Not on file   • Highest education level: Not on file   Tobacco Use   • Smoking status: Unknown If Ever Smoked       Vitals:  Ht 172.7 cm (68\")   Wt 133 kg (294 lb)   BMI 44.70 kg/m²     T 98.6 P 88 R 32 /57 Sp02 99% (trach collar)        I/O (24Hr):  No intake or output data in the 24 hours ending 09/06/21 0923    Labs and imaging:      No results found for this or any previous visit (from the past 12 hour(s)).        Physical Examination:        Physical Exam  Vitals and nursing note reviewed.   Constitutional:       Appearance: He is well-developed.   HENT:      Head: Normocephalic and atraumatic.      Right Ear: External ear normal.      Left Ear: External ear normal.      Nose: Nose normal.      Mouth/Throat:      Mouth: Mucous membranes are moist.   Eyes:      Pupils: Pupils are equal, round, and reactive to light.   Neck:      Comments: Trach to collar  Cardiovascular:      Rate and Rhythm: Normal rate and regular rhythm.      Heart sounds: Normal heart sounds.   Pulmonary: "      Effort: Pulmonary effort is normal.      Breath sounds: Normal breath sounds.   Abdominal:      General: Bowel sounds are normal.      Palpations: Abdomen is soft.      Comments: obese   Musculoskeletal:         General: Normal range of motion.      Cervical back: Normal range of motion and neck supple.      Comments: Generalized weakness   Skin:     General: Skin is warm and dry.   Neurological:      Mental Status: He is alert and oriented to person, place, and time.      Deep Tendon Reflexes: Reflexes are normal and symmetric.   Psychiatric:         Behavior: Behavior normal.           Assessment:             Acute tracheostomy management (CMS/[facility])    Acute hypoxemic respiratory failure due to COVID-19 (CMS/[facility])    Obesity, morbid (CMS/[facility])    TODD (obstructive sleep apnea)    Past Medical History:   Diagnosis Date   • Kidney stone         Plan:        1. Acute hypoxic respiratory failure secondary to COVID 19  2. Dysphagia  3. Bilateral chemosis  4. Stenotrophomonas pneumonia  5. Morbid obesity  6. PTSD  7. Hyperglycemia    Continue current treatment. Monitor counts. Increase activity. Labs in am.  Aggressive therapies as tolerated. Oxygen weaning as tolerated. Glycemic control. Maintain patient safety.       Electronically signed by SRIKANTH Fulton on 9/6/2021 at 09:23 CDT

## 2021-09-06 NOTE — PROGRESS NOTES
[facility] Otolaryngology Head and Neck Surgery  INPATIENT PROGRESS NOTE    Patient Name: Reggie Treviño  : 1985   MRN: [id number]  Date of Admission: 2021  Today's Date: 21  Length of Stay: 0  574/1    Shahab Garcia MD   Primary Care Physician: Terrell Navas,   Surgical Procedures Since Admission:    Subjective   Subjective   Chief Complaint: Trach management  HPI   Accompanied by: RT  Since last examined, Reggie Treviño has remained with trach in position.  He is tolerating Passy-Winston valve very well.  He says he is swallowing and breathing well.  He is still very weak when he ambulates.  RT reports the patient tolerates Passy-Gonzalez valve.  They wish to begin capping trials.  Patient is seen, chart is reviewed    Review of Systems   No change from prior inquiry  All pertinent negatives and positives are as above. All other systems have been reviewed and are negative unless otherwise stated.   Objective   Objective   Vitals:        Physical Exam  Constitutional:       General: He is awake. He is not in acute distress.     Appearance: He is well-developed. He is obese.      Interventions: He is intubated.   HENT:      Head: Normocephalic and atraumatic.      Comments: Face-adiposis     Right Ear: Hearing and external ear normal.      Left Ear: Hearing and external ear normal.      Nose: Nose normal.      Mouth/Throat:      Mouth: Mucous membranes are dry. No injury.      Dentition: Normal dentition. No gum lesions.      Tongue: No lesions. Tongue does not deviate from midline.      Comments: Macroglossia-severe  Prolapse-severe  OP exam deferred  Eyes:      General: Lids are normal. Gaze aligned appropriately.      Extraocular Movements: Extraocular movements intact.      Conjunctiva/sclera: Conjunctivae normal.   Neck:      Comments: Larynx-very low    TRACHEOSTOMY SITE:    Tracheostomy tube type: Shiley #6 cuffed DIC XLT P    Stoma: Healing well     Passey-Freeman valve: Tolerating well    Voice: Good with In position, somewhat breathy  Date placed: 7/30/2021  Date last changed: Unknown  Pulmonary:      Effort: He is intubated.   Musculoskeletal:      Cervical back: Decreased range of motion.   Neurological:      Mental Status: He is alert.   Psychiatric:         Behavior: Behavior is cooperative.           Result Review    Result Review:  I have personally reviewed the results from the time of this admission to 9/6/2021 11:59 CDT and agree with these findings:  []  Laboratory  []  Microbiology  []  Radiology  []  EKG/Telemetry   []  Cardiology/Vascular   []  Pathology  [x]  Old records  []  Other:  Most notable findings include: None pertinent to ENT evaluation today  Progress Notes by Shahab Garcia MD (09/05/2021 09:23)   Progress Notes by Debi Choi APRN (09/06/2021 08:40)       Assessment/Plan   Assessment / Plan   Brief Patient Summary:  Reggie Treviño is a 36 y.o. male who has remained with a tracheostomy tube in position.  He is tolerating Passy-Gonzalez valve well.  He has been swapped to capping today.    Active Hospital Problems:   Active Hospital Problems    Diagnosis    • Acute tracheostomy management (CMS/[facility])    • Acute hypoxemic respiratory failure due to COVID-19 (CMS/[facility])    • Obesity, morbid (CMS/[facility])    • TODD (obstructive sleep apnea)      Plan:   • Trach management-patient has his current Shiley XLT P in position.  I will begin capping with his current trach in position since he is tolerating Passy-Freeman valves so well.  Continue trach care, and trach capping.  • Respiratory failure-patient is improving.  Per primary team  Discussed Plan with RT, patient  Following patient as in-patient. Further recommendations will be made based on serial examinations.    Medications/Orders for this encounter: No new medications ordered.  Orders-  Continuous capping     Discharge Planning: Per primary team        DVT prophylaxis:  No DVT  prophylaxis order currently exists.     Electronically signed by Davin David Jr, MD, 09/06/21, 11:59 AM CDT.

## 2021-09-07 LAB
ANION GAP SERPL CALCULATED.3IONS-SCNC: 9 MMOL/L (ref 5–15)
BASOPHILS # BLD AUTO: 0.02 10*3/MM3 (ref 0–0.2)
BASOPHILS NFR BLD AUTO: 0.2 % (ref 0–1.5)
BUN SERPL-MCNC: 5 MG/DL (ref 6–20)
BUN/CREAT SERPL: 12.2 (ref 7–25)
CALCIUM SPEC-SCNC: 9.1 MG/DL (ref 8.6–10.5)
CHLORIDE SERPL-SCNC: 102 MMOL/L (ref 98–107)
CO2 SERPL-SCNC: 30 MMOL/L (ref 22–29)
CREAT SERPL-MCNC: 0.41 MG/DL (ref 0.76–1.27)
DEPRECATED RDW RBC AUTO: 56.6 FL (ref 37–54)
EOSINOPHIL # BLD AUTO: 0.68 10*3/MM3 (ref 0–0.4)
EOSINOPHIL NFR BLD AUTO: 6.7 % (ref 0.3–6.2)
ERYTHROCYTE [DISTWIDTH] IN BLOOD BY AUTOMATED COUNT: 17 % (ref 12.3–15.4)
GFR SERPL CREATININE-BSD FRML MDRD: >150 ML/MIN/1.73
GLUCOSE BLDC GLUCOMTR-MCNC: 138 MG/DL (ref 70–130)
GLUCOSE BLDC GLUCOMTR-MCNC: 94 MG/DL (ref 70–130)
GLUCOSE BLDC GLUCOMTR-MCNC: 95 MG/DL (ref 70–130)
GLUCOSE SERPL-MCNC: 100 MG/DL (ref 65–99)
HCT VFR BLD AUTO: 30.1 % (ref 37.5–51)
HGB BLD-MCNC: 9.6 G/DL (ref 13–17.7)
IMM GRANULOCYTES # BLD AUTO: 0.05 10*3/MM3 (ref 0–0.05)
IMM GRANULOCYTES NFR BLD AUTO: 0.5 % (ref 0–0.5)
LYMPHOCYTES # BLD AUTO: 1.05 10*3/MM3 (ref 0.7–3.1)
LYMPHOCYTES NFR BLD AUTO: 10.4 % (ref 19.6–45.3)
MCH RBC QN AUTO: 29.5 PG (ref 26.6–33)
MCHC RBC AUTO-ENTMCNC: 31.9 G/DL (ref 31.5–35.7)
MCV RBC AUTO: 92.6 FL (ref 79–97)
MONOCYTES # BLD AUTO: 0.78 10*3/MM3 (ref 0.1–0.9)
MONOCYTES NFR BLD AUTO: 7.7 % (ref 5–12)
NEUTROPHILS NFR BLD AUTO: 7.56 10*3/MM3 (ref 1.7–7)
NEUTROPHILS NFR BLD AUTO: 74.5 % (ref 42.7–76)
NRBC BLD AUTO-RTO: 0 /100 WBC (ref 0–0.2)
NT-PROBNP SERPL-MCNC: 35.7 PG/ML (ref 0–450)
PLATELET # BLD AUTO: 311 10*3/MM3 (ref 140–450)
PMV BLD AUTO: 9.3 FL (ref 6–12)
POTASSIUM SERPL-SCNC: 3.7 MMOL/L (ref 3.5–5.2)
RBC # BLD AUTO: 3.25 10*6/MM3 (ref 4.14–5.8)
SODIUM SERPL-SCNC: 141 MMOL/L (ref 136–145)
WBC # BLD AUTO: 10.14 10*3/MM3 (ref 3.4–10.8)

## 2021-09-07 PROCEDURE — 85025 COMPLETE CBC W/AUTO DIFF WBC: CPT | Performed by: INTERNAL MEDICINE

## 2021-09-07 PROCEDURE — 80048 BASIC METABOLIC PNL TOTAL CA: CPT | Performed by: INTERNAL MEDICINE

## 2021-09-07 PROCEDURE — 97110 THERAPEUTIC EXERCISES: CPT

## 2021-09-07 PROCEDURE — 97116 GAIT TRAINING THERAPY: CPT

## 2021-09-07 PROCEDURE — 63710000001 INSULIN DETEMIR PER 5 UNITS: Performed by: INTERNAL MEDICINE

## 2021-09-07 PROCEDURE — 25010000002 CEFTAZIDIME PER 500 MG: Performed by: INTERNAL MEDICINE

## 2021-09-07 PROCEDURE — 82962 GLUCOSE BLOOD TEST: CPT

## 2021-09-07 PROCEDURE — 83880 ASSAY OF NATRIURETIC PEPTIDE: CPT | Performed by: INTERNAL MEDICINE

## 2021-09-07 NOTE — PROGRESS NOTES
Jose Garcia M.D.  SRIKANTH Delgado        Internal Medicine Progress Note    9/7/2021   14:23 CDT    Name:  Reggie Treviño  MRN:    [id number]     Acct:     [id number]   Room:  4/[facility] Day: 0     Admit Date: 9/1/2021  8:21 PM  PCP: Terrell Navas DO    Subjective:     C/C: weakness, cough    Interval History: Status:  stayed the same. Up to chair.  No family at bedside. Tolerating trach with cap. 02 sats stable with 02 at 10 lpm - reports that it was turned up while he was transferring to and from bedside commode and that it can probably be turned back down. Low grade temp noted. Counts stable.     Review of Systems   Constitutional: Positive for malaise/fatigue. Negative for chills, decreased appetite, weight gain and weight loss.   HENT: Negative for congestion, ear discharge, hoarse voice and tinnitus.    Eyes: Negative for blurred vision, discharge, visual disturbance and visual halos.   Cardiovascular: Negative for chest pain, claudication, dyspnea on exertion, irregular heartbeat, leg swelling, orthopnea and paroxysmal nocturnal dyspnea.   Respiratory: Positive for cough. Negative for shortness of breath, sputum production and wheezing.    Endocrine: Negative for cold intolerance, heat intolerance and polyuria.   Hematologic/Lymphatic: Negative for adenopathy. Does not bruise/bleed easily.   Skin: Negative for dry skin, itching and suspicious lesions.   Musculoskeletal: Negative for arthritis, back pain, falls, joint pain, muscle weakness and myalgias.   Gastrointestinal: Negative for abdominal pain, constipation, diarrhea, dysphagia and hematemesis.   Genitourinary: Negative for bladder incontinence, dysuria and frequency.   Neurological: Positive for weakness. Negative for aphonia, disturbances in coordination and dizziness.   Psychiatric/Behavioral: Negative for altered mental status, depression, memory loss and substance abuse. The patient does not have insomnia and is  not nervous/anxious.          Medications:     Allergies:   Allergies   Allergen Reactions   • Sulfa Antibiotics Rash       Current Meds:   Current Facility-Administered Medications:   •  acetaminophen (TYLENOL) tablet 500 mg, 500 mg, Oral, Q8H, Shahab Garcia MD  •  albuterol sulfate HFA (PROVENTIL HFA;VENTOLIN HFA;PROAIR HFA) inhaler 6 puff, 6 puff, Inhalation, Q4H PRN, Shahab Garcia MD  •  artificial tears (REFRESH LACRI-LUBE) ophthalmic ointment, , Both Eyes, 4x Daily, Shahab Gracia MD  •  cefTAZidime (FORTAZ) 2 g/100 mL 0.9% NS IVPB (mpb), 2 g, Intravenous, Q8H, Shahab Garcia MD  •  chlorhexidine (PERIDEX) 0.12 % solution 15 mL, 15 mL, Mouth/Throat, Q12H, Shahab Garcia MD  •  dextromethorphan polistirex ER (DELSYM) 30 MG/5ML oral suspension 60 mg, 60 mg, Oral, Q12H PRN, Debi Choi APRN  •  dextrose (D50W) 25 g/ 50mL Intravenous Solution 25 g, 25 g, Intravenous, Q15 Min PRN, Shahab Garcia MD  •  dextrose (GLUTOSE) oral gel 15 g, 15 g, Oral, Q15 Min PRN, Shahab Garcia MD  •  docusate (COLACE) 50 MG/5ML liquid 150 mg, 150 mg, Oral, BID, Shahab Garcia MD  •  famotidine (PEPCID) 40 MG/5ML suspension 20 mg, 20 mg, Oral, Q12H, Shahab Garcia MD  •  glucagon (human recombinant) (GLUCAGEN DIAGNOSTIC) injection 1 mg, 1 mg, Subcutaneous, Q15 Min PRN, Shahab Garcia MD  •  hypromellose (ISOPTO TEARS) 0.5 % ophthalmic solution 2 drop, 2 drop, Both Eyes, Q2H PRN, Shahab Garcia MD  •  insulin detemir (LEVEMIR) injection 5 Units, 5 Units, Subcutaneous, Q12H, Shahab Garcia MD  •  insulin lispro (humaLOG) injection 0-14 Units, 0-14 Units, Subcutaneous, 4x Daily AC & at Bedtime, Shahab Garcia MD  •  ipratropium-albuterol (DUO-NEB) nebulizer solution 3 mL, 3 mL, Nebulization, Q6H - RT, SensOnofre osullivan MD  •  loperamide (IMODIUM) capsule 2 mg, 2 mg, Oral, 4x Daily PRN, Shahab Garcia MD  •   "ondansetron (ZOFRAN) injection 4 mg, 4 mg, Intravenous, Q6H PRN **OR** ondansetron (ZOFRAN) tablet 4 mg, 4 mg, Oral, Q6H PRN, Shahab Garcia MD  •  polyethylene glycol (MIRALAX) packet 17 g, 17 g, Oral, Q12H, Shahab Garcia MD  •  saccharomyces boulardii (FLORASTOR) capsule 250 mg, 250 mg, Oral, BID, Shahab Garcia MD  •  sodium chloride 0.9 % flush 10 mL, 10 mL, Intravenous, Q12H, Shahab Garcia MD  •  sodium chloride 0.9 % flush 10 mL, 10 mL, Intravenous, PRN, Shahab Garcia MD  •  traZODone (DESYREL) tablet 25 mg, 25 mg, Oral, Nightly PRN, Shahab Garcia MD  •  zolpidem (AMBIEN) tablet 5 mg, 5 mg, Per G Tube, Nightly PRN, Shahab Garcia MD    Data:     Code Status:    There are no questions and answers to display.       No family history on file.    Social History     Socioeconomic History   • Marital status:      Spouse name: Not on file   • Number of children: Not on file   • Years of education: Not on file   • Highest education level: Not on file   Tobacco Use   • Smoking status: Unknown If Ever Smoked       Vitals:  Ht 172.7 cm (68\")   Wt 133 kg (294 lb)   BMI 44.70 kg/m²     T 99.5 P 100 R 24 /79 Sp02 98% (10 lpm)        I/O (24Hr):  No intake or output data in the 24 hours ending 09/07/21 1423    Labs and imaging:      Recent Results (from the past 12 hour(s))   Basic Metabolic Panel    Collection Time: 09/07/21  4:54 AM    Specimen: Blood   Result Value Ref Range    Glucose 100 (H) 65 - 99 mg/dL    BUN 5 (L) 6 - 20 mg/dL    Creatinine 0.41 (L) 0.76 - 1.27 mg/dL    Sodium 141 136 - 145 mmol/L    Potassium 3.7 3.5 - 5.2 mmol/L    Chloride 102 98 - 107 mmol/L    CO2 30.0 (H) 22.0 - 29.0 mmol/L    Calcium 9.1 8.6 - 10.5 mg/dL    eGFR Non African Amer >150 >60 mL/min/1.73    BUN/Creatinine Ratio 12.2 7.0 - 25.0    Anion Gap 9.0 5.0 - 15.0 mmol/L   BNP    Collection Time: 09/07/21  4:54 AM    Specimen: Blood   Result Value Ref Range    proBNP " 35.7 0.0 - 450.0 pg/mL   CBC Auto Differential    Collection Time: 09/07/21  4:54 AM    Specimen: Blood   Result Value Ref Range    WBC 10.14 3.40 - 10.80 10*3/mm3    RBC 3.25 (L) 4.14 - 5.80 10*6/mm3    Hemoglobin 9.6 (L) 13.0 - 17.7 g/dL    Hematocrit 30.1 (L) 37.5 - 51.0 %    MCV 92.6 79.0 - 97.0 fL    MCH 29.5 26.6 - 33.0 pg    MCHC 31.9 31.5 - 35.7 g/dL    RDW 17.0 (H) 12.3 - 15.4 %    RDW-SD 56.6 (H) 37.0 - 54.0 fl    MPV 9.3 6.0 - 12.0 fL    Platelets 311 140 - 450 10*3/mm3    Neutrophil % 74.5 42.7 - 76.0 %    Lymphocyte % 10.4 (L) 19.6 - 45.3 %    Monocyte % 7.7 5.0 - 12.0 %    Eosinophil % 6.7 (H) 0.3 - 6.2 %    Basophil % 0.2 0.0 - 1.5 %    Immature Grans % 0.5 0.0 - 0.5 %    Neutrophils, Absolute 7.56 (H) 1.70 - 7.00 10*3/mm3    Lymphocytes, Absolute 1.05 0.70 - 3.10 10*3/mm3    Monocytes, Absolute 0.78 0.10 - 0.90 10*3/mm3    Eosinophils, Absolute 0.68 (H) 0.00 - 0.40 10*3/mm3    Basophils, Absolute 0.02 0.00 - 0.20 10*3/mm3    Immature Grans, Absolute 0.05 0.00 - 0.05 10*3/mm3    nRBC 0.0 0.0 - 0.2 /100 WBC   POC Glucose Once    Collection Time: 09/07/21  8:42 AM    Specimen: Blood   Result Value Ref Range    Glucose 138 (H) 70 - 130 mg/dL           Physical Examination:        Physical Exam  Vitals and nursing note reviewed.   Constitutional:       Appearance: He is well-developed.   HENT:      Head: Normocephalic and atraumatic.      Right Ear: External ear normal.      Left Ear: External ear normal.      Nose: Nose normal.      Mouth/Throat:      Mouth: Mucous membranes are moist.   Eyes:      Pupils: Pupils are equal, round, and reactive to light.   Neck:      Comments: Trach capped  Cardiovascular:      Rate and Rhythm: Normal rate and regular rhythm.      Heart sounds: Normal heart sounds.   Pulmonary:      Effort: Pulmonary effort is normal.      Breath sounds: Normal breath sounds.   Abdominal:      General: Bowel sounds are normal.      Palpations: Abdomen is soft.      Comments: obese    Musculoskeletal:         General: Normal range of motion.      Cervical back: Normal range of motion and neck supple.      Comments: Generalized weakness   Skin:     General: Skin is warm and dry.   Neurological:      Mental Status: He is alert and oriented to person, place, and time.      Deep Tendon Reflexes: Reflexes are normal and symmetric.   Psychiatric:         Behavior: Behavior normal.           Assessment:             Acute tracheostomy management (CMS/[facility])    Acute hypoxemic respiratory failure due to COVID-19 (CMS/[facility])    Obesity, morbid (CMS/[facility])    TODD (obstructive sleep apnea)    Past Medical History:   Diagnosis Date   • Kidney stone         Plan:        1. Acute hypoxic respiratory failure secondary to COVID 19  2. Dysphagia  3. Bilateral chemosis  4. Stenotrophomonas pneumonia  5. Morbid obesity  6. PTSD  7. Hyperglycemia    Continue current treatment. Monitor counts. Increase activity. Labs Thursday.  Aggressive therapies as tolerated. Oxygen weaning as tolerated. Glycemic control. Maintain patient safety.       Electronically signed by SRIKANTH Fulton on 9/7/2021 at 14:23 CDT     I have discussed the care of Reggie Treviño, including pertinent history and exam findings, with the nurse practitioner.    I have seen and examined the patient and the key elements of all parts of the encounter have been performed by me.  I agree with the assessment, plan and orders as documented by SRIKANTH Delgado, after I modified the exam findings and the plan of treatments and the final version is my approved version of the assessment.        Electronically signed by Shahab Garcia MD on 9/7/2021 at 19:20 CDT

## 2021-09-08 LAB
GLUCOSE BLDC GLUCOMTR-MCNC: 114 MG/DL (ref 70–130)
GLUCOSE BLDC GLUCOMTR-MCNC: 117 MG/DL (ref 70–130)
GLUCOSE BLDC GLUCOMTR-MCNC: 97 MG/DL (ref 70–130)

## 2021-09-08 PROCEDURE — 82962 GLUCOSE BLOOD TEST: CPT

## 2021-09-08 PROCEDURE — 97535 SELF CARE MNGMENT TRAINING: CPT

## 2021-09-08 PROCEDURE — 97530 THERAPEUTIC ACTIVITIES: CPT

## 2021-09-08 PROCEDURE — 25010000002 CEFTAZIDIME PER 500 MG: Performed by: INTERNAL MEDICINE

## 2021-09-08 PROCEDURE — 63710000001 INSULIN DETEMIR PER 5 UNITS: Performed by: INTERNAL MEDICINE

## 2021-09-08 PROCEDURE — 97116 GAIT TRAINING THERAPY: CPT

## 2021-09-08 RX ORDER — DIAPER,BRIEF,INFANT-TODD,DISP
EACH MISCELLANEOUS EVERY 12 HOURS SCHEDULED
Status: DISCONTINUED | OUTPATIENT
Start: 2021-09-08 | End: 2021-09-23 | Stop reason: HOSPADM

## 2021-09-08 RX ORDER — POLYETHYLENE GLYCOL 3350 17 G/17G
17 POWDER, FOR SOLUTION ORAL EVERY 12 HOURS PRN
Status: DISCONTINUED | OUTPATIENT
Start: 2021-09-08 | End: 2021-09-23 | Stop reason: HOSPADM

## 2021-09-08 NOTE — PROGRESS NOTES
"[facility] PartnersRamon Garcia M.D.  SRIKANTH Delgado        Internal Medicine Progress Note    9/8/2021   09:43 CDT    Name:  Reggie Treviño  MRN:    [id number]     Acct:     [id number]   Room:  [address] [place] [address] Day: 0     Admit Date: 9/1/2021  8:21 PM  PCP: Terrell Navas DO    Subjective:     C/C: weakness, cough    Interval History: Status:  stayed the same. Up to chair.  No family at bedside. Tolerating trach with PMV. 02 sats stable with 02 at 10 lpm - it has been turned up as he is preparing for therapy. Afebrile. Reports that he is a \"Mouth breather\" when he sleeps and is concerned about being able to maintain adequate 02 sats with nasal cannula. No history of workup for sleep apnea. Blood sugars stable.     Review of Systems   Constitutional: Positive for malaise/fatigue. Negative for chills, decreased appetite, weight gain and weight loss.   HENT: Negative for congestion, ear discharge, hoarse voice and tinnitus.    Eyes: Negative for blurred vision, discharge, visual disturbance and visual halos.   Cardiovascular: Negative for chest pain, claudication, dyspnea on exertion, irregular heartbeat, leg swelling, orthopnea and paroxysmal nocturnal dyspnea.   Respiratory: Positive for cough. Negative for shortness of breath, sputum production and wheezing.    Endocrine: Negative for cold intolerance, heat intolerance and polyuria.   Hematologic/Lymphatic: Negative for adenopathy. Does not bruise/bleed easily.   Skin: Negative for dry skin, itching and suspicious lesions.   Musculoskeletal: Negative for arthritis, back pain, falls, joint pain, muscle weakness and myalgias.   Gastrointestinal: Negative for abdominal pain, constipation, diarrhea, dysphagia and hematemesis.   Genitourinary: Negative for bladder incontinence, dysuria and frequency.   Neurological: Positive for weakness. Negative for aphonia, disturbances in coordination and dizziness.   Psychiatric/Behavioral: Negative for altered mental " status, depression, memory loss and substance abuse. The patient does not have insomnia and is not nervous/anxious.          Medications:     Allergies:   Allergies   Allergen Reactions   • Sulfa Antibiotics Rash       Current Meds:   Current Facility-Administered Medications:   •  acetaminophen (TYLENOL) tablet 500 mg, 500 mg, Oral, Q8H, Shahab Garcia MD  •  albuterol sulfate HFA (PROVENTIL HFA;VENTOLIN HFA;PROAIR HFA) inhaler 6 puff, 6 puff, Inhalation, Q4H PRN, Shahab Garcia MD  •  artificial tears (REFRESH LACRI-LUBE) ophthalmic ointment, , Both Eyes, 4x Daily, Shahab Garcia MD  •  cefTAZidime (FORTAZ) 2 g/100 mL 0.9% NS IVPB (mpb), 2 g, Intravenous, Q8H, Shahab Garcia MD  •  chlorhexidine (PERIDEX) 0.12 % solution 15 mL, 15 mL, Mouth/Throat, Q12H, Shahab Garcia MD  •  dextromethorphan polistirex ER (DELSYM) 30 MG/5ML oral suspension 60 mg, 60 mg, Oral, Q12H PRN, Debi Choi APRN  •  dextrose (D50W) 25 g/ 50mL Intravenous Solution 25 g, 25 g, Intravenous, Q15 Min PRN, Shahab Garcia MD  •  dextrose (GLUTOSE) oral gel 15 g, 15 g, Oral, Q15 Min PRN, Shahab Garcia MD  •  docusate (COLACE) 50 MG/5ML liquid 150 mg, 150 mg, Oral, BID, Shahab Garcia MD  •  famotidine (PEPCID) 40 MG/5ML suspension 20 mg, 20 mg, Oral, Q12H, Shahab Garcia MD  •  glucagon (human recombinant) (GLUCAGEN DIAGNOSTIC) injection 1 mg, 1 mg, Subcutaneous, Q15 Min PRN, Shahab Garcia MD  •  hypromellose (ISOPTO TEARS) 0.5 % ophthalmic solution 2 drop, 2 drop, Both Eyes, Q2H PRN, Shahab Garcia MD  •  insulin detemir (LEVEMIR) injection 5 Units, 5 Units, Subcutaneous, Q12H, Shahab Garcia MD  •  insulin lispro (humaLOG) injection 0-14 Units, 0-14 Units, Subcutaneous, 4x Daily AC & at Bedtime, Shahab Garcia MD  •  ipratropium-albuterol (DUO-NEB) nebulizer solution 3 mL, 3 mL, Nebulization, Q6H - RT, Onofre Yee MD  •   "loperamide (IMODIUM) capsule 2 mg, 2 mg, Oral, 4x Daily PRN, Shahab Garcia MD  •  ondansetron (ZOFRAN) injection 4 mg, 4 mg, Intravenous, Q6H PRN **OR** ondansetron (ZOFRAN) tablet 4 mg, 4 mg, Oral, Q6H PRN, Shahab Garcia MD  •  polyethylene glycol (MIRALAX) packet 17 g, 17 g, Oral, Q12H, Shahab Garcia MD  •  saccharomyces boulardii (FLORASTOR) capsule 250 mg, 250 mg, Oral, BID, Shahab Garcia MD  •  sodium chloride 0.9 % flush 10 mL, 10 mL, Intravenous, Q12H, Shahab Garcia MD  •  sodium chloride 0.9 % flush 10 mL, 10 mL, Intravenous, PRN, Shahab Garcia MD  •  traZODone (DESYREL) tablet 25 mg, 25 mg, Oral, Nightly PRN, Shahab Garcia MD  •  zolpidem (AMBIEN) tablet 5 mg, 5 mg, Per G Tube, Nightly PRN, Shahab Garcia MD    Data:     Code Status:    There are no questions and answers to display.       No family history on file.    Social History     Socioeconomic History   • Marital status:      Spouse name: Not on file   • Number of children: Not on file   • Years of education: Not on file   • Highest education level: Not on file   Tobacco Use   • Smoking status: Unknown If Ever Smoked       Vitals:  Ht 172.7 cm (68\")   Wt 133 kg (294 lb)   BMI 44.70 kg/m²     T 97.1 P 79 R 26 /55 Sp02 99% (10 lpm)        I/O (24Hr):  No intake or output data in the 24 hours ending 09/08/21 0943    Labs and imaging:      Recent Results (from the past 12 hour(s))   POC Glucose Once    Collection Time: 09/08/21  7:40 AM    Specimen: Blood   Result Value Ref Range    Glucose 97 70 - 130 mg/dL           Physical Examination:        Physical Exam  Vitals and nursing note reviewed.   Constitutional:       Appearance: He is well-developed.   HENT:      Head: Normocephalic and atraumatic.      Right Ear: External ear normal.      Left Ear: External ear normal.      Nose: Nose normal.      Mouth/Throat:      Mouth: Mucous membranes are moist.   Eyes:      " Pupils: Pupils are equal, round, and reactive to light.   Neck:      Comments: Trach to PMV  Cardiovascular:      Rate and Rhythm: Normal rate and regular rhythm.      Heart sounds: Normal heart sounds.   Pulmonary:      Effort: Pulmonary effort is normal.      Breath sounds: Normal breath sounds.   Abdominal:      General: Bowel sounds are normal.      Palpations: Abdomen is soft.      Comments: obese   Musculoskeletal:         General: Normal range of motion.      Cervical back: Normal range of motion and neck supple.      Comments: Generalized weakness   Skin:     General: Skin is warm and dry.   Neurological:      Mental Status: He is alert and oriented to person, place, and time.      Deep Tendon Reflexes: Reflexes are normal and symmetric.   Psychiatric:         Behavior: Behavior normal.           Assessment:             Acute tracheostomy management (CMS/[facility])    Acute hypoxemic respiratory failure due to COVID-19 (CMS/[facility])    Obesity, morbid (CMS/[facility])    TODD (obstructive sleep apnea)    Past Medical History:   Diagnosis Date   • Kidney stone         Plan:        1. Acute hypoxic respiratory failure secondary to COVID 19  2. Dysphagia - improved  3. Bilateral chemosis  4. Stenotrophomonas pneumonia  5. Morbid obesity  6. PTSD  7. Hyperglycemia    Continue current treatment. Monitor counts. Increase activity. Labs in am.  Aggressive therapies as tolerated. Oxygen weaning as tolerated. Glycemic control. Maintain patient safety. Trach management per ENT.       Electronically signed by SRIKANTH Fulton on 9/8/2021 at 09:43 CDT     I have discussed the care of Reggie Treviño, including pertinent history and exam findings, with the nurse practitioner.    I have seen and examined the patient and the key elements of all parts of the encounter have been performed by me.  I agree with the assessment, plan and orders as documented by SRIKANTH Delgado, after I modified the exam findings and the plan of  treatments and the final version is my approved version of the assessment.        Electronically signed by Shahab Garcia MD on 9/8/2021 at 14:38 CDT

## 2021-09-09 LAB
ANION GAP SERPL CALCULATED.3IONS-SCNC: 7 MMOL/L (ref 5–15)
BASOPHILS # BLD AUTO: 0.02 10*3/MM3 (ref 0–0.2)
BASOPHILS NFR BLD AUTO: 0.3 % (ref 0–1.5)
BUN SERPL-MCNC: 4 MG/DL (ref 6–20)
BUN/CREAT SERPL: 8.9 (ref 7–25)
CALCIUM SPEC-SCNC: 9.3 MG/DL (ref 8.6–10.5)
CHLORIDE SERPL-SCNC: 102 MMOL/L (ref 98–107)
CO2 SERPL-SCNC: 33 MMOL/L (ref 22–29)
CREAT SERPL-MCNC: 0.45 MG/DL (ref 0.76–1.27)
DEPRECATED RDW RBC AUTO: 56.4 FL (ref 37–54)
EOSINOPHIL # BLD AUTO: 0.6 10*3/MM3 (ref 0–0.4)
EOSINOPHIL NFR BLD AUTO: 8.8 % (ref 0.3–6.2)
ERYTHROCYTE [DISTWIDTH] IN BLOOD BY AUTOMATED COUNT: 16.6 % (ref 12.3–15.4)
GFR SERPL CREATININE-BSD FRML MDRD: >150 ML/MIN/1.73
GLUCOSE SERPL-MCNC: 97 MG/DL (ref 65–99)
HCT VFR BLD AUTO: 32.1 % (ref 37.5–51)
HGB BLD-MCNC: 9.9 G/DL (ref 13–17.7)
IMM GRANULOCYTES # BLD AUTO: 0.03 10*3/MM3 (ref 0–0.05)
IMM GRANULOCYTES NFR BLD AUTO: 0.4 % (ref 0–0.5)
LYMPHOCYTES # BLD AUTO: 1.02 10*3/MM3 (ref 0.7–3.1)
LYMPHOCYTES NFR BLD AUTO: 15 % (ref 19.6–45.3)
MCH RBC QN AUTO: 28.6 PG (ref 26.6–33)
MCHC RBC AUTO-ENTMCNC: 30.8 G/DL (ref 31.5–35.7)
MCV RBC AUTO: 92.8 FL (ref 79–97)
MONOCYTES # BLD AUTO: 0.53 10*3/MM3 (ref 0.1–0.9)
MONOCYTES NFR BLD AUTO: 7.8 % (ref 5–12)
NEUTROPHILS NFR BLD AUTO: 4.62 10*3/MM3 (ref 1.7–7)
NEUTROPHILS NFR BLD AUTO: 67.7 % (ref 42.7–76)
NRBC BLD AUTO-RTO: 0 /100 WBC (ref 0–0.2)
PLATELET # BLD AUTO: 292 10*3/MM3 (ref 140–450)
PMV BLD AUTO: 9.5 FL (ref 6–12)
POTASSIUM SERPL-SCNC: 3.7 MMOL/L (ref 3.5–5.2)
RBC # BLD AUTO: 3.46 10*6/MM3 (ref 4.14–5.8)
SODIUM SERPL-SCNC: 142 MMOL/L (ref 136–145)
WBC # BLD AUTO: 6.82 10*3/MM3 (ref 3.4–10.8)

## 2021-09-09 PROCEDURE — 85025 COMPLETE CBC W/AUTO DIFF WBC: CPT | Performed by: INTERNAL MEDICINE

## 2021-09-09 PROCEDURE — 97110 THERAPEUTIC EXERCISES: CPT

## 2021-09-09 PROCEDURE — 63710000001 INSULIN DETEMIR PER 5 UNITS: Performed by: INTERNAL MEDICINE

## 2021-09-09 PROCEDURE — 92526 ORAL FUNCTION THERAPY: CPT

## 2021-09-09 PROCEDURE — 97116 GAIT TRAINING THERAPY: CPT

## 2021-09-09 PROCEDURE — 80048 BASIC METABOLIC PNL TOTAL CA: CPT | Performed by: INTERNAL MEDICINE

## 2021-09-09 PROCEDURE — 25010000002 CEFTAZIDIME PER 500 MG: Performed by: INTERNAL MEDICINE

## 2021-09-09 RX ORDER — FAMOTIDINE 40 MG/5ML
20 POWDER, FOR SUSPENSION ORAL EVERY 12 HOURS SCHEDULED
Status: DISCONTINUED | OUTPATIENT
Start: 2021-09-09 | End: 2021-09-12

## 2021-09-09 NOTE — PROGRESS NOTES
"Adult Nutrition  Assessment/PES    Patient Name:  Reggie Treviño  YOB: 1985  MRN: [id number]  Admit Date:  9/1/2021    Assessment Date:  9/9/2021    Comments:  PO > 50%, goal for > 75%. Will continue to provide snacks and observe food preferences. Weight 263.9lb; will monitor trend. Goal for appropriate weight loss.     Reason for Assessment     Row Name 09/09/21 1017          Reason for Assessment    Reason For Assessment  follow-up protocol     Diagnosis  pulmonary disease;infection/sepsis     Identified At Risk by Screening Criteria  no indicators present         Nutrition/Diet History     Row Name 09/09/21 1017          Nutrition/Diet History    Typical Food/Fluid Intake  PO remains excellent. Food preferences observed. Will continue with nutrition therapy as ordered.         Anthropometrics     Row Name 09/09/21 1020 09/09/21 1019       Anthropometrics    Height  172.7 cm (68\")  172.7 cm (68\")       Ideal Body Weight (IBW)    Ideal Body Weight (IBW) (kg)  70.89  70.89    Row Name 09/09/21 1018          Anthropometrics    Height  172.7 cm (68\")     Weight  120 kg (263 lb 14.4 oz) 9/6/21        Admit Weight    Admit Weight  133 kg (294 lb) Per admission paper work; not using LTACH scale        Ideal Body Weight (IBW)    Ideal Body Weight (IBW) (kg)  70.89     % Ideal Body Weight  168.85        Body Mass Index (BMI)    BMI (kg/m2)  40.21     BMI Assessment  BMI 40 or greater: obesity grade III         Labs/Tests/Procedures/Meds     Row Name 09/09/21 1018          Labs/Procedures/Meds    Lab Results Reviewed  reviewed, pertinent        Diagnostic Tests/Procedures    Diagnostic Test/Procedure Reviewed  reviewed, pertinent        Medications    Pertinent Medications Reviewed  reviewed, pertinent     Pertinent Medications Comments  See MAR         Physical Findings     Row Name 09/09/21 1018          Physical Findings    Overall Physical Appearance  obese;on oxygen therapy     Gastrointestinal  -- BM " "9/7     Skin  other (see comments) Multiple areas healing         Estimated/Assessed Needs     Row Name 09/09/21 1020 09/09/21 1019       Calculation Measurements    Weight Used For Calculations  70 kg (154 lb 5.2 oz)  70 kg (154 lb 5.2 oz) IBW    Height  172.7 cm (68\")  172.7 cm (68\")       Estimated/Assessed Needs    Additional Documentation  --  KCAL/KG (Group);Protein Requirements (Group);Fluid Requirements (Group)       KCAL/KG    KCAL/KG  --  25 Kcal/Kg (kcal);30 Kcal/Kg (kcal)    25 Kcal/Kg (kcal)  1750  1750    30 Kcal/Kg (kcal)  2100  2100       Protein Requirements    Weight Used For Protein Calculations  --  70 kg (154 lb 5.2 oz) IBW    Est Protein Requirement Amount (gms/kg)  --  1.5 gm protein    Estimated Protein Requirements (gms/day)  --  105       Fluid Requirements    Fluid Requirements (mL/day)  --  2100    Estimated Fluid Requirement Method  --  RDA Method    RDA Method (mL)  --  2100    Row Name 09/09/21 1018          Calculation Measurements    Height  172.7 cm (68\")         Nutrition Prescription Ordered     Row Name 09/09/21 1020          Nutrition Prescription PO    Current PO Diet  Regular     Fluid Consistency  Thin         Evaluation of Received Nutrient/Fluid Intake     Row Name 09/09/21 1020          Calculation Measurements    Weight Used For Calculations  70 kg (154 lb 5.2 oz)     Height  172.7 cm (68\")        PO Evaluation    Number of Days PO Intake Evaluated  3 days     Number of Meals  8     % PO Intake  69               Problem/Interventions:  Problem 1     Row Name 09/09/21 1020          Nutrition Diagnoses Problem 1    Problem 1  Increased Nutrient Needs     Macronutrient  Protein     Etiology (related to)  Medical Diagnosis     Skin  Other (comment) Stage 2 to sacrum     Signs/Symptoms (evidenced by)  PO Intake;Other (comment) requested increased protein (likes meat); requires 1.5g pro/kg IBW for estimated needs for obesity class III.     Percent (%) intake recorded  69 %     " Over number of meals  8               Intervention Goal     Row Name 09/09/21 1021          Intervention Goal    General  Meet nutritional needs for age/condition;Disease management/therapy     PO  Maintain intake;Continue positive trend;PO intake (%)     PO Intake %  75 %     Weight  Appropriate weight loss         Nutrition Intervention     Row Name 09/09/21 1022          Nutrition Intervention    RD/Tech Action  Follow Tx progress;Care plan reviewd         Nutrition Prescription     Row Name 09/09/21 1022          Nutrition Prescription PO    PO Prescription  Other (comment) Continue same protocol         Education/Evaluation     Row Name 09/09/21 1022          Education    Education  No discharge needs identified at this time        Monitor/Evaluation    Monitor  Per protocol           Electronically signed by:  Juliet Gallardo RD  09/09/21 10:22 CDT

## 2021-09-09 NOTE — PROGRESS NOTES
BERNARDINO Erazo APRN        Internal Medicine Progress Note    9/9/2021   11:46 CDT    Name:  Reggie Treviño  MRN:    [id number]     Acct:     [id number]   Room:  [address] [place] [address] Day: 0     Admit Date: 9/1/2021  8:21 PM  PCP: Terrell Navas DO    Subjective:     C/C: weakness, cough    Interval History: Status:  stayed the same. Up to chair.  No family at bedside. Tolerating trach with cap. 02 sats stable with 02 at 6 lpm. Afebrile. Rested better and maintained adequate 02 sats overnight with trach collar in place. Blood sugars stable. Still with irritating cough. Counts stable.     Review of Systems   Constitutional: Positive for malaise/fatigue. Negative for chills, decreased appetite, weight gain and weight loss.   HENT: Negative for congestion, ear discharge, hoarse voice and tinnitus.    Eyes: Negative for blurred vision, discharge, visual disturbance and visual halos.   Cardiovascular: Negative for chest pain, claudication, dyspnea on exertion, irregular heartbeat, leg swelling, orthopnea and paroxysmal nocturnal dyspnea.   Respiratory: Positive for cough. Negative for shortness of breath, sputum production and wheezing.    Endocrine: Negative for cold intolerance, heat intolerance and polyuria.   Hematologic/Lymphatic: Negative for adenopathy. Does not bruise/bleed easily.   Skin: Negative for dry skin, itching and suspicious lesions.   Musculoskeletal: Negative for arthritis, back pain, falls, joint pain, muscle weakness and myalgias.   Gastrointestinal: Negative for abdominal pain, constipation, diarrhea, dysphagia and hematemesis.   Genitourinary: Negative for bladder incontinence, dysuria and frequency.   Neurological: Positive for weakness. Negative for aphonia, disturbances in coordination and dizziness.   Psychiatric/Behavioral: Negative for altered mental status, depression, memory loss and substance abuse. The patient does not have insomnia and is not  nervous/anxious.          Medications:     Allergies:   Allergies   Allergen Reactions   • Sulfa Antibiotics Rash       Current Meds:   Current Facility-Administered Medications:   •  acetaminophen (TYLENOL) tablet 500 mg, 500 mg, Oral, Q8H, Shahab Garcia MD  •  albuterol sulfate HFA (PROVENTIL HFA;VENTOLIN HFA;PROAIR HFA) inhaler 6 puff, 6 puff, Inhalation, Q4H PRN, Shahab Garcia MD  •  artificial tears (REFRESH LACRI-LUBE) ophthalmic ointment, , Both Eyes, 4x Daily, Shahab Garcia MD  •  bacitracin ointment, , Topical, Q12H, Shahab Garcia MD  •  cefTAZidime (FORTAZ) 2 g/100 mL 0.9% NS IVPB (mpb), 2 g, Intravenous, Q8H, Shahab Garcia MD  •  chlorhexidine (PERIDEX) 0.12 % solution 15 mL, 15 mL, Mouth/Throat, Q12H, Shahab Garcia MD  •  dextromethorphan polistirex ER (DELSYM) 30 MG/5ML oral suspension 60 mg, 60 mg, Oral, Q12H PRN, Debi Choi APRN  •  dextrose (D50W) 25 g/ 50mL Intravenous Solution 25 g, 25 g, Intravenous, Q15 Min PRN, Shahab Garcia MD  •  dextrose (GLUTOSE) oral gel 15 g, 15 g, Oral, Q15 Min PRN, Shahab Garcia MD  •  docusate (COLACE) 50 MG/5ML liquid 150 mg, 150 mg, Oral, Daily PRN, Shahab Garcia MD  •  famotidine (PEPCID) 40 MG/5ML suspension 20 mg, 20 mg, Oral, Q12H, Shahab Garcia MD  •  glucagon (human recombinant) (GLUCAGEN DIAGNOSTIC) injection 1 mg, 1 mg, Subcutaneous, Q15 Min PRN, Shahab Garcia MD  •  hypromellose (ISOPTO TEARS) 0.5 % ophthalmic solution 2 drop, 2 drop, Both Eyes, Q2H PRN, Shahab Garcia MD  •  ipratropium-albuterol (DUO-NEB) nebulizer solution 3 mL, 3 mL, Nebulization, Q6H - RT, Onofre Yee MD  •  loperamide (IMODIUM) capsule 2 mg, 2 mg, Oral, 4x Daily PRN, Shahab Garcia MD  •  ondansetron (ZOFRAN) injection 4 mg, 4 mg, Intravenous, Q6H PRN **OR** ondansetron (ZOFRAN) tablet 4 mg, 4 mg, Oral, Q6H PRN, Shahab Garcia MD  •  polyethylene  "glycol (MIRALAX) packet 17 g, 17 g, Oral, Q12H PRN, Shahab Garcia MD  •  saccharomyces boulardii (FLORASTOR) capsule 250 mg, 250 mg, Oral, BID, Shahab Garcia MD  •  sodium chloride 0.9 % flush 10 mL, 10 mL, Intravenous, Q12H, Shahab Garcia MD  •  sodium chloride 0.9 % flush 10 mL, 10 mL, Intravenous, PRN, Shahab Garcia MD  •  traZODone (DESYREL) tablet 25 mg, 25 mg, Oral, Nightly PRN, Shahab Garcia MD  •  zolpidem (AMBIEN) tablet 5 mg, 5 mg, Per G Tube, Nightly PRN, Shahab Garcia MD    Data:     Code Status:    There are no questions and answers to display.       No family history on file.    Social History     Socioeconomic History   • Marital status:      Spouse name: Not on file   • Number of children: Not on file   • Years of education: Not on file   • Highest education level: Not on file   Tobacco Use   • Smoking status: Unknown If Ever Smoked       Vitals:  Ht 172.7 cm (68\")   Wt 120 kg (263 lb 14.4 oz) Comment: 9/6/21  BMI 40.13 kg/m²     T 98.7 P 108 R 20 /72 Sp02 96% (6 lpm)        I/O (24Hr):  No intake or output data in the 24 hours ending 09/09/21 1146    Labs and imaging:      Recent Results (from the past 12 hour(s))   Basic Metabolic Panel    Collection Time: 09/09/21  3:55 AM    Specimen: Blood   Result Value Ref Range    Glucose 97 65 - 99 mg/dL    BUN 4 (L) 6 - 20 mg/dL    Creatinine 0.45 (L) 0.76 - 1.27 mg/dL    Sodium 142 136 - 145 mmol/L    Potassium 3.7 3.5 - 5.2 mmol/L    Chloride 102 98 - 107 mmol/L    CO2 33.0 (H) 22.0 - 29.0 mmol/L    Calcium 9.3 8.6 - 10.5 mg/dL    eGFR Non African Amer >150 >60 mL/min/1.73    BUN/Creatinine Ratio 8.9 7.0 - 25.0    Anion Gap 7.0 5.0 - 15.0 mmol/L   CBC Auto Differential    Collection Time: 09/09/21  3:56 AM    Specimen: Blood   Result Value Ref Range    WBC 6.82 3.40 - 10.80 10*3/mm3    RBC 3.46 (L) 4.14 - 5.80 10*6/mm3    Hemoglobin 9.9 (L) 13.0 - 17.7 g/dL    Hematocrit 32.1 (L) 37.5 - " 51.0 %    MCV 92.8 79.0 - 97.0 fL    MCH 28.6 26.6 - 33.0 pg    MCHC 30.8 (L) 31.5 - 35.7 g/dL    RDW 16.6 (H) 12.3 - 15.4 %    RDW-SD 56.4 (H) 37.0 - 54.0 fl    MPV 9.5 6.0 - 12.0 fL    Platelets 292 140 - 450 10*3/mm3    Neutrophil % 67.7 42.7 - 76.0 %    Lymphocyte % 15.0 (L) 19.6 - 45.3 %    Monocyte % 7.8 5.0 - 12.0 %    Eosinophil % 8.8 (H) 0.3 - 6.2 %    Basophil % 0.3 0.0 - 1.5 %    Immature Grans % 0.4 0.0 - 0.5 %    Neutrophils, Absolute 4.62 1.70 - 7.00 10*3/mm3    Lymphocytes, Absolute 1.02 0.70 - 3.10 10*3/mm3    Monocytes, Absolute 0.53 0.10 - 0.90 10*3/mm3    Eosinophils, Absolute 0.60 (H) 0.00 - 0.40 10*3/mm3    Basophils, Absolute 0.02 0.00 - 0.20 10*3/mm3    Immature Grans, Absolute 0.03 0.00 - 0.05 10*3/mm3    nRBC 0.0 0.0 - 0.2 /100 WBC           Physical Examination:        Physical Exam  Vitals and nursing note reviewed.   Constitutional:       Appearance: He is well-developed.   HENT:      Head: Normocephalic and atraumatic.      Right Ear: External ear normal.      Left Ear: External ear normal.      Nose: Nose normal.      Mouth/Throat:      Mouth: Mucous membranes are moist.   Eyes:      Pupils: Pupils are equal, round, and reactive to light.   Neck:      Comments: Trach capped  Cardiovascular:      Rate and Rhythm: Normal rate and regular rhythm.      Heart sounds: Normal heart sounds.   Pulmonary:      Effort: Pulmonary effort is normal.      Breath sounds: Normal breath sounds.   Abdominal:      General: Bowel sounds are normal.      Palpations: Abdomen is soft.      Comments: obese   Musculoskeletal:         General: Normal range of motion.      Cervical back: Normal range of motion and neck supple.      Comments: Generalized weakness   Skin:     General: Skin is warm and dry.   Neurological:      Mental Status: He is alert and oriented to person, place, and time.      Deep Tendon Reflexes: Reflexes are normal and symmetric.   Psychiatric:         Behavior: Behavior normal.            Assessment:             Acute tracheostomy management (CMS/[facility])    Acute hypoxemic respiratory failure due to COVID-19 (CMS/[facility])    Obesity, morbid (CMS/[facility])    TODD (obstructive sleep apnea)    Past Medical History:   Diagnosis Date   • Kidney stone         Plan:        1. Acute hypoxic respiratory failure secondary to COVID 19  2. Dysphagia - improved  3. Bilateral chemosis  4. Stenotrophomonas pneumonia  5. Morbid obesity  6. PTSD  7. Hyperglycemia    Continue current treatment. Monitor counts. Increase activity. Labs Monday.  Aggressive therapies as tolerated. Oxygen weaning as tolerated. Glycemic control. Maintain patient safety. Trach management per ENT. DC telemetry. Add tussionex prn.       Electronically signed by SRIKANTH Fulton on 9/9/2021 at 11:46 CDT

## 2021-09-10 PROCEDURE — 97535 SELF CARE MNGMENT TRAINING: CPT

## 2021-09-10 PROCEDURE — 31502 CHANGE OF WINDPIPE AIRWAY: CPT | Performed by: OTOLARYNGOLOGY

## 2021-09-10 PROCEDURE — 99232 SBSQ HOSP IP/OBS MODERATE 35: CPT | Performed by: OTOLARYNGOLOGY

## 2021-09-10 PROCEDURE — 31615 TRCHEOBRNCHSC EST TRACHS INC: CPT | Performed by: OTOLARYNGOLOGY

## 2021-09-10 PROCEDURE — 31575 DIAGNOSTIC LARYNGOSCOPY: CPT | Performed by: OTOLARYNGOLOGY

## 2021-09-10 PROCEDURE — 97116 GAIT TRAINING THERAPY: CPT

## 2021-09-10 NOTE — PROGRESS NOTES
[facility] Otolaryngology Head and Neck Surgery  INPATIENT PROGRESS NOTE    Patient Name: Reggie Treviño  : 1985   MRN: [id number]  Date of Admission: 2021  Today's Date: 09/10/21  Length of Stay: 0  574/1    Shahab Garcia MD   Primary Care Physician: Terrell Navas,   Surgical Procedures Since Admission:    Subjective   Subjective   Chief Complaint: Trach management  HPI   Accompanied by: No one  Since last examined, Reggie Treviño has remove tracheostomy in position.  He has tolerated intermittent capping and his current trach very well.  He is able to vocalize quite well.  RT reports no issues with the trach.  Patient seen, chart is reviewed    Review of Systems   No change from prior inquiry  All pertinent negatives and positives are as above. All other systems have been reviewed and are negative unless otherwise stated.   Objective   Objective   Vitals:        Physical Exam  Constitutional:       General: He is awake. He is not in acute distress.     Appearance: He is well-developed. He is obese.      Interventions: He is intubated.      Comments: Trach in position, capped today   HENT:      Head: Normocephalic and atraumatic.      Comments: Face-adiposis     Right Ear: Hearing and external ear normal.      Left Ear: Hearing and external ear normal.      Nose: Nose normal.      Mouth/Throat:      Mouth: Mucous membranes are dry. No injury.      Dentition: Normal dentition. No gum lesions.      Tongue: No lesions. Tongue does not deviate from midline.      Comments: Macroglossia-severe  Prolapse-severe  OP exam deferred  Eyes:      General: Lids are normal. Gaze aligned appropriately.      Extraocular Movements: Extraocular movements intact.      Conjunctiva/sclera: Conjunctivae normal.   Neck:      Comments: Larynx-very low    TRACHEOSTOMY SITE:    Tracheostomy tube type: Shiley #6 cuffed DIC XLT P    Stoma: Healing well    Passey-Gonzalez valve: Tolerating well     Voice: Good with In position, somewhat breathy  Date placed: 7/30/2021  Date last changed: 9/10/2021, Shiley uncuffed #6 DIC flexible shaft  See scope procedure for details  Pulmonary:      Effort: He is intubated.      Comments: Trach in position, capped  Musculoskeletal:      Cervical back: Decreased range of motion.   Neurological:      Mental Status: He is alert and oriented to person, place, and time.      Cranial Nerves: Cranial nerves are intact.   Psychiatric:         Attention and Perception: Attention and perception normal.         Mood and Affect: Mood and affect normal.         Behavior: Behavior is cooperative.         Cognition and Memory: Cognition and memory normal.           Result Review    Result Review:  I have personally reviewed the results from the time of this admission to 9/10/2021 14:16 CDT and agree with these findings:  []  Laboratory  []  Microbiology  []  Radiology  []  EKG/Telemetry   []  Cardiology/Vascular   []  Pathology  []  Old records  []  Other:  Most notable findings include: None pertinent to ENT today    Assessment/Plan   Assessment / Plan   Brief Patient Summary:  Reggie Treviño is a 36 y.o. male who has tolerated trach capping well.  He continues with a trach in position.  He requires trach and capping at night to treat probable hypoventilation obesity syndrome.    Active Hospital Problems:   Active Hospital Problems    Diagnosis    • Acute tracheostomy management (CMS/[facility])    • Acute hypoxemic respiratory failure due to COVID-19 (CMS/[facility])    • Obesity, morbid (CMS/[facility])    • TODD (obstructive sleep apnea)      Plan:   • Trach management-the patient has tolerated trach weaning well.  I wean him to a Shiley #6 uncuffed DIC.  I will continue this in position for treatment of hypoventilation obesity syndrome.  Continue current trach care.  • Hypoventilation obesity syndrome-I will treat this with tracheostomy tube for now.  • Respiratory failure-patient continues to improve.   Followed by primary team.  Discussed Plan with patient, RT  Following patient as in-patient. Further recommendations will be made based on serial examinations.    Medications/Orders for this encounter: No new medications ordered.  Orders-  Intermittent capping     Discharge Planning: Per primary team        DVT prophylaxis:  No DVT prophylaxis order currently exists.     Electronically signed by Davin David Jr, MD, 09/10/21, 2:16 PM CDT.

## 2021-09-10 NOTE — PROGRESS NOTES
Jose Garcia M.D.  SRIKANTH Delgado        Internal Medicine Progress Note    9/10/2021   12:20 CDT    Name:  Reggie Treviño  MRN:    [id number]     Acct:     [id number]   Room:  [address] [place] [address] Day: 0     Admit Date: 9/1/2021  8:21 PM  PCP: Terrell Navas DO    Subjective:     C/C: weakness, cough    Interval History: Status:  stayed the same. Up to chair.  No family at bedside. Tolerating trach with cap. 02 sats stable with 02 at 6 lpm. Afebrile. Rested better and maintained adequate 02 sats overnight with trach collar in place. Progressing with therapy.  Blood sugars stable. Still with irritating cough- tussionex ordered yesterday, but patient has not taken. Anticipating trach downsize today.     Review of Systems   Constitutional: Positive for malaise/fatigue. Negative for chills, decreased appetite, weight gain and weight loss.   HENT: Negative for congestion, ear discharge, hoarse voice and tinnitus.    Eyes: Negative for blurred vision, discharge, visual disturbance and visual halos.   Cardiovascular: Negative for chest pain, claudication, dyspnea on exertion, irregular heartbeat, leg swelling, orthopnea and paroxysmal nocturnal dyspnea.   Respiratory: Positive for cough. Negative for shortness of breath, sputum production and wheezing.    Endocrine: Negative for cold intolerance, heat intolerance and polyuria.   Hematologic/Lymphatic: Negative for adenopathy. Does not bruise/bleed easily.   Skin: Negative for dry skin, itching and suspicious lesions.   Musculoskeletal: Negative for arthritis, back pain, falls, joint pain, muscle weakness and myalgias.   Gastrointestinal: Negative for abdominal pain, constipation, diarrhea, dysphagia and hematemesis.   Genitourinary: Negative for bladder incontinence, dysuria and frequency.   Neurological: Positive for weakness. Negative for aphonia, disturbances in coordination and dizziness.   Psychiatric/Behavioral: Negative for altered mental  status, depression, memory loss and substance abuse. The patient does not have insomnia and is not nervous/anxious.          Medications:     Allergies:   Allergies   Allergen Reactions   • Sulfa Antibiotics Rash       Current Meds:   Current Facility-Administered Medications:   •  acetaminophen (TYLENOL) tablet 500 mg, 500 mg, Oral, Q8H, Shahab Garcia MD  •  albuterol sulfate HFA (PROVENTIL HFA;VENTOLIN HFA;PROAIR HFA) inhaler 6 puff, 6 puff, Inhalation, Q4H PRN, Shahab Garcia MD  •  artificial tears (REFRESH LACRI-LUBE) ophthalmic ointment, , Both Eyes, 4x Daily, Shahab Garcia MD  •  bacitracin ointment, , Topical, Q12H, Shahab Garcia MD  •  dextromethorphan polistirex ER (DELSYM) 30 MG/5ML oral suspension 60 mg, 60 mg, Oral, Q12H PRN, Debi Choi, APRN  •  dextrose (D50W) 25 g/ 50mL Intravenous Solution 25 g, 25 g, Intravenous, Q15 Min PRN, Shahab Garcia MD  •  dextrose (GLUTOSE) oral gel 15 g, 15 g, Oral, Q15 Min PRN, Shahab Garcia MD  •  docusate (COLACE) 50 MG/5ML liquid 150 mg, 150 mg, Oral, Daily PRN, Shahab Garcia MD  •  famotidine (PEPCID) 40 MG/5ML suspension 20 mg, 20 mg, Oral, Q12H, Shahab Garcia MD  •  glucagon (human recombinant) (GLUCAGEN DIAGNOSTIC) injection 1 mg, 1 mg, Subcutaneous, Q15 Min PRN, Shahab Garcia MD  •  HYDROcod Polst-CPM Polst ER (TUSSIONEX PENNKINETIC) 10-8 MG/5ML ER suspension 5 mL, 5 mL, Oral, Q12H PRN, Debi Choi APRN  •  hypromellose (ISOPTO TEARS) 0.5 % ophthalmic solution 2 drop, 2 drop, Both Eyes, Q2H PRN, Shahab Garcia MD  •  ipratropium-albuterol (DUO-NEB) nebulizer solution 3 mL, 3 mL, Nebulization, Q6H - RT, Onofre Yee MD  •  loperamide (IMODIUM) capsule 2 mg, 2 mg, Oral, 4x Daily PRN, Shahab Garcia MD  •  ondansetron (ZOFRAN) injection 4 mg, 4 mg, Intravenous, Q6H PRN **OR** ondansetron (ZOFRAN) tablet 4 mg, 4 mg, Oral, Q6H PRN, Shahab Garcia  "MD Sage  •  polyethylene glycol (MIRALAX) packet 17 g, 17 g, Oral, Q12H PRN, Shahab Garcia MD  •  saccharomyces boulardii (FLORASTOR) capsule 250 mg, 250 mg, Oral, BID, Shahab Garcia MD  •  sodium chloride 0.9 % flush 10 mL, 10 mL, Intravenous, Q12H, Shahab Garcia MD  •  sodium chloride 0.9 % flush 10 mL, 10 mL, Intravenous, PRN, Shahab Garcia MD  •  traZODone (DESYREL) tablet 25 mg, 25 mg, Oral, Nightly PRN, Shahab Garcia MD  •  zolpidem (AMBIEN) tablet 5 mg, 5 mg, Per G Tube, Nightly PRN, Shahab Garcia MD    Data:     Code Status:    There are no questions and answers to display.       No family history on file.    Social History     Socioeconomic History   • Marital status:      Spouse name: Not on file   • Number of children: Not on file   • Years of education: Not on file   • Highest education level: Not on file   Tobacco Use   • Smoking status: Unknown If Ever Smoked       Vitals:  Ht 172.7 cm (68\")   Wt 120 kg (263 lb 14.4 oz) Comment: 9/6/21  BMI 40.13 kg/m²     T 97.8 P 100 R 24 /80 Sp02 98% (6 lpm)        I/O (24Hr):  No intake or output data in the 24 hours ending 09/10/21 1220    Labs and imaging:      No results found for this or any previous visit (from the past 12 hour(s)).        Physical Examination:        Physical Exam  Vitals and nursing note reviewed.   Constitutional:       Appearance: He is well-developed.   HENT:      Head: Normocephalic and atraumatic.      Right Ear: External ear normal.      Left Ear: External ear normal.      Nose: Nose normal.      Mouth/Throat:      Mouth: Mucous membranes are moist.   Eyes:      Pupils: Pupils are equal, round, and reactive to light.   Neck:      Comments: Trach capped  Cardiovascular:      Rate and Rhythm: Normal rate and regular rhythm.      Heart sounds: Normal heart sounds.   Pulmonary:      Effort: Pulmonary effort is normal.      Breath sounds: Normal breath sounds. "   Abdominal:      General: Bowel sounds are normal.      Palpations: Abdomen is soft.      Comments: obese   Musculoskeletal:         General: Normal range of motion.      Cervical back: Normal range of motion and neck supple.      Comments: Generalized weakness   Skin:     General: Skin is warm and dry.   Neurological:      Mental Status: He is alert and oriented to person, place, and time.      Deep Tendon Reflexes: Reflexes are normal and symmetric.   Psychiatric:         Behavior: Behavior normal.           Assessment:             Acute tracheostomy management (CMS/[facility])    Acute hypoxemic respiratory failure due to COVID-19 (CMS/[facility])    Obesity, morbid (CMS/[facility])    TODD (obstructive sleep apnea)    Past Medical History:   Diagnosis Date   • Kidney stone         Plan:        1. Acute hypoxic respiratory failure secondary to COVID 19  2. Dysphagia - improved  3. Bilateral chemosis  4. Stenotrophomonas pneumonia  5. Morbid obesity  6. PTSD  7. Hyperglycemia    Continue current treatment. Monitor counts. Increase activity. Labs Monday.  Aggressive therapies as tolerated. Oxygen weaning as tolerated. Glycemic control. Maintain patient safety. Trach management per ENT. Cough suppressants as needed. Trach management per ENT.       Electronically signed by SRIKANTH Fulton on 9/10/2021 at 12:20 CDT     I have discussed the care of Reggie Treviño, including pertinent history and exam findings, with the nurse practitioner.    I have seen and examined the patient and the key elements of all parts of the encounter have been performed by me.  I agree with the assessment, plan and orders as documented by SRIKANTH Delgado, after I modified the exam findings and the plan of treatments and the final version is my approved version of the assessment.        Electronically signed by Shahab Garcia MD on 9/10/2021 at 16:45 CDT

## 2021-09-10 NOTE — PROCEDURES
[facility] Otolaryngology Head and Neck Surgery  PROCEDURE NOTE    Anesthesia: none    Indications for Procedure:     Acute tracheostomy management (CMS/[facility])    Acute hypoxemic respiratory failure due to COVID-19 (CMS/[facility])    Obesity, morbid (CMS/[facility])    TODD (obstructive sleep apnea)       Endoscopy Type: Flexible Laryngoscopy    Procedure Details:    The patient was placed in an upright position.  The laryngoscope was passed right nasal passge.  The nasal cavities, nasopharynx, oropharynx, hypopharynx, and larynx were all examined.  Vocal cords were examined during respiration and phonation.  The following findings were noted:    Findings:   LARYNGOSCOPY FINDINGS :    NASOPHARYNX:     adenoids  Flat, no lesions, Eustachian tubes normal, without lesions, palate with normal mobility without lesions.  OROPHARYNX:     Lateral walls:         Redundant          BILATERAL: Moderate    Posterior walls:        Thick, mildly red    BASEOF TONGUE:          prolapse  severe    LINGUAL TONSILS:          BILATERAL: Moderately enlarged    VALLECULA:         normal, no lesions, no pooling  Bilateral  EPIGLOTTIS:    Position: Mildly retroverted    Color: Red    Mucosa: Mildly edematous, no lesions    Shape: Normal  HYPOPHARYNX:    Lateral walls:         BILATERAL: Redundant, mildly red    Posterior wall:        Red, thickened  ARYEPIGLOTTIC FOLDS:     Abnormal:        BILATERAL: Red, thickened  ARYTENOIDS:     Mobility:       Normal   Bilateral    [place] size:         BILATERAL: Mild to moderately enlarged  FALSE CORDS:     normal mucosa, no lesions, normal mobility  Bilateral  TRUE VOCAL FOLDS:      normal appearance, mobility, no lesions  Bilateral  GLOTTIS:     abnormal: Mildly weak full forced closure  SUBGLOTTIS:     unobstructed, patent, no lesions    Condition:  Stable.  Patient tolerated procedure well.    Complications:  None    Endoscopy Type: Flexible Tracheobronchoscopy    Indications for Procedure:    Tracheostomy evaluation  Trachea evaluation    Procedure Details:    The patient was placed in the sitting position.  The SCOPE TYPE: Flexible laryngoscope was passed both via the tracheostome and the tracheostomy tube .  The trachea from the subglottis to the terese was examined.  A retrograde examination of the Vocal cords and subglottis was carried out during respiration and phonation.  The following findings were noted:    Findings:   TRACHEO-BRONCHOSCOPY:    Stoma: Long tract, healing appropriately   Distal Trachea: Mildly red, no excess secretions, terese sharp, MSB patent   Proximal trachea: Mildly red, no secretions, no scarring   True Vocal cords: Symmetric excursion, mild weakness of full forced closure   Subglottis: Intact, no scarring, no penetration of secretions     Condition:  Stable.  Patient tolerated procedure well.    Complications:  None    Procedure:  Tracheostomy tube change    Procedure Details:    The patient's respiratory status was assessed.  The trach tube in position was assessed.  The patient was positioned.  The trach tube was removed without difficulty. The stoma and trachea were inspected.    A new tracheostomy tube was re- inserted. Trach tube position was confirmed with scope. Trach tube type:  Shiley trach tube Size  6 Uncuffed Flexible shaft    Findings: See above    Condition:  Stable.  Patient tolerated procedure well.    Complications:  None    Post-procedure instructions reviewed with Patient, RT     Electronically signed by Davin David Jr, MD, 09/10/21, 2:16 PM CDT.

## 2021-09-11 PROCEDURE — 97116 GAIT TRAINING THERAPY: CPT

## 2021-09-11 RX ORDER — IPRATROPIUM BROMIDE AND ALBUTEROL SULFATE 2.5; .5 MG/3ML; MG/3ML
3 SOLUTION RESPIRATORY (INHALATION)
Status: DISCONTINUED | OUTPATIENT
Start: 2021-09-11 | End: 2021-09-11

## 2021-09-11 RX ORDER — IPRATROPIUM BROMIDE AND ALBUTEROL SULFATE 2.5; .5 MG/3ML; MG/3ML
3 SOLUTION RESPIRATORY (INHALATION) EVERY 6 HOURS PRN
Status: DISCONTINUED | OUTPATIENT
Start: 2021-09-11 | End: 2021-09-23 | Stop reason: HOSPADM

## 2021-09-11 NOTE — PROGRESS NOTES
BERNARDINO Erazo APRN        Internal Medicine Progress Note    9/11/2021   09:44 CDT    Name:  Reggie Treviño  MRN:    [id number]     Acct:     [id number]   Room:  [address] [place] [address] Day: 0     Admit Date: 9/1/2021  8:21 PM  PCP: Terrell Navas DO    Subjective:     C/C: weakness, cough    Interval History: Status:  stayed the same.  Walked in the correa with PT and now up in chair.  No family at bedside.  Continues to require 6 lpm per nasal cannula.  Tolerating trach being capped.  Afebrile. Voiced concern of increased heart rate, in the 110's, denies chest pain. Progressing with therapy. Blood sugar stable. Progressing with therapy.  Blood sugars stable.      Review of Systems   Constitutional: Positive for malaise/fatigue. Negative for chills, decreased appetite, weight gain and weight loss.   HENT: Negative for congestion, ear discharge, hoarse voice and tinnitus.    Eyes: Negative for blurred vision, discharge, visual disturbance and visual halos.   Cardiovascular: Negative for chest pain, claudication, dyspnea on exertion, irregular heartbeat, leg swelling, orthopnea and paroxysmal nocturnal dyspnea.   Respiratory: Positive for cough. Negative for shortness of breath, sputum production and wheezing.    Endocrine: Negative for cold intolerance, heat intolerance and polyuria.   Hematologic/Lymphatic: Negative for adenopathy. Does not bruise/bleed easily.   Skin: Negative for dry skin, itching and suspicious lesions.   Musculoskeletal: Negative for arthritis, back pain, falls, joint pain, muscle weakness and myalgias.   Gastrointestinal: Negative for abdominal pain, constipation, diarrhea, dysphagia and hematemesis.   Genitourinary: Negative for bladder incontinence, dysuria and frequency.   Neurological: Positive for weakness. Negative for aphonia, disturbances in coordination and dizziness.   Psychiatric/Behavioral: Negative for altered mental status, depression, memory  loss and substance abuse. The patient does not have insomnia and is not nervous/anxious.          Medications:     Allergies:   Allergies   Allergen Reactions   • Sulfa Antibiotics Rash       Current Meds:   Current Facility-Administered Medications:   •  acetaminophen (TYLENOL) tablet 500 mg, 500 mg, Oral, Q8H, Shahab Garcia MD  •  albuterol sulfate HFA (PROVENTIL HFA;VENTOLIN HFA;PROAIR HFA) inhaler 6 puff, 6 puff, Inhalation, Q4H PRN, Shahab Garcia MD  •  artificial tears (REFRESH LACRI-LUBE) ophthalmic ointment, , Both Eyes, 4x Daily, Shahab Garcia MD  •  bacitracin ointment, , Topical, Q12H, Shahab Garcia MD  •  dextromethorphan polistirex ER (DELSYM) 30 MG/5ML oral suspension 60 mg, 60 mg, Oral, Q12H PRN, Debi Choi APRN  •  dextrose (D50W) 25 g/ 50mL Intravenous Solution 25 g, 25 g, Intravenous, Q15 Min PRN, Shahab Garcia MD  •  dextrose (GLUTOSE) oral gel 15 g, 15 g, Oral, Q15 Min PRN, Shahab Garcia MD  •  docusate (COLACE) 50 MG/5ML liquid 150 mg, 150 mg, Oral, Daily PRN, Shahab Garcia MD  •  famotidine (PEPCID) 40 MG/5ML suspension 20 mg, 20 mg, Oral, Q12H, Shahab Garcia MD  •  glucagon (human recombinant) (GLUCAGEN DIAGNOSTIC) injection 1 mg, 1 mg, Subcutaneous, Q15 Min PRN, Shahab Garcia MD  •  HYDROcod Polst-CPM Polst ER (TUSSIONEX PENNKINETIC) 10-8 MG/5ML ER suspension 5 mL, 5 mL, Oral, Q12H PRN, Debi Choi APRN  •  hypromellose (ISOPTO TEARS) 0.5 % ophthalmic solution 2 drop, 2 drop, Both Eyes, Q2H PRN, Shahab Garcia MD  •  ipratropium-albuterol (DUO-NEB) nebulizer solution 3 mL, 3 mL, Nebulization, Q6H - RT, Onofre Yee MD  •  loperamide (IMODIUM) capsule 2 mg, 2 mg, Oral, 4x Daily PRN, Shahab Garcia MD  •  ondansetron (ZOFRAN) injection 4 mg, 4 mg, Intravenous, Q6H PRN **OR** ondansetron (ZOFRAN) tablet 4 mg, 4 mg, Oral, Q6H PRN, Shahab Garcia MD  •  polyethylene  "glycol (MIRALAX) packet 17 g, 17 g, Oral, Q12H PRN, Shahab Garcia MD  •  saccharomyces boulardii (FLORASTOR) capsule 250 mg, 250 mg, Oral, BID, Shahab Garcia MD  •  sodium chloride 0.9 % flush 10 mL, 10 mL, Intravenous, Q12H, Shahab Garcia MD  •  sodium chloride 0.9 % flush 10 mL, 10 mL, Intravenous, PRN, Shahab Garcia MD  •  traZODone (DESYREL) tablet 25 mg, 25 mg, Oral, Nightly PRN, Shahab Garcia MD  •  zolpidem (AMBIEN) tablet 5 mg, 5 mg, Per G Tube, Nightly PRN, Shahab Garcia MD    Data:     Code Status:    There are no questions and answers to display.       No family history on file.    Social History     Socioeconomic History   • Marital status:      Spouse name: Not on file   • Number of children: Not on file   • Years of education: Not on file   • Highest education level: Not on file   Tobacco Use   • Smoking status: Unknown If Ever Smoked       Vitals:  Ht 172.7 cm (68\")   Wt 120 kg (263 lb 14.4 oz) Comment: 9/6/21  BMI 40.13 kg/m²     T 98.4 P 113 R 30 /63 Sp02 99% (6 lpm)        I/O (24Hr):  No intake or output data in the 24 hours ending 09/11/21 0944    Labs and imaging:      No results found for this or any previous visit (from the past 12 hour(s)).        Physical Examination:        Physical Exam  Vitals and nursing note reviewed.   Constitutional:       Appearance: He is well-developed.   HENT:      Head: Normocephalic and atraumatic.      Right Ear: External ear normal.      Left Ear: External ear normal.      Nose: Nose normal.      Mouth/Throat:      Mouth: Mucous membranes are moist.   Eyes:      Pupils: Pupils are equal, round, and reactive to light.   Neck:      Comments: Trach capped  Cardiovascular:      Rate and Rhythm: Normal rate and regular rhythm.      Heart sounds: Normal heart sounds.   Pulmonary:      Effort: Pulmonary effort is normal.      Breath sounds: Normal breath sounds.   Abdominal:      General: Bowel " sounds are normal.      Palpations: Abdomen is soft.      Comments: obese   Musculoskeletal:         General: Normal range of motion.      Cervical back: Normal range of motion and neck supple.      Comments: Generalized weakness   Skin:     General: Skin is warm and dry.   Neurological:      Mental Status: He is alert and oriented to person, place, and time.      Deep Tendon Reflexes: Reflexes are normal and symmetric.   Psychiatric:         Behavior: Behavior normal.           Assessment:             Acute tracheostomy management (CMS/[facility])    Acute hypoxemic respiratory failure due to COVID-19 (CMS/[facility])    Obesity, morbid (CMS/[facility])    TODD (obstructive sleep apnea)    Past Medical History:   Diagnosis Date   • Kidney stone         Plan:        1. Acute hypoxic respiratory failure secondary to COVID 19  2. Dysphagia - improved  3. Bilateral chemosis  4. Stenotrophomonas pneumonia  5. Morbid obesity  6. PTSD  7. Hyperglycemia    Continue current treatment. Monitor counts. Increase activity. Labs Monday.  Aggressive therapies as tolerated. Oxygen weaning as tolerated. Glycemic control. Maintain patient safety. Trach management per ENT. Cough suppressants as needed. Trach management per ENT.       Electronically signed by SRIKANTH Howard on 9/11/2021 at 09:44 CDT      Improved

## 2021-09-12 RX ORDER — FAMOTIDINE 20 MG/1
20 TABLET, FILM COATED ORAL EVERY 12 HOURS SCHEDULED
Status: DISCONTINUED | OUTPATIENT
Start: 2021-09-13 | End: 2021-09-23 | Stop reason: HOSPADM

## 2021-09-12 RX ORDER — ACETAMINOPHEN 500 MG
500 TABLET ORAL EVERY 8 HOURS PRN
Status: DISCONTINUED | OUTPATIENT
Start: 2021-09-12 | End: 2021-09-23 | Stop reason: HOSPADM

## 2021-09-12 NOTE — PROGRESS NOTES
Jose Garcia M.D.  SRIKANTH Delgado        Internal Medicine Progress Note    9/12/2021   09:51 CDT    Name:  Reggie Treviño  MRN:    [id number]     Acct:     [id number]   Room:  [address] [place] [address] Day: 0     Admit Date: 9/1/2021  8:21 PM  PCP: Terrell Navas DO    Subjective:     C/C: weakness, cough    Interval History: Status:  stayed the same.  Resting in chair.  No family at bedside.  Continues on 6 lpm.  Tolerating trach being capped well.  Afebrile.  Continues to have episodes of occasional diarrhea that is overall controlled with Imodium.  Progressing well with therapy.  Glucose stable.  No new concerns voiced or identified    Review of Systems   Constitutional: Positive for malaise/fatigue. Negative for chills, decreased appetite, weight gain and weight loss.   HENT: Negative for congestion, ear discharge, hoarse voice and tinnitus.    Eyes: Negative for blurred vision, discharge, visual disturbance and visual halos.   Cardiovascular: Negative for chest pain, claudication, dyspnea on exertion, irregular heartbeat, leg swelling, orthopnea and paroxysmal nocturnal dyspnea.   Respiratory: Positive for cough. Negative for shortness of breath, sputum production and wheezing.    Endocrine: Negative for cold intolerance, heat intolerance and polyuria.   Hematologic/Lymphatic: Negative for adenopathy. Does not bruise/bleed easily.   Skin: Negative for dry skin, itching and suspicious lesions.   Musculoskeletal: Negative for arthritis, back pain, falls, joint pain, muscle weakness and myalgias.   Gastrointestinal: Negative for abdominal pain, constipation, diarrhea, dysphagia and hematemesis.   Genitourinary: Negative for bladder incontinence, dysuria and frequency.   Neurological: Positive for weakness. Negative for aphonia, disturbances in coordination and dizziness.   Psychiatric/Behavioral: Negative for altered mental status, depression, memory loss and substance abuse. The patient does  not have insomnia and is not nervous/anxious.          Medications:     Allergies:   Allergies   Allergen Reactions    Sulfa Antibiotics Rash       Current Meds:   Current Facility-Administered Medications:     acetaminophen (TYLENOL) tablet 500 mg, 500 mg, Oral, Q8H, Shahab Garica MD    albuterol sulfate HFA (PROVENTIL HFA;VENTOLIN HFA;PROAIR HFA) inhaler 6 puff, 6 puff, Inhalation, Q4H PRN, Shahab Garcia MD    artificial tears (REFRESH LACRI-LUBE) ophthalmic ointment, , Both Eyes, 4x Daily, Shahab Garcia MD    bacitracin ointment, , Topical, Q12H, Shahab Garcia MD    dextromethorphan polistirex ER (DELSYM) 30 MG/5ML oral suspension 60 mg, 60 mg, Oral, Q12H PRN, Debi Choi APRN    dextrose (D50W) 25 g/ 50mL Intravenous Solution 25 g, 25 g, Intravenous, Q15 Min PRN, Shahab Garcia MD    dextrose (GLUTOSE) oral gel 15 g, 15 g, Oral, Q15 Min PRN, Shahab Garcia MD    docusate (COLACE) 50 MG/5ML liquid 150 mg, 150 mg, Oral, Daily PRN, Shahab Garcia MD    famotidine (PEPCID) 40 MG/5ML suspension 20 mg, 20 mg, Oral, Q12H, Shahab Garcia MD    glucagon (human recombinant) (GLUCAGEN DIAGNOSTIC) injection 1 mg, 1 mg, Subcutaneous, Q15 Min PRN, Shahab Garcia MD    HYDROcod Polst-CPM Polst ER (TUSSIONEX PENNKINETIC) 10-8 MG/5ML ER suspension 5 mL, 5 mL, Oral, Q12H PRN, Debi Choi APRN    hypromellose (ISOPTO TEARS) 0.5 % ophthalmic solution 2 drop, 2 drop, Both Eyes, Q2H PRN, Shahab Garcia MD    ipratropium-albuterol (DUO-NEB) nebulizer solution 3 mL, 3 mL, Nebulization, Q6H PRN, Shahab Garcia MD    loperamide (IMODIUM) capsule 2 mg, 2 mg, Oral, 4x Daily PRN, Shahab Garcia MD    ondansetron (ZOFRAN) injection 4 mg, 4 mg, Intravenous, Q6H PRN **OR** ondansetron (ZOFRAN) tablet 4 mg, 4 mg, Oral, Q6H PRN, Shahab Garcia MD    polyethylene glycol (MIRALAX) packet 17 g, 17 g, Oral, Q12H PRN,  "Shahab Garcia MD    saccharomyces boulardii (FLORASTOR) capsule 250 mg, 250 mg, Oral, BID, Shahab Garcia MD    sodium chloride 0.9 % flush 10 mL, 10 mL, Intravenous, Q12H, Shahab Garcia MD    sodium chloride 0.9 % flush 10 mL, 10 mL, Intravenous, PRN, Shahab Garcia MD    traZODone (DESYREL) tablet 25 mg, 25 mg, Oral, Nightly PRN, Shahab Garcia MD    zolpidem (AMBIEN) tablet 5 mg, 5 mg, Per G Tube, Nightly PRN, Shahab Garcia MD    Data:     Code Status:    There are no questions and answers to display.       No family history on file.    Social History     Socioeconomic History    Marital status:      Spouse name: Not on file    Number of children: Not on file    Years of education: Not on file    Highest education level: Not on file   Tobacco Use    Smoking status: Unknown If Ever Smoked       Vitals:  Ht 172.7 cm (68\")   Wt 120 kg (263 lb 14.4 oz) Comment: 9/6/21  BMI 40.13 kg/m²     T 98.1 P 95 R 18 /75 Sp02 97% (6 lpm)        I/O (24Hr):  No intake or output data in the 24 hours ending 09/12/21 0951    Labs and imaging:      No results found for this or any previous visit (from the past 12 hour(s)).        Physical Examination:        Physical Exam  Vitals and nursing note reviewed.   Constitutional:       Appearance: He is well-developed.   HENT:      Head: Normocephalic and atraumatic.      Right Ear: External ear normal.      Left Ear: External ear normal.      Nose: Nose normal.      Mouth/Throat:      Mouth: Mucous membranes are moist.   Eyes:      Pupils: Pupils are equal, round, and reactive to light.   Neck:      Comments: Trach capped  Cardiovascular:      Rate and Rhythm: Normal rate and regular rhythm.      Heart sounds: Normal heart sounds.   Pulmonary:      Effort: Pulmonary effort is normal.      Breath sounds: Normal breath sounds.   Abdominal:      General: Bowel sounds are normal.      Palpations: Abdomen is soft.      Comments: " obese   Musculoskeletal:         General: Normal range of motion.      Cervical back: Normal range of motion and neck supple.      Comments: Generalized weakness   Skin:     General: Skin is warm and dry.   Neurological:      Mental Status: He is alert and oriented to person, place, and time.      Deep Tendon Reflexes: Reflexes are normal and symmetric.   Psychiatric:         Behavior: Behavior normal.           Assessment:             Acute tracheostomy management (CMS/[facility])    Acute hypoxemic respiratory failure due to COVID-19 (CMS/[facility])    Obesity, morbid (CMS/[facility])    TODD (obstructive sleep apnea)    Past Medical History:   Diagnosis Date    Kidney stone         Plan:        Acute hypoxic respiratory failure secondary to COVID 19  Dysphagia - improved  Bilateral chemosis  Stenotrophomonas pneumonia  Morbid obesity  PTSD  Hyperglycemia    Continue current treatment. Monitor counts. Increase activity. Labs Monday.  Aggressive therapies as tolerated. Oxygen weaning as tolerated. Glycemic control. Maintain patient safety. Trach management per ENT. Cough suppressants as needed. Trach management per ENT.       Electronically signed by SRIKANTH Howard on 9/12/2021 at 09:51 CDT   I have discussed the care of Reggie Treviño, including pertinent history and exam findings, with the nurse practitioner.    I have seen and examined the patient and the key elements of all parts of the encounter have been performed by me.  I agree with the assessment, plan and orders as documented by SRIKANTH Holman, after I modified the exam findings and the plan of treatments and the final version is my approved version of the assessment.        Electronically signed by Shahab Garcia MD on 9/12/2021 at 18:44 CDT

## 2021-09-13 LAB
ANION GAP SERPL CALCULATED.3IONS-SCNC: 7 MMOL/L (ref 5–15)
BASOPHILS # BLD AUTO: 0.03 10*3/MM3 (ref 0–0.2)
BASOPHILS NFR BLD AUTO: 0.5 % (ref 0–1.5)
BUN SERPL-MCNC: 6 MG/DL (ref 6–20)
BUN/CREAT SERPL: 13.6 (ref 7–25)
CALCIUM SPEC-SCNC: 9.2 MG/DL (ref 8.6–10.5)
CHLORIDE SERPL-SCNC: 100 MMOL/L (ref 98–107)
CO2 SERPL-SCNC: 33 MMOL/L (ref 22–29)
CREAT SERPL-MCNC: 0.44 MG/DL (ref 0.76–1.27)
DEPRECATED RDW RBC AUTO: 51 FL (ref 37–54)
EOSINOPHIL # BLD AUTO: 0.5 10*3/MM3 (ref 0–0.4)
EOSINOPHIL NFR BLD AUTO: 8 % (ref 0.3–6.2)
ERYTHROCYTE [DISTWIDTH] IN BLOOD BY AUTOMATED COUNT: 15.6 % (ref 12.3–15.4)
GFR SERPL CREATININE-BSD FRML MDRD: >150 ML/MIN/1.73
GLUCOSE SERPL-MCNC: 94 MG/DL (ref 65–99)
HCT VFR BLD AUTO: 32.9 % (ref 37.5–51)
HGB BLD-MCNC: 10.7 G/DL (ref 13–17.7)
IMM GRANULOCYTES # BLD AUTO: 0.08 10*3/MM3 (ref 0–0.05)
IMM GRANULOCYTES NFR BLD AUTO: 1.3 % (ref 0–0.5)
LYMPHOCYTES # BLD AUTO: 1.4 10*3/MM3 (ref 0.7–3.1)
LYMPHOCYTES NFR BLD AUTO: 22.5 % (ref 19.6–45.3)
MCH RBC QN AUTO: 29 PG (ref 26.6–33)
MCHC RBC AUTO-ENTMCNC: 32.5 G/DL (ref 31.5–35.7)
MCV RBC AUTO: 89.2 FL (ref 79–97)
MONOCYTES # BLD AUTO: 0.63 10*3/MM3 (ref 0.1–0.9)
MONOCYTES NFR BLD AUTO: 10.1 % (ref 5–12)
NEUTROPHILS NFR BLD AUTO: 3.58 10*3/MM3 (ref 1.7–7)
NEUTROPHILS NFR BLD AUTO: 57.6 % (ref 42.7–76)
NRBC BLD AUTO-RTO: 0 /100 WBC (ref 0–0.2)
NT-PROBNP SERPL-MCNC: 25.1 PG/ML (ref 0–450)
PLATELET # BLD AUTO: 228 10*3/MM3 (ref 140–450)
PMV BLD AUTO: 8.7 FL (ref 6–12)
POTASSIUM SERPL-SCNC: 4 MMOL/L (ref 3.5–5.2)
RBC # BLD AUTO: 3.69 10*6/MM3 (ref 4.14–5.8)
SODIUM SERPL-SCNC: 140 MMOL/L (ref 136–145)
WBC # BLD AUTO: 6.22 10*3/MM3 (ref 3.4–10.8)

## 2021-09-13 PROCEDURE — 99232 SBSQ HOSP IP/OBS MODERATE 35: CPT | Performed by: OTOLARYNGOLOGY

## 2021-09-13 PROCEDURE — 97116 GAIT TRAINING THERAPY: CPT

## 2021-09-13 PROCEDURE — 97535 SELF CARE MNGMENT TRAINING: CPT

## 2021-09-13 PROCEDURE — 85025 COMPLETE CBC W/AUTO DIFF WBC: CPT | Performed by: INTERNAL MEDICINE

## 2021-09-13 PROCEDURE — 83880 ASSAY OF NATRIURETIC PEPTIDE: CPT | Performed by: INTERNAL MEDICINE

## 2021-09-13 PROCEDURE — 80048 BASIC METABOLIC PNL TOTAL CA: CPT | Performed by: INTERNAL MEDICINE

## 2021-09-13 NOTE — PROGRESS NOTES
[facility] Otolaryngology Head and Neck Surgery  INPATIENT PROGRESS NOTE    Patient Name: Reggie Treviño  : 1985   MRN: [id number]  Date of Admission: 2021  Today's Date: 21  Length of Stay: 0  574/1    Shahab Garcia MD   Primary Care Physician: Terrell Navas,   Surgical Procedures Since Admission:    Subjective   Subjective   Chief Complaint: Trach management  HPI   Accompanied by: No one  Since last examined, Reggie Treviño is remained stable with a trach in position.  He is sleeping quietly this morning.  RT reports no issues with tracheostomy tube.  Patient is seen, chart is reviewed    Review of Systems   No change from prior inquiry  All pertinent negatives and positives are as above. All other systems have been reviewed and are negative unless otherwise stated.   Objective   Objective   Vitals:        Physical Exam  Constitutional:       General: He is sleeping. He is not in acute distress.     Appearance: He is well-developed. He is obese.      Interventions: He is intubated.      Comments: Trach in position, on trach collar   HENT:      Head: Normocephalic and atraumatic.      Comments: Face-adiposis     Right Ear: External ear normal.      Left Ear: External ear normal.      Nose: Nose normal.      Mouth/Throat:      Mouth: Mucous membranes are dry. No injury.      Dentition: Normal dentition. No gum lesions.      Tongue: No lesions. Tongue does not deviate from midline.      Comments: Macroglossia-severe  Prolapse-severe  OP exam deferred  Eyes:      General: Lids are normal.      Conjunctiva/sclera: Conjunctivae normal.   Neck:      Comments: Larynx-very low    TRACHEOSTOMY SITE:    Tracheostomy tube type: Shiley #6 cuffed DIC XLT P    Stoma: Healing well    [place] valve: Tolerating well    Voice: Good with In position, somewhat breathy  Date placed: 2021  Date last changed: 9/10/2021  Pulmonary:      Effort: He is intubated.       Comments: Trach in position, trach collar  Musculoskeletal:      Cervical back: Decreased range of motion.   Neurological:      Comments: Sleeping with trach collar in position   Psychiatric:      Comments: Sleeping with trach collar in position           Result Review    Result Review:  I have personally reviewed the results from the time of this admission to 9/13/2021 08:55 CDT and agree with these findings:  [x]  Laboratory  []  Microbiology  []  Radiology  []  EKG/Telemetry   []  Cardiology/Vascular   []  Pathology  [x]  Old records  []  Other:  Most notable findings include: Anemia  Progress Notes by Shahab Garcia MD (09/12/2021 09:51)   Progress Notes by Jordyn Gómez APRN (09/11/2021 09:44)   CBC & Differential (09/13/2021 06:09)   Basic Metabolic Panel (09/13/2021 04:58)   BNP (09/13/2021 04:58)       Assessment/Plan   Assessment / Plan   Brief Patient Summary:  Reggie Treviño is a 36 y.o. male who continues with tracheostomy tube in position.  He is sleeping today.  I plan to wean the patient to a Gibson style tracheostomy tube.  This is for hypoventilation obesity syndrome.  He appears to desaturate when he is sleeping.    Active Hospital Problems:   Active Hospital Problems    Diagnosis    • Acute tracheostomy management (CMS/[facility])    • Acute hypoxemic respiratory failure due to COVID-19 (CMS/[facility])    • Obesity, morbid (CMS/[facility])    • TODD (obstructive sleep apnea)      Plan:   • Trach management-patient is tolerating #6 uncuffed trach well.  I will plan to place a Gibson trach for hypoventilation obesity syndrome.  He will continue trach care and trach education.  • Hypoventilation obesity syndrome-patient appears to have proximal he was sleeping.  He will need to have a sleep study.  I will try to maintain him on a Gibson trach tube until he can obtain a sleep study.  • Respiratory failure-Per primary team  Discussed Plan with RT  Following patient as in-patient. Further recommendations  will be made based on serial examinations.    Medications/Orders for this encounter: No new medications ordered.  No New orders written.    Discharge Planning: Per primary team        DVT prophylaxis:  No DVT prophylaxis order currently exists.     Electronically signed by Davin David Jr, MD, 09/13/21, 8:55 AM CDT.

## 2021-09-13 NOTE — PROGRESS NOTES
BERNARDINO Erazo APRN        Internal Medicine Progress Note    9/13/2021   16:41 CDT    Name:  Reggie Treviño  MRN:    [id number]     Acct:     [id number]   Room:  [address] [place] [address] Day: 0     Admit Date: 9/1/2021  8:21 PM  PCP: Terrell Navas DO    Subjective:     C/C: weakness, cough    Interval History: Status:  stayed the same.  Up to chair.  No family at bedside. Tolerating trach to HME. Reports that he will likely discharge to home with trach.  Afebrile.  Progressing with therapy. Counts stable.     Review of Systems   Constitutional: Positive for malaise/fatigue. Negative for chills, decreased appetite, weight gain and weight loss.   HENT: Negative for congestion, ear discharge, hoarse voice and tinnitus.    Eyes: Negative for blurred vision, discharge, visual disturbance and visual halos.   Cardiovascular: Negative for chest pain, claudication, dyspnea on exertion, irregular heartbeat, leg swelling, orthopnea and paroxysmal nocturnal dyspnea.   Respiratory: Positive for cough. Negative for shortness of breath, sputum production and wheezing.    Endocrine: Negative for cold intolerance, heat intolerance and polyuria.   Hematologic/Lymphatic: Negative for adenopathy. Does not bruise/bleed easily.   Skin: Negative for dry skin, itching and suspicious lesions.   Musculoskeletal: Negative for arthritis, back pain, falls, joint pain, muscle weakness and myalgias.   Gastrointestinal: Negative for abdominal pain, constipation, diarrhea, dysphagia and hematemesis.   Genitourinary: Negative for bladder incontinence, dysuria and frequency.   Neurological: Positive for weakness. Negative for aphonia, disturbances in coordination and dizziness.   Psychiatric/Behavioral: Negative for altered mental status, depression, memory loss and substance abuse. The patient does not have insomnia and is not nervous/anxious.          Medications:     Allergies:   Allergies   Allergen Reactions    • Sulfa Antibiotics Rash       Current Meds:   Current Facility-Administered Medications:   •  acetaminophen (TYLENOL) tablet 500 mg, 500 mg, Oral, Q8H PRN, Jordyn Gómez, SRIKANTH  •  albuterol sulfate HFA (PROVENTIL HFA;VENTOLIN HFA;PROAIR HFA) inhaler 6 puff, 6 puff, Inhalation, Q4H PRN, Shahab Garcia MD  •  artificial tears (REFRESH LACRI-LUBE) ophthalmic ointment, , Both Eyes, 4x Daily, Shahab Garcia MD  •  bacitracin ointment, , Topical, Q12H, Shahab Garcia MD  •  dextromethorphan polistirex ER (DELSYM) 30 MG/5ML oral suspension 60 mg, 60 mg, Oral, Q12H PRN, Debi Choi APRN  •  dextrose (D50W) 25 g/ 50mL Intravenous Solution 25 g, 25 g, Intravenous, Q15 Min PRN, Shahab Garcia MD  •  dextrose (GLUTOSE) oral gel 15 g, 15 g, Oral, Q15 Min PRN, Shahab Garcia MD  •  docusate (COLACE) 50 MG/5ML liquid 150 mg, 150 mg, Oral, Daily PRN, Shahab Garcia MD  •  famotidine (PEPCID) tablet 20 mg, 20 mg, Oral, Q12H, Shahab Garcia MD  •  glucagon (human recombinant) (GLUCAGEN DIAGNOSTIC) injection 1 mg, 1 mg, Subcutaneous, Q15 Min PRN, Shahab Garcia MD  •  HYDROcod Polst-CPM Polst ER (TUSSIONEX PENNKINETIC) 10-8 MG/5ML ER suspension 5 mL, 5 mL, Oral, Q12H PRN, Debi Choi APRN  •  hypromellose (ISOPTO TEARS) 0.5 % ophthalmic solution 2 drop, 2 drop, Both Eyes, Q2H PRN, Shahab Garcia MD  •  ipratropium-albuterol (DUO-NEB) nebulizer solution 3 mL, 3 mL, Nebulization, Q6H PRN, Shahab Garcia MD  •  loperamide (IMODIUM) capsule 2 mg, 2 mg, Oral, 4x Daily PRN, Shahab Garcia MD  •  ondansetron (ZOFRAN) injection 4 mg, 4 mg, Intravenous, Q6H PRN **OR** ondansetron (ZOFRAN) tablet 4 mg, 4 mg, Oral, Q6H PRN, Shahab Garcia MD  •  polyethylene glycol (MIRALAX) packet 17 g, 17 g, Oral, Q12H PRN, Shahab Garcia MD  •  saccharomyces boulardii (FLORASTOR) capsule 250 mg, 250 mg, Oral, BID, Shahab Garcia  "MD Sage  •  sodium chloride 0.9 % flush 10 mL, 10 mL, Intravenous, Q12H, Shahab Garcia MD  •  sodium chloride 0.9 % flush 10 mL, 10 mL, Intravenous, PRN, Shahab Garcia MD  •  traZODone (DESYREL) tablet 25 mg, 25 mg, Oral, Nightly PRN, Shahab Garcia MD    Data:     Code Status:    There are no questions and answers to display.       No family history on file.    Social History     Socioeconomic History   • Marital status:      Spouse name: Not on file   • Number of children: Not on file   • Years of education: Not on file   • Highest education level: Not on file   Tobacco Use   • Smoking status: Unknown If Ever Smoked       Vitals:  Ht 172.7 cm (68\")   Wt 120 kg (263 lb 14.4 oz) Comment: 9/6/21  BMI 40.13 kg/m²     T 97.0 P 92 R 26 /58 Sp02 98% (HME)        I/O (24Hr):  No intake or output data in the 24 hours ending 09/13/21 1641    Labs and imaging:      Recent Results (from the past 12 hour(s))   Basic Metabolic Panel    Collection Time: 09/13/21  4:58 AM    Specimen: Blood   Result Value Ref Range    Glucose 94 65 - 99 mg/dL    BUN 6 6 - 20 mg/dL    Creatinine 0.44 (L) 0.76 - 1.27 mg/dL    Sodium 140 136 - 145 mmol/L    Potassium 4.0 3.5 - 5.2 mmol/L    Chloride 100 98 - 107 mmol/L    CO2 33.0 (H) 22.0 - 29.0 mmol/L    Calcium 9.2 8.6 - 10.5 mg/dL    eGFR Non African Amer >150 >60 mL/min/1.73    BUN/Creatinine Ratio 13.6 7.0 - 25.0    Anion Gap 7.0 5.0 - 15.0 mmol/L   BNP    Collection Time: 09/13/21  4:58 AM    Specimen: Blood   Result Value Ref Range    proBNP 25.1 0.0 - 450.0 pg/mL   CBC Auto Differential    Collection Time: 09/13/21  6:09 AM    Specimen: Blood   Result Value Ref Range    WBC 6.22 3.40 - 10.80 10*3/mm3    RBC 3.69 (L) 4.14 - 5.80 10*6/mm3    Hemoglobin 10.7 (L) 13.0 - 17.7 g/dL    Hematocrit 32.9 (L) 37.5 - 51.0 %    MCV 89.2 79.0 - 97.0 fL    MCH 29.0 26.6 - 33.0 pg    MCHC 32.5 31.5 - 35.7 g/dL    RDW 15.6 (H) 12.3 - 15.4 %    RDW-SD 51.0 37.0 - " 54.0 fl    MPV 8.7 6.0 - 12.0 fL    Platelets 228 140 - 450 10*3/mm3    Neutrophil % 57.6 42.7 - 76.0 %    Lymphocyte % 22.5 19.6 - 45.3 %    Monocyte % 10.1 5.0 - 12.0 %    Eosinophil % 8.0 (H) 0.3 - 6.2 %    Basophil % 0.5 0.0 - 1.5 %    Immature Grans % 1.3 (H) 0.0 - 0.5 %    Neutrophils, Absolute 3.58 1.70 - 7.00 10*3/mm3    Lymphocytes, Absolute 1.40 0.70 - 3.10 10*3/mm3    Monocytes, Absolute 0.63 0.10 - 0.90 10*3/mm3    Eosinophils, Absolute 0.50 (H) 0.00 - 0.40 10*3/mm3    Basophils, Absolute 0.03 0.00 - 0.20 10*3/mm3    Immature Grans, Absolute 0.08 (H) 0.00 - 0.05 10*3/mm3    nRBC 0.0 0.0 - 0.2 /100 WBC           Physical Examination:        Physical Exam  Vitals and nursing note reviewed.   Constitutional:       Appearance: He is well-developed.   HENT:      Head: Normocephalic and atraumatic.      Right Ear: External ear normal.      Left Ear: External ear normal.      Nose: Nose normal.      Mouth/Throat:      Mouth: Mucous membranes are moist.   Eyes:      Pupils: Pupils are equal, round, and reactive to light.   Neck:      Comments: Trach to E  Cardiovascular:      Rate and Rhythm: Normal rate and regular rhythm.      Heart sounds: Normal heart sounds.   Pulmonary:      Effort: Pulmonary effort is normal.      Breath sounds: Normal breath sounds.   Abdominal:      General: Bowel sounds are normal.      Palpations: Abdomen is soft.      Comments: obese   Musculoskeletal:         General: Normal range of motion.      Cervical back: Normal range of motion and neck supple.      Comments: Generalized weakness   Skin:     General: Skin is warm and dry.   Neurological:      Mental Status: He is alert and oriented to person, place, and time.      Deep Tendon Reflexes: Reflexes are normal and symmetric.   Psychiatric:         Behavior: Behavior normal.           Assessment:             Acute tracheostomy management (CMS/[facility])    Acute hypoxemic respiratory failure due to COVID-19 (CMS/[facility])    Obesity, morbid  (CMS/[facility])    TODD (obstructive sleep apnea)    Past Medical History:   Diagnosis Date   • Kidney stone         Plan:        1. Acute hypoxic respiratory failure secondary to COVID 19  2. Dysphagia - improved  3. Bilateral chemosis  4. Stenotrophomonas pneumonia  5. Morbid obesity  6. PTSD  7. Hyperglycemia    Continue current treatment. Monitor counts. Increase activity. Labs Thursday.  Aggressive therapies as tolerated. Oxygen weaning as tolerated. Glycemic control. Maintain patient safety. Trach management per ENT. Cough suppressants as needed. Educate on trach care in anticipation of discharge to home with trach.       Electronically signed by SRIKANTH Fulton on 9/13/2021 at 16:41 CDT   I have discussed the care of Reggie Treviño, including pertinent history and exam findings, with the nurse practitioner.    I have seen and examined the patient and the key elements of all parts of the encounter have been performed by me.  I agree with the assessment, plan and orders as documented by SRIKANTH Delgado, after I modified the exam findings and the plan of treatments and the final version is my approved version of the assessment.        Electronically signed by Shahab Garcia MD on 9/13/2021 at 17:26 CDT

## 2021-09-14 PROCEDURE — 97110 THERAPEUTIC EXERCISES: CPT | Performed by: OCCUPATIONAL THERAPIST

## 2021-09-14 PROCEDURE — 97116 GAIT TRAINING THERAPY: CPT

## 2021-09-14 PROCEDURE — 97535 SELF CARE MNGMENT TRAINING: CPT | Performed by: OCCUPATIONAL THERAPIST

## 2021-09-14 NOTE — PROGRESS NOTES
Jose Garcia M.D.  SRIKANTH Delgado        Internal Medicine Progress Note    9/14/2021   14:05 CDT    Name:  Reggie Treviño  MRN:    [id number]     Acct:     [id number]   Room:  [address] [place] [address] Day: 0     Admit Date: 9/1/2021  8:21 PM  PCP: Terrell Navas DO    Subjective:     C/C: weakness, cough    Interval History: Status:  stayed the same.  Up to chair.  Family at bedside. Tolerating trach with cap. 02 sats stable with 02 at 2 lpm per nasal cannula.  Progressing with therapy. Afebrile. Anticipating trach change today. Concerned about discharge to home - states that he was told he would need an outpatient sleep study, but his family has contacted local facilities who report that they will not perform sleep study with a trach.     Review of Systems   Constitutional: Positive for malaise/fatigue. Negative for chills, decreased appetite, weight gain and weight loss.   HENT: Negative for congestion, ear discharge, hoarse voice and tinnitus.    Eyes: Negative for blurred vision, discharge, visual disturbance and visual halos.   Cardiovascular: Negative for chest pain, claudication, dyspnea on exertion, irregular heartbeat, leg swelling, orthopnea and paroxysmal nocturnal dyspnea.   Respiratory: Positive for cough. Negative for shortness of breath, sputum production and wheezing.    Endocrine: Negative for cold intolerance, heat intolerance and polyuria.   Hematologic/Lymphatic: Negative for adenopathy. Does not bruise/bleed easily.   Skin: Negative for dry skin, itching and suspicious lesions.   Musculoskeletal: Negative for arthritis, back pain, falls, joint pain, muscle weakness and myalgias.   Gastrointestinal: Negative for abdominal pain, constipation, diarrhea, dysphagia and hematemesis.   Genitourinary: Negative for bladder incontinence, dysuria and frequency.   Neurological: Positive for weakness. Negative for aphonia, disturbances in coordination and dizziness.    Psychiatric/Behavioral: Negative for altered mental status, depression, memory loss and substance abuse. The patient does not have insomnia and is not nervous/anxious.          Medications:     Allergies:   Allergies   Allergen Reactions   • Sulfa Antibiotics Rash       Current Meds:   Current Facility-Administered Medications:   •  acetaminophen (TYLENOL) tablet 500 mg, 500 mg, Oral, Q8H PRN, Jordyn Gómez APRN  •  albuterol sulfate HFA (PROVENTIL HFA;VENTOLIN HFA;PROAIR HFA) inhaler 6 puff, 6 puff, Inhalation, Q4H PRN, Shahab Garcia MD  •  artificial tears (REFRESH LACRI-LUBE) ophthalmic ointment, , Both Eyes, 4x Daily, Shahab Gracia MD  •  bacitracin ointment, , Topical, Q12H, Shahab Garcia MD  •  dextromethorphan polistirex ER (DELSYM) 30 MG/5ML oral suspension 60 mg, 60 mg, Oral, Q12H PRN, Deib Choi APRN  •  dextrose (D50W) 25 g/ 50mL Intravenous Solution 25 g, 25 g, Intravenous, Q15 Min PRN, Shahab Garcia MD  •  dextrose (GLUTOSE) oral gel 15 g, 15 g, Oral, Q15 Min PRN, Shahab Garcia MD  •  docusate (COLACE) 50 MG/5ML liquid 150 mg, 150 mg, Oral, Daily PRN, Shahab Garcia MD  •  famotidine (PEPCID) tablet 20 mg, 20 mg, Oral, Q12H, Shahab Garcia MD  •  glucagon (human recombinant) (GLUCAGEN DIAGNOSTIC) injection 1 mg, 1 mg, Subcutaneous, Q15 Min PRN, Shahab Garcia MD  •  HYDROcod Polst-CPM Polst ER (TUSSIONEX PENNKINETIC) 10-8 MG/5ML ER suspension 5 mL, 5 mL, Oral, Q12H PRN, Debi Choi APRN  •  hypromellose (ISOPTO TEARS) 0.5 % ophthalmic solution 2 drop, 2 drop, Both Eyes, Q2H PRN, Shahab Garcia MD  •  ipratropium-albuterol (DUO-NEB) nebulizer solution 3 mL, 3 mL, Nebulization, Q6H PRN, Shahab Garcia MD  •  loperamide (IMODIUM) capsule 2 mg, 2 mg, Oral, 4x Daily PRN, Shahab Garcia MD  •  ondansetron (ZOFRAN) injection 4 mg, 4 mg, Intravenous, Q6H PRN **OR** ondansetron (ZOFRAN) tablet 4  "mg, 4 mg, Oral, Q6H PRN, Shahab Garcia MD  •  polyethylene glycol (MIRALAX) packet 17 g, 17 g, Oral, Q12H PRN, Shahab Garcia MD  •  saccharomyces boulardii (FLORASTOR) capsule 250 mg, 250 mg, Oral, BID, Shahab Garcia MD  •  sodium chloride 0.9 % flush 10 mL, 10 mL, Intravenous, Q12H, Shahab Garcia MD  •  sodium chloride 0.9 % flush 10 mL, 10 mL, Intravenous, PRN, Shahab Garcia MD  •  traZODone (DESYREL) tablet 25 mg, 25 mg, Oral, Nightly PRN, Shahab Garcia MD    Data:     Code Status:    There are no questions and answers to display.       No family history on file.    Social History     Socioeconomic History   • Marital status:      Spouse name: Not on file   • Number of children: Not on file   • Years of education: Not on file   • Highest education level: Not on file   Tobacco Use   • Smoking status: Unknown If Ever Smoked       Vitals:  Ht 172.7 cm (68\")   Wt 120 kg (263 lb 14.4 oz) Comment: 9/6/21  BMI 40.13 kg/m²     T 98.2 P 104 R 30 /69 Sp02 97% (2lpm)        I/O (24Hr):  No intake or output data in the 24 hours ending 09/14/21 1405    Labs and imaging:      No results found for this or any previous visit (from the past 12 hour(s)).        Physical Examination:        Physical Exam  Vitals and nursing note reviewed.   Constitutional:       Appearance: He is well-developed.   HENT:      Head: Normocephalic and atraumatic.      Right Ear: External ear normal.      Left Ear: External ear normal.      Nose: Nose normal.      Mouth/Throat:      Mouth: Mucous membranes are moist.   Eyes:      Pupils: Pupils are equal, round, and reactive to light.   Neck:      Comments: Trach capped  Cardiovascular:      Rate and Rhythm: Normal rate and regular rhythm.      Heart sounds: Normal heart sounds.   Pulmonary:      Effort: Pulmonary effort is normal.      Breath sounds: Normal breath sounds.   Abdominal:      General: Bowel sounds are normal.      " Palpations: Abdomen is soft.      Comments: obese   Musculoskeletal:         General: Normal range of motion.      Cervical back: Normal range of motion and neck supple.      Comments: Generalized weakness   Skin:     General: Skin is warm and dry.   Neurological:      Mental Status: He is alert and oriented to person, place, and time.      Deep Tendon Reflexes: Reflexes are normal and symmetric.   Psychiatric:         Behavior: Behavior normal.           Assessment:             Acute tracheostomy management (CMS/[facility])    Acute hypoxemic respiratory failure due to COVID-19 (CMS/[facility])    Obesity, morbid (CMS/[facility])    TODD (obstructive sleep apnea)    Past Medical History:   Diagnosis Date   • Kidney stone         Plan:        1. Acute hypoxic respiratory failure secondary to COVID 19  2. Dysphagia - improved  3. Bilateral chemosis  4. Stenotrophomonas pneumonia  5. Morbid obesity  6. PTSD  7. Hyperglycemia    Continue current treatment. Monitor counts. Increase activity. Labs Thursday.  Aggressive therapies as tolerated. Oxygen weaning as tolerated. Glycemic control. Maintain patient safety. Trach management per ENT. Cough suppressants as needed. Educate on trach care in anticipation of discharge to home with trach. Discussed with respiratory therapy -options for discharge with outpatient sleep study with trach.       Electronically signed by SRIKANTH Fulton on 9/14/2021 at 14:05 CDT   I have discussed the care of Reggie Treviño, including pertinent history and exam findings, with the nurse practitioner.    I have seen and examined the patient and the key elements of all parts of the encounter have been performed by me.  I agree with the assessment, plan and orders as documented by SRIKANTH Delgado, after I modified the exam findings and the plan of treatments and the final version is my approved version of the assessment.        Electronically signed by Shahab Garcia MD on 9/14/2021 at 22:33  CDT

## 2021-09-14 NOTE — PROGRESS NOTES
"LTACH Fall Assessment Note    Reggie Treviño is a 36 y.o.male  [Ht: 172.7 cm (68\"); Wt: 120 kg (263 lb 14.4 oz)] admitted 9/1/2021  8:21 PM.    Current medications associated with an increased risk for fall include:  Tussionex  Loperamide  Ondansetron  Trazodone    ALEJA Lynne [facility]  09/14/2116:01 CDT         "

## 2021-09-15 PROCEDURE — 97116 GAIT TRAINING THERAPY: CPT

## 2021-09-15 PROCEDURE — 97110 THERAPEUTIC EXERCISES: CPT

## 2021-09-15 PROCEDURE — 31615 TRCHEOBRNCHSC EST TRACHS INC: CPT | Performed by: OTOLARYNGOLOGY

## 2021-09-15 PROCEDURE — 97168 OT RE-EVAL EST PLAN CARE: CPT | Performed by: OCCUPATIONAL THERAPIST

## 2021-09-15 PROCEDURE — 97535 SELF CARE MNGMENT TRAINING: CPT | Performed by: OCCUPATIONAL THERAPIST

## 2021-09-15 PROCEDURE — 99232 SBSQ HOSP IP/OBS MODERATE 35: CPT | Performed by: OTOLARYNGOLOGY

## 2021-09-15 PROCEDURE — 97535 SELF CARE MNGMENT TRAINING: CPT

## 2021-09-15 PROCEDURE — 31502 CHANGE OF WINDPIPE AIRWAY: CPT | Performed by: OTOLARYNGOLOGY

## 2021-09-15 NOTE — PROGRESS NOTES
Jose Garcia M.D.  SRIKANTH Delgaod        Internal Medicine Progress Note    9/15/2021   10:36 CDT    Name:  Reggie Treviño  MRN:    [id number]     Acct:     [id number]   Room:  [address] [place] [address] Day: 0     Admit Date: 9/1/2021  8:21 PM  PCP: Terrell Navas DO    Subjective:     C/C: weakness, cough    Interval History: Status:  stayed the same.  Up to chair.  No family at bedside. Tolerated trach change this morning - changed to Gibson. C/o some throat discomfort. 02 sats stable with 02 at 3 lpm per nasal cannula.  Progressing with therapy. Afebrile.     Review of Systems   Constitutional: Positive for malaise/fatigue. Negative for chills, decreased appetite, weight gain and weight loss.   HENT: Negative for congestion, ear discharge, hoarse voice and tinnitus.    Eyes: Negative for blurred vision, discharge, visual disturbance and visual halos.   Cardiovascular: Negative for chest pain, claudication, dyspnea on exertion, irregular heartbeat, leg swelling, orthopnea and paroxysmal nocturnal dyspnea.   Respiratory: Positive for cough. Negative for shortness of breath, sputum production and wheezing.    Endocrine: Negative for cold intolerance, heat intolerance and polyuria.   Hematologic/Lymphatic: Negative for adenopathy. Does not bruise/bleed easily.   Skin: Negative for dry skin, itching and suspicious lesions.   Musculoskeletal: Negative for arthritis, back pain, falls, joint pain, muscle weakness and myalgias.   Gastrointestinal: Negative for abdominal pain, constipation, diarrhea, dysphagia and hematemesis.   Genitourinary: Negative for bladder incontinence, dysuria and frequency.   Neurological: Positive for weakness. Negative for aphonia, disturbances in coordination and dizziness.   Psychiatric/Behavioral: Negative for altered mental status, depression, memory loss and substance abuse. The patient does not have insomnia and is not nervous/anxious.          Medications:      Allergies:   Allergies   Allergen Reactions   • Sulfa Antibiotics Rash       Current Meds:   Current Facility-Administered Medications:   •  acetaminophen (TYLENOL) tablet 500 mg, 500 mg, Oral, Q8H PRN, Jordyn Gómez APRN  •  albuterol sulfate HFA (PROVENTIL HFA;VENTOLIN HFA;PROAIR HFA) inhaler 6 puff, 6 puff, Inhalation, Q4H PRN, Shahab Garcia MD  •  artificial tears (REFRESH LACRI-LUBE) ophthalmic ointment, , Both Eyes, 4x Daily, Shahab Garcia MD  •  bacitracin ointment, , Topical, Q12H, Shahab Garcia MD  •  dextromethorphan polistirex ER (DELSYM) 30 MG/5ML oral suspension 60 mg, 60 mg, Oral, Q12H PRN, Debi Choi APRN  •  dextrose (D50W) 25 g/ 50mL Intravenous Solution 25 g, 25 g, Intravenous, Q15 Min PRN, Shahab Garcia MD  •  dextrose (GLUTOSE) oral gel 15 g, 15 g, Oral, Q15 Min PRN, Shahab Garcia MD  •  docusate (COLACE) 50 MG/5ML liquid 150 mg, 150 mg, Oral, Daily PRN, Shahab Garcia MD  •  famotidine (PEPCID) tablet 20 mg, 20 mg, Oral, Q12H, Shahab Garcia MD  •  glucagon (human recombinant) (GLUCAGEN DIAGNOSTIC) injection 1 mg, 1 mg, Subcutaneous, Q15 Min PRN, Shahab Garcia MD  •  HYDROcod Polst-CPM Polst ER (TUSSIONEX PENNKINETIC) 10-8 MG/5ML ER suspension 5 mL, 5 mL, Oral, Q12H PRN, Debi Choi APRN  •  hypromellose (ISOPTO TEARS) 0.5 % ophthalmic solution 2 drop, 2 drop, Both Eyes, Q2H PRN, Shahab Garcia MD  •  ipratropium-albuterol (DUO-NEB) nebulizer solution 3 mL, 3 mL, Nebulization, Q6H PRN, Shahab Garcia MD  •  loperamide (IMODIUM) capsule 2 mg, 2 mg, Oral, 4x Daily PRN, Shahab Garcia MD  •  ondansetron (ZOFRAN) injection 4 mg, 4 mg, Intravenous, Q6H PRN **OR** ondansetron (ZOFRAN) tablet 4 mg, 4 mg, Oral, Q6H PRN, Shahab Garcia MD  •  polyethylene glycol (MIRALAX) packet 17 g, 17 g, Oral, Q12H PRN, Shahab Garcia MD  •  saccharomyces boulardii (FLORASTOR) capsule  "250 mg, 250 mg, Oral, BID, Shahab Garcia MD  •  sodium chloride 0.9 % flush 10 mL, 10 mL, Intravenous, Q12H, Shahab Garcia MD  •  sodium chloride 0.9 % flush 10 mL, 10 mL, Intravenous, PRN, Shahab Garcia MD  •  traZODone (DESYREL) tablet 25 mg, 25 mg, Oral, Nightly PRN, Shahab Garcia MD    Data:     Code Status:    There are no questions and answers to display.       No family history on file.    Social History     Socioeconomic History   • Marital status:      Spouse name: Not on file   • Number of children: Not on file   • Years of education: Not on file   • Highest education level: Not on file   Tobacco Use   • Smoking status: Unknown If Ever Smoked       Vitals:  Ht 172.7 cm (68\")   Wt 120 kg (263 lb 14.4 oz) Comment: 9/6/21  BMI 40.13 kg/m²     T 98.9 P 102 R 16 /78 Sp02 92% (3 lpm)        I/O (24Hr):  No intake or output data in the 24 hours ending 09/15/21 1036    Labs and imaging:      No results found for this or any previous visit (from the past 12 hour(s)).        Physical Examination:        Physical Exam  Vitals and nursing note reviewed.   Constitutional:       Appearance: He is well-developed.   HENT:      Head: Normocephalic and atraumatic.      Right Ear: External ear normal.      Left Ear: External ear normal.      Nose: Nose normal.      Mouth/Throat:      Mouth: Mucous membranes are moist.   Eyes:      Pupils: Pupils are equal, round, and reactive to light.   Neck:      Comments: Gibson trach  Cardiovascular:      Rate and Rhythm: Normal rate and regular rhythm.      Heart sounds: Normal heart sounds.   Pulmonary:      Effort: Pulmonary effort is normal.      Breath sounds: Normal breath sounds.   Abdominal:      General: Bowel sounds are normal.      Palpations: Abdomen is soft.      Comments: obese   Musculoskeletal:         General: Normal range of motion.      Cervical back: Normal range of motion and neck supple.      Comments: " Generalized weakness   Skin:     General: Skin is warm and dry.   Neurological:      Mental Status: He is alert and oriented to person, place, and time.      Deep Tendon Reflexes: Reflexes are normal and symmetric.   Psychiatric:         Behavior: Behavior normal.           Assessment:             Acute tracheostomy management (CMS/[facility])    Acute hypoxemic respiratory failure due to COVID-19 (CMS/[facility])    Obesity, morbid (CMS/[facility])    TODD (obstructive sleep apnea)    Past Medical History:   Diagnosis Date   • Kidney stone         Plan:        1. Acute hypoxic respiratory failure secondary to COVID 19  2. Dysphagia - improved  3. Bilateral chemosis  4. Stenotrophomonas pneumonia  5. Morbid obesity  6. PTSD  7. Hyperglycemia    Continue current treatment. Monitor counts. Increase activity. Labs in am.  Aggressive therapies as tolerated. Oxygen weaning as tolerated. Glycemic control. Maintain patient safety. Trach management per ENT. Cough suppressants as needed. Educate on trach care in anticipation of discharge to home with trach. Discussed with respiratory therapy -options for discharge with outpatient sleep study with trach.       Electronically signed by SRIKANTH Fulton on 9/15/2021 at 10:36 CDT   I have discussed the care of Reggie Treviño, including pertinent history and exam findings, with the nurse practitioner.    I have seen and examined the patient and the key elements of all parts of the encounter have been performed by me.  I agree with the assessment, plan and orders as documented by SRIKANTH Delgado, after I modified the exam findings and the plan of treatments and the final version is my approved version of the assessment.        Electronically signed by Shahab Garcia MD on 9/15/2021 at 15:04 CDT

## 2021-09-15 NOTE — PROCEDURES
[facility] Otolaryngology Head and Neck Surgery  PROCEDURE NOTE    Anesthesia: none    Indications for Procedure:     Acute tracheostomy management (CMS/HCC)    Acute hypoxemic respiratory failure due to COVID-19 (CMS/HCC)    Obesity, morbid (CMS/HCC)    TODD (obstructive sleep apnea)      Endoscopy Type: Flexible Tracheobronchoscopy    Indications for Procedure:   Tracheostomy evaluation  Trachea evaluation    Procedure Details:    The patient was placed in the sitting position.  The SCOPE TYPE: Flexible laryngoscope was passed both via the tracheostome and the tracheostomy tube .  The trachea from the subglottis to the terese was examined.  A retrograde examination of the Vocal cords and subglottis was carried out during respiration and phonation.  The following findings were noted:    Findings:   TRACHEO-BRONCHOSCOPY:    Stoma: Healing appropriately with long tract   Distal Trachea: Intact, mildly dry, no mucosal abnormalities, terese sharp, MSB patent   Proximal trachea: Dry, no scarring, no stenosis, no secretions   True Vocal cords: Mobile, no penetration   Subglottis: No stenosis, intact mucosa     Condition:  Stable.  Patient tolerated procedure well.    Complications:  None    Procedure:  Tracheostomy tube change    Procedure Details:    The patient's respiratory status was assessed.  The trach tube in position was assessed.  The patient was positioned.  The trach tube was removed without difficulty. The stoma and trachea were inspected.    A new tracheostomy tube was re- inserted. Trach tube position was confirmed with scope. Gibson trach cannula Size  10    Findings: See above    Condition:  Stable.  Patient tolerated procedure well.    Complications:  None    Post-procedure instructions reviewed with Patient, Spouse, RT     Electronically signed by Davin David Jr, MD, 09/15/21, 9:02 AM CDT.

## 2021-09-15 NOTE — PROGRESS NOTES
[facility] Otolaryngology Head and Neck Surgery  INPATIENT PROGRESS NOTE    Patient Name: Reggie Treviño  : 1985   MRN: [id number]  Date of Admission: 2021  Today's Date: 09/15/21  Length of Stay: 0  574/1    Shahab Garcia MD   Primary Care Physician: Terrell Navas, DO  Surgical Procedures Since Admission:    Subjective   Subjective   Chief Complaint: Trach management  HPI   Accompanied by: RT, spouse by phone  Since last examined, Reggie Treviño has successfully been capped during the day with nasal O2.  He continues with a trach in position.  He requires open trach at night for airway obstruction from sleep apnea.  He is very concerned about his home needs at this point in time.  Wife also is concerned about home needs and asked many questions.  RT reports no issues with current trach.  Patient is seen, chart is reviewed    Review of Systems   No change from prior inquiry  All pertinent negatives and positives are as above. All other systems have been reviewed and are negative unless otherwise stated.   Objective   Objective   Vitals:        Physical Exam  Constitutional:       General: He is not in acute distress.     Appearance: He is well-developed. He is obese.      Interventions: He is intubated.      Comments: Trach in position, on trach collar   HENT:      Head: Normocephalic and atraumatic.      Comments: Face-adiposis     Right Ear: External ear normal.      Left Ear: External ear normal.      Nose: Nose normal.      Mouth/Throat:      Mouth: Mucous membranes are dry. No injury.      Dentition: Normal dentition. No gum lesions.      Tongue: No lesions. Tongue does not deviate from midline.      Comments: Macroglossia-severe  Prolapse-severe  OP exam deferred  Eyes:      General: Lids are normal.      Conjunctiva/sclera: Conjunctivae normal.   Neck:      Trachea: Tracheostomy present.      Comments: Larynx-very low    TRACHEOSTOMY SITE:    Tracheostomy tube  type: Shiley #6 cuffed DIC XLT P    Stoma: Healing well  Capping: Tolerating well    Voice: Good with In position, somewhat breathy  Date placed: 7/30/2021  Date last changed: 9/15/2021, Arthur #10 tracheal cannula  See procedure note for detail  Pulmonary:      Effort: No tachypnea or respiratory distress. He is intubated.      Comments: Trach in position, nasal O2, trach capped  Musculoskeletal:      Cervical back: Decreased range of motion.   Neurological:      Mental Status: He is alert and oriented to person, place, and time.      Cranial Nerves: Cranial nerves are intact.   Psychiatric:         Attention and Perception: Attention and perception normal.         Mood and Affect: Affect normal. Mood is anxious.         Speech: Speech normal.         Behavior: Behavior normal. Behavior is cooperative.         Cognition and Memory: Cognition and memory normal.           Result Review    Result Review:  I have personally reviewed the results from the time of this admission to 9/15/2021 08:00 CDT and agree with these findings:  []  Laboratory  []  Microbiology  []  Radiology  []  EKG/Telemetry   []  Cardiology/Vascular   []  Pathology  [x]  Old records  []  Other:  Most notable findings include: None pertinent ENT evaluation today.  Progress Notes by Shahab Garcia MD (09/14/2021 11:25)       Assessment/Plan   Assessment / Plan   Brief Patient Summary:  Reggie Treviño is a 36 y.o. male who has maintained a trach in position.  He is currently capping during the day with nasal O2 and uncapping at night.  This is to treat his sleep apnea.  He is tolerating the trach well.    Active Hospital Problems:   Active Hospital Problems    Diagnosis    • Acute tracheostomy management (CMS/[facility])    • Acute hypoxemic respiratory failure due to COVID-19 (CMS/[facility])    • Obesity, morbid (CMS/[facility])    • TODD (obstructive sleep apnea)      Plan:   • Trach management-the patient is tolerating trach well.  I replaced his Mendoza  trach with a Gibson #10 cannula.  I will see if this is satisfactory to treat his prior [place] needs.  I will continue trach management.  • Hypoventilation, obesity syndrome-I will treat this with the trach.  I will begin trach education for home care for sleep apnea.  • Obesity-Per primary team  Discussed Plan with patient, spouse, RT  Following patient as in-patient. Further recommendations will be made based on serial examinations.    Medications/Orders for this encounter: No new medications ordered.  No New orders written.    Discharge Planning: Per primary team        DVT prophylaxis:  No DVT prophylaxis order currently exists.     Electronically signed by Davin David Jr, MD, 09/15/21, 8:00 AM CDT.

## 2021-09-16 LAB
ANION GAP SERPL CALCULATED.3IONS-SCNC: 8 MMOL/L (ref 5–15)
BASOPHILS # BLD AUTO: 0.02 10*3/MM3 (ref 0–0.2)
BASOPHILS NFR BLD AUTO: 0.2 % (ref 0–1.5)
BUN SERPL-MCNC: 6 MG/DL (ref 6–20)
BUN/CREAT SERPL: 14 (ref 7–25)
CALCIUM SPEC-SCNC: 9.5 MG/DL (ref 8.6–10.5)
CHLORIDE SERPL-SCNC: 100 MMOL/L (ref 98–107)
CO2 SERPL-SCNC: 30 MMOL/L (ref 22–29)
CREAT SERPL-MCNC: 0.43 MG/DL (ref 0.76–1.27)
DEPRECATED RDW RBC AUTO: 50.9 FL (ref 37–54)
EOSINOPHIL # BLD AUTO: 0.31 10*3/MM3 (ref 0–0.4)
EOSINOPHIL NFR BLD AUTO: 3.5 % (ref 0.3–6.2)
ERYTHROCYTE [DISTWIDTH] IN BLOOD BY AUTOMATED COUNT: 15.6 % (ref 12.3–15.4)
GFR SERPL CREATININE-BSD FRML MDRD: >150 ML/MIN/1.73
GLUCOSE SERPL-MCNC: 95 MG/DL (ref 65–99)
HCT VFR BLD AUTO: 35.1 % (ref 37.5–51)
HGB BLD-MCNC: 11.1 G/DL (ref 13–17.7)
IMM GRANULOCYTES # BLD AUTO: 0.03 10*3/MM3 (ref 0–0.05)
IMM GRANULOCYTES NFR BLD AUTO: 0.3 % (ref 0–0.5)
LYMPHOCYTES # BLD AUTO: 1.27 10*3/MM3 (ref 0.7–3.1)
LYMPHOCYTES NFR BLD AUTO: 14.3 % (ref 19.6–45.3)
MCH RBC QN AUTO: 28.5 PG (ref 26.6–33)
MCHC RBC AUTO-ENTMCNC: 31.6 G/DL (ref 31.5–35.7)
MCV RBC AUTO: 90 FL (ref 79–97)
MONOCYTES # BLD AUTO: 0.82 10*3/MM3 (ref 0.1–0.9)
MONOCYTES NFR BLD AUTO: 9.2 % (ref 5–12)
NEUTROPHILS NFR BLD AUTO: 6.44 10*3/MM3 (ref 1.7–7)
NEUTROPHILS NFR BLD AUTO: 72.5 % (ref 42.7–76)
NRBC BLD AUTO-RTO: 0 /100 WBC (ref 0–0.2)
NT-PROBNP SERPL-MCNC: 12.1 PG/ML (ref 0–450)
PLATELET # BLD AUTO: 202 10*3/MM3 (ref 140–450)
PMV BLD AUTO: 8.8 FL (ref 6–12)
POTASSIUM SERPL-SCNC: 4.1 MMOL/L (ref 3.5–5.2)
RBC # BLD AUTO: 3.9 10*6/MM3 (ref 4.14–5.8)
SODIUM SERPL-SCNC: 138 MMOL/L (ref 136–145)
WBC # BLD AUTO: 8.89 10*3/MM3 (ref 3.4–10.8)

## 2021-09-16 PROCEDURE — 97116 GAIT TRAINING THERAPY: CPT

## 2021-09-16 PROCEDURE — 80048 BASIC METABOLIC PNL TOTAL CA: CPT | Performed by: INTERNAL MEDICINE

## 2021-09-16 PROCEDURE — 97110 THERAPEUTIC EXERCISES: CPT

## 2021-09-16 PROCEDURE — 99232 SBSQ HOSP IP/OBS MODERATE 35: CPT | Performed by: OTOLARYNGOLOGY

## 2021-09-16 PROCEDURE — 83880 ASSAY OF NATRIURETIC PEPTIDE: CPT | Performed by: INTERNAL MEDICINE

## 2021-09-16 PROCEDURE — 85025 COMPLETE CBC W/AUTO DIFF WBC: CPT | Performed by: INTERNAL MEDICINE

## 2021-09-16 NOTE — PROGRESS NOTES
"Adult Nutrition  Assessment/PES    Patient Name:  Reggie Treviño  YOB: 1985  MRN: [id number]  Admit Date:  9/1/2021    Assessment Date:  9/16/2021    Comments:  PO 83%; gradual weight loss desired. Will continue to follow PO, weight trend, and nutrition-related lab values per protocol.    Reason for Assessment     Row Name 09/16/21 1200          Reason for Assessment    Reason For Assessment  follow-up protocol     Diagnosis  pulmonary disease     Identified At Risk by Screening Criteria  no indicators present         Nutrition/Diet History     Row Name 09/16/21 1200          Nutrition/Diet History    Typical Food/Fluid Intake  PO remains excellent. Weight trend monitored; encouraged pt to verbalize preferences. Snacks and drinks from OSH in room. Will continue to follow.         Anthropometrics     Row Name 09/16/21 1202 09/16/21 1201       Anthropometrics    Height  172.7 cm (68\")  172.7 cm (68\")       Ideal Body Weight (IBW)    Ideal Body Weight (IBW) (kg)  70.89  70.89    Row Name 09/16/21 1200          Anthropometrics    Height  172.7 cm (68\")     Weight  123 kg (272 lb) 9/13 - bed scale        Admit Weight    Admit Weight  133 kg (294 lb)        Ideal Body Weight (IBW)    Ideal Body Weight (IBW) (kg)  70.89     % Ideal Body Weight  174.04        Body Mass Index (BMI)    BMI (kg/m2)  41.44     BMI Assessment  BMI 40 or greater: obesity grade III         Labs/Tests/Procedures/Meds     Row Name 09/16/21 1201          Labs/Procedures/Meds    Lab Results Reviewed  reviewed, pertinent        Diagnostic Tests/Procedures    Diagnostic Test/Procedure Reviewed  reviewed, pertinent        Medications    Pertinent Medications Reviewed  reviewed, pertinent     Pertinent Medications Comments  See MAR         Physical Findings     Row Name 09/16/21 1201          Physical Findings    Overall Physical Appearance  obese;on oxygen therapy     Gastrointestinal  -- BM 9/15     Skin  -- Healing     " "    Estimated/Assessed Needs     Row Name 09/16/21 1202 09/16/21 1201       Calculation Measurements    Weight Used For Calculations  70 kg (154 lb 5.2 oz)  70 kg (154 lb 5.2 oz) IBW    Height  172.7 cm (68\")  172.7 cm (68\")       Estimated/Assessed Needs    Additional Documentation  --  KCAL/KG (Group);Protein Requirements (Group);Fluid Requirements (Group)       KCAL/KG    KCAL/KG  --  20 Kcal/Kg (kcal);25 Kcal/Kg (kcal);30 Kcal/Kg (kcal)    20 Kcal/Kg (kcal)  1400  1400    25 Kcal/Kg (kcal)  1750  1750    30 Kcal/Kg (kcal)  2100  2100       Protein Requirements    Weight Used For Protein Calculations  --  70 kg (154 lb 5.2 oz) IBW    Est Protein Requirement Amount (gms/kg)  --  1.5 gm protein    Estimated Protein Requirements (gms/day)  --  105       Fluid Requirements    Fluid Requirements (mL/day)  --  2100    Estimated Fluid Requirement Method  --  RDA Method    RDA Method (mL)  --  2100    Row Name 09/16/21 1200          Calculation Measurements    Height  172.7 cm (68\")         Nutrition Prescription Ordered     Row Name 09/16/21 1202          Nutrition Prescription PO    Current PO Diet  Regular     Fluid Consistency  Thin     Supplement Frequency  Snack time         Evaluation of Received Nutrient/Fluid Intake     Row Name 09/16/21 1202          Calculation Measurements    Weight Used For Calculations  70 kg (154 lb 5.2 oz)     Height  172.7 cm (68\")        Nutrient/Fluid Evaluation    Number of Days Evaluated  3 days        Intake Assessment    Energy/Calorie Requirement Assessment  meeting needs     Protein Requirement Assessment  meeting needs     Fluid Requirement Assessment  not meeting needs Encouraged hydration     Tolerance  tolerating        Fluid Intake Evaluation    Oral Fluid (mL)  1093 Likely inaccurate; one day's shift missing PO fluid intake; usually between 1200-1400mL/day        PO Evaluation    Number of Days PO Intake Evaluated  3 days     Number of Meals  9     % PO Intake  83     "           Problem/Interventions:  Problem 1     Row Name 09/16/21 1203          Nutrition Diagnoses Problem 1    Problem 1  Increased Nutrient Needs     Macronutrient  Protein     Etiology (related to)  Medical Diagnosis     Skin  Other (comment) Stage 2 to sacrum     Signs/Symptoms (evidenced by)  PO Intake;Other (comment) requested increased protein (likes meat); requires 1.5g pro/kg IBW for estimated needs for obesity class III.     Percent (%) intake recorded  83 %     Over number of meals  9     Resolved?  Yes         Problem 2     Row Name 09/16/21 1204          Nutrition Diagnoses Problem 2    Problem 2  Nutrition Appropriate for Condition at this Time             Intervention Goal     Row Name 09/16/21 1204          Intervention Goal    General  Maintain nutrition     PO  PO intake (%)     PO Intake %  75 %     Weight  Appropriate weight loss         Nutrition Intervention     Row Name 09/16/21 1204          Nutrition Intervention    RD/Tech Action  Follow Tx progress;Care plan reviewd         Nutrition Prescription     Row Name 09/16/21 1204          Nutrition Prescription PO    PO Prescription  Other (comment) Continue same protocol.         Education/Evaluation     Row Name 09/16/21 1204          Education    Education  No discharge needs identified at this time        Monitor/Evaluation    Monitor  Per protocol           Electronically signed by:  Juliet Gallardo RD  09/16/21 12:04 CDT

## 2021-09-16 NOTE — PROGRESS NOTES
[facility] Otolaryngology Head and Neck Surgery  INPATIENT PROGRESS NOTE    Patient Name: Reggie Treviño  : 1985   MRN: [id number]  Date of Admission: 2021  Today's Date: 21  Length of Stay: 0  574/1    Shahab Garcia MD   Primary Care Physician: Terrell Navas,   Surgical Procedures Since Admission:    Subjective   Subjective   Chief Complaint: Trach management  HPI   Accompanied by: Wife  Since last examined, Reggie Treviño has remained stable with the neck, trach in position.  He says he slept well last night.  He is able to [place] Himself.  He has a mild sore throat since his trach change.  RT reports no issues with tracheostomy tube.    Review of Systems   No change from prior inquiry  All pertinent negatives and positives are as above. All other systems have been reviewed and are negative unless otherwise stated.   Objective   Objective   Vitals:        Physical Exam  Constitutional:       General: He is not in acute distress.     Appearance: He is well-developed. He is obese.      Interventions: He is intubated.      Comments: Trach in position, trach capped, on nasal O2   HENT:      Head: Normocephalic and atraumatic.      Comments: Face-adiposis     Right Ear: External ear normal.      Left Ear: External ear normal.      Nose: Nose normal.      Comments: Wearing nasal O2     Mouth/Throat:      Mouth: Mucous membranes are dry. No injury.      Dentition: Normal dentition. No gum lesions.      Tongue: No lesions. Tongue does not deviate from midline.      Comments: Macroglossia-severe  Prolapse-severe  OP exam deferred  Eyes:      General: Lids are normal.      Conjunctiva/sclera: Conjunctivae normal.   Neck:      Trachea: Tracheostomy present.      Comments: Larynx-very low    TRACHEOSTOMY SITE:    Tracheostomy tube type: Gibson #10 tracheal cannula   stoma: Healing well  Capping: Tolerating well    Voice: Good with In position, mildly breathy  Date  placed: 7/30/2021  Date last changed: 9/15/2021  Pulmonary:      Effort: No tachypnea or respiratory distress. He is intubated.      Comments: Trach in position, trach capped, nasal O2  Musculoskeletal:      Cervical back: Decreased range of motion.   Neurological:      Mental Status: He is alert and oriented to person, place, and time.      Cranial Nerves: Cranial nerves are intact.   Psychiatric:         Attention and Perception: Attention and perception normal.         Mood and Affect: Affect normal. Mood is anxious.         Speech: Speech normal.         Behavior: Behavior normal. Behavior is cooperative.         Cognition and Memory: Cognition and memory normal.           Result Review    Result Review:  I have personally reviewed the results from the time of this admission to 9/16/2021 15:23 CDT and agree with these findings:  [x]  Laboratory  []  Microbiology  []  Radiology  []  EKG/Telemetry   []  Cardiology/Vascular   []  Pathology  [x]  Old records  []  Other:  Most notable findings include: Mild anemia, neither labs pertinent to ENT today.  CBC & Differential (09/16/2021 06:41)   Basic Metabolic Panel (09/16/2021 06:41)   BNP (09/16/2021 06:41)       Assessment/Plan   Assessment / Plan   Brief Patient Summary:  Reggie Treviño is a 36 y.o. male who remains with tracheostomy in position.  He has a Gibson #10 cannula.  He is capping during the day, uncapping at night for sleep apnea.  He has a stable trach in position.    Active Hospital Problems:   Active Hospital Problems    Diagnosis    • Acute tracheostomy management (CMS/[facility])    • Acute hypoxemic respiratory failure due to COVID-19 (CMS/[facility])    • Obesity, morbid (CMS/[facility])    • TODD (obstructive sleep apnea)      Plan:   • Trach management-the patient is stable with Gibson #10 cannula in position.  He will continue capping during the day and uncapping at night.  He will continue trach care around the site.  • Respiratory failure-the patient  continues to improve.  Per primary team  • Covid pneumonia-the patient continues to recover  Discussed Plan with RT, patient, wife  Following patient as in-patient. Further recommendations will be made based on serial examinations.    Medications/Orders for this encounter: No new medications ordered.  No New orders written.    Discharge Planning: Per primary team        DVT prophylaxis:  No DVT prophylaxis order currently exists.     Electronically signed by Davin David Jr, MD, 09/16/21, 3:23 PM CDT.

## 2021-09-16 NOTE — PROGRESS NOTES
BERNARDINO Erazo APRN        Internal Medicine Progress Note    9/16/2021   15:36 CDT    Name:  Reggie Treviño  MRN:    [id number]     Acct:     [id number]   Room:  [address] [place] [address] Day: 0     Admit Date: 9/1/2021  8:21 PM  PCP: Terrell Navas DO    Subjective:     C/C: weakness, cough    Interval History: Status:  stayed the same.  Up to chair.  No family at bedside. Tolerating decker trach.  C/o some throat discomfort. 02 sats stable with 02 at 2 lpm per nasal cannula.  Progressing with therapy. Low grade temp this morning. Anxious about discharge/discharge plan.     Review of Systems   Constitutional: Positive for malaise/fatigue. Negative for chills, decreased appetite, weight gain and weight loss.   HENT: Negative for congestion, ear discharge, hoarse voice and tinnitus.    Eyes: Negative for blurred vision, discharge, visual disturbance and visual halos.   Cardiovascular: Negative for chest pain, claudication, dyspnea on exertion, irregular heartbeat, leg swelling, orthopnea and paroxysmal nocturnal dyspnea.   Respiratory: Positive for cough. Negative for shortness of breath, sputum production and wheezing.    Endocrine: Negative for cold intolerance, heat intolerance and polyuria.   Hematologic/Lymphatic: Negative for adenopathy. Does not bruise/bleed easily.   Skin: Negative for dry skin, itching and suspicious lesions.   Musculoskeletal: Negative for arthritis, back pain, falls, joint pain, muscle weakness and myalgias.   Gastrointestinal: Negative for abdominal pain, constipation, diarrhea, dysphagia and hematemesis.   Genitourinary: Negative for bladder incontinence, dysuria and frequency.   Neurological: Positive for weakness. Negative for aphonia, disturbances in coordination and dizziness.   Psychiatric/Behavioral: Negative for altered mental status, depression, memory loss and substance abuse. The patient does not have insomnia and is not nervous/anxious.           Medications:     Allergies:   Allergies   Allergen Reactions   • Sulfa Antibiotics Rash       Current Meds:   Current Facility-Administered Medications:   •  acetaminophen (TYLENOL) tablet 500 mg, 500 mg, Oral, Q8H PRN, Jordyn Gómez APRN  •  albuterol sulfate HFA (PROVENTIL HFA;VENTOLIN HFA;PROAIR HFA) inhaler 6 puff, 6 puff, Inhalation, Q4H PRN, Shahab Garcia MD  •  artificial tears (REFRESH LACRI-LUBE) ophthalmic ointment, , Both Eyes, 4x Daily, Shahab Garcia MD  •  bacitracin ointment, , Topical, Q12H, Shahab Garcia MD  •  dextromethorphan polistirex ER (DELSYM) 30 MG/5ML oral suspension 60 mg, 60 mg, Oral, Q12H PRN, Debi Choi APRN  •  dextrose (D50W) 25 g/ 50mL Intravenous Solution 25 g, 25 g, Intravenous, Q15 Min PRN, Shahab Garcia MD  •  dextrose (GLUTOSE) oral gel 15 g, 15 g, Oral, Q15 Min PRN, Shahab Garcia MD  •  docusate (COLACE) 50 MG/5ML liquid 150 mg, 150 mg, Oral, Daily PRN, Shahab Garcia MD  •  famotidine (PEPCID) tablet 20 mg, 20 mg, Oral, Q12H, Shahab Garcia MD  •  glucagon (human recombinant) (GLUCAGEN DIAGNOSTIC) injection 1 mg, 1 mg, Subcutaneous, Q15 Min PRN, Shahab Garcia MD  •  HYDROcod Polst-CPM Polst ER (TUSSIONEX PENNKINETIC) 10-8 MG/5ML ER suspension 5 mL, 5 mL, Oral, Q12H PRN, Shahab Garcia MD  •  hypromellose (ISOPTO TEARS) 0.5 % ophthalmic solution 2 drop, 2 drop, Both Eyes, Q2H PRN, Shahab Garcia MD  •  ipratropium-albuterol (DUO-NEB) nebulizer solution 3 mL, 3 mL, Nebulization, Q6H PRN, Shahab Garcia MD  •  loperamide (IMODIUM) capsule 2 mg, 2 mg, Oral, 4x Daily PRN, Shahab Garcia MD  •  ondansetron (ZOFRAN) injection 4 mg, 4 mg, Intravenous, Q6H PRN **OR** ondansetron (ZOFRAN) tablet 4 mg, 4 mg, Oral, Q6H PRN, Shahab Garcia MD  •  polyethylene glycol (MIRALAX) packet 17 g, 17 g, Oral, Q12H PRN, Shahab Garcia MD  •  abe hopperi  "(FLORASTOR) capsule 250 mg, 250 mg, Oral, BID, Shahab Garcia MD  •  sodium chloride 0.9 % flush 10 mL, 10 mL, Intravenous, Q12H, Shahab Garcia MD  •  sodium chloride 0.9 % flush 10 mL, 10 mL, Intravenous, PRN, Shahab Garcia MD  •  traZODone (DESYREL) tablet 25 mg, 25 mg, Oral, Nightly PRN, Shahab Garcia MD    Data:     Code Status:    There are no questions and answers to display.       No family history on file.    Social History     Socioeconomic History   • Marital status:      Spouse name: Not on file   • Number of children: Not on file   • Years of education: Not on file   • Highest education level: Not on file   Tobacco Use   • Smoking status: Unknown If Ever Smoked       Vitals:  Ht 172.7 cm (68\")   Wt 117 kg (259 lb) Comment: Standing wt with therapy 9/16/21  BMI 39.38 kg/m²     T 99.0 P 96 R 20 /80 Sp02 98% (2 lpm)        I/O (24Hr):  No intake or output data in the 24 hours ending 09/16/21 1536    Labs and imaging:      Recent Results (from the past 12 hour(s))   Basic Metabolic Panel    Collection Time: 09/16/21  6:41 AM    Specimen: Blood   Result Value Ref Range    Glucose 95 65 - 99 mg/dL    BUN 6 6 - 20 mg/dL    Creatinine 0.43 (L) 0.76 - 1.27 mg/dL    Sodium 138 136 - 145 mmol/L    Potassium 4.1 3.5 - 5.2 mmol/L    Chloride 100 98 - 107 mmol/L    CO2 30.0 (H) 22.0 - 29.0 mmol/L    Calcium 9.5 8.6 - 10.5 mg/dL    eGFR Non African Amer >150 >60 mL/min/1.73    BUN/Creatinine Ratio 14.0 7.0 - 25.0    Anion Gap 8.0 5.0 - 15.0 mmol/L   BNP    Collection Time: 09/16/21  6:41 AM    Specimen: Blood   Result Value Ref Range    proBNP 12.1 0.0 - 450.0 pg/mL   CBC Auto Differential    Collection Time: 09/16/21  6:41 AM    Specimen: Blood   Result Value Ref Range    WBC 8.89 3.40 - 10.80 10*3/mm3    RBC 3.90 (L) 4.14 - 5.80 10*6/mm3    Hemoglobin 11.1 (L) 13.0 - 17.7 g/dL    Hematocrit 35.1 (L) 37.5 - 51.0 %    MCV 90.0 79.0 - 97.0 fL    MCH 28.5 26.6 - 33.0 pg "    MCHC 31.6 31.5 - 35.7 g/dL    RDW 15.6 (H) 12.3 - 15.4 %    RDW-SD 50.9 37.0 - 54.0 fl    MPV 8.8 6.0 - 12.0 fL    Platelets 202 140 - 450 10*3/mm3    Neutrophil % 72.5 42.7 - 76.0 %    Lymphocyte % 14.3 (L) 19.6 - 45.3 %    Monocyte % 9.2 5.0 - 12.0 %    Eosinophil % 3.5 0.3 - 6.2 %    Basophil % 0.2 0.0 - 1.5 %    Immature Grans % 0.3 0.0 - 0.5 %    Neutrophils, Absolute 6.44 1.70 - 7.00 10*3/mm3    Lymphocytes, Absolute 1.27 0.70 - 3.10 10*3/mm3    Monocytes, Absolute 0.82 0.10 - 0.90 10*3/mm3    Eosinophils, Absolute 0.31 0.00 - 0.40 10*3/mm3    Basophils, Absolute 0.02 0.00 - 0.20 10*3/mm3    Immature Grans, Absolute 0.03 0.00 - 0.05 10*3/mm3    nRBC 0.0 0.0 - 0.2 /100 WBC           Physical Examination:        Physical Exam  Vitals and nursing note reviewed.   Constitutional:       Appearance: He is well-developed.   HENT:      Head: Normocephalic and atraumatic.      Right Ear: External ear normal.      Left Ear: External ear normal.      Nose: Nose normal.      Mouth/Throat:      Mouth: Mucous membranes are moist.   Eyes:      Pupils: Pupils are equal, round, and reactive to light.   Neck:      Comments: Gibson trach  Cardiovascular:      Rate and Rhythm: Normal rate and regular rhythm.      Heart sounds: Normal heart sounds.   Pulmonary:      Effort: Pulmonary effort is normal.      Breath sounds: Normal breath sounds.   Abdominal:      General: Bowel sounds are normal.      Palpations: Abdomen is soft.      Comments: obese   Musculoskeletal:         General: Normal range of motion.      Cervical back: Normal range of motion and neck supple.      Comments: Generalized weakness   Skin:     General: Skin is warm and dry.   Neurological:      Mental Status: He is alert and oriented to person, place, and time.      Deep Tendon Reflexes: Reflexes are normal and symmetric.   Psychiatric:         Behavior: Behavior normal.           Assessment:             Acute tracheostomy management (CMS/[facility])    Acute  hypoxemic respiratory failure due to COVID-19 (CMS/[facility])    Obesity, morbid (CMS/[facility])    TODD (obstructive sleep apnea)    Past Medical History:   Diagnosis Date   • Kidney stone         Plan:        1. Acute hypoxic respiratory failure secondary to COVID 19  2. Dysphagia - improved  3. Bilateral chemosis  4. Stenotrophomonas pneumonia  5. Morbid obesity  6. PTSD  7. Hyperglycemia    Continue current treatment. Monitor counts. Increase activity. Labs Monday.  Aggressive therapies as tolerated. Oxygen weaning as tolerated. Glycemic control. Maintain patient safety. Trach management per ENT. Cough suppressants as needed. Educate on trach care in anticipation of discharge to home with trach. Discussed with respiratory therapy -options for discharge with outpatient sleep study with trach.       Electronically signed by SRIKANTH Fulton on 9/16/2021 at 15:36 CDT

## 2021-09-17 PROCEDURE — 97164 PT RE-EVAL EST PLAN CARE: CPT | Performed by: PHYSICAL THERAPIST

## 2021-09-17 PROCEDURE — 97116 GAIT TRAINING THERAPY: CPT | Performed by: PHYSICAL THERAPIST

## 2021-09-17 PROCEDURE — 31615 TRCHEOBRNCHSC EST TRACHS INC: CPT | Performed by: OTOLARYNGOLOGY

## 2021-09-17 PROCEDURE — 31502 CHANGE OF WINDPIPE AIRWAY: CPT | Performed by: OTOLARYNGOLOGY

## 2021-09-17 PROCEDURE — 97535 SELF CARE MNGMENT TRAINING: CPT

## 2021-09-17 PROCEDURE — 99232 SBSQ HOSP IP/OBS MODERATE 35: CPT | Performed by: OTOLARYNGOLOGY

## 2021-09-17 NOTE — PROGRESS NOTES
BERNARDINO Erazo APRN        Internal Medicine Progress Note    9/17/2021   14:02 CDT    Name:  Reggie Treviño  MRN:    [id number]     Acct:     [id number]   Room:  [address] [place] [address] Day: 0     Admit Date: 9/1/2021  8:21 PM  PCP: Terrell Navas DO    Subjective:     C/C: weakness, cough    Interval History: Status:  stayed the same.  Up to chair.  No family at bedside. Tolerating decker trach. Less throat discomfort. 02 sats stable with 02 at 2 lpm per nasal cannula.  Progressing with therapy. Afebrile. Anxious about discharge/discharge plan.     Review of Systems   Constitutional: Positive for malaise/fatigue. Negative for chills, decreased appetite, weight gain and weight loss.   HENT: Negative for congestion, ear discharge, hoarse voice and tinnitus.    Eyes: Negative for blurred vision, discharge, visual disturbance and visual halos.   Cardiovascular: Negative for chest pain, claudication, dyspnea on exertion, irregular heartbeat, leg swelling, orthopnea and paroxysmal nocturnal dyspnea.   Respiratory: Positive for cough. Negative for shortness of breath, sputum production and wheezing.    Endocrine: Negative for cold intolerance, heat intolerance and polyuria.   Hematologic/Lymphatic: Negative for adenopathy. Does not bruise/bleed easily.   Skin: Negative for dry skin, itching and suspicious lesions.   Musculoskeletal: Negative for arthritis, back pain, falls, joint pain, muscle weakness and myalgias.   Gastrointestinal: Negative for abdominal pain, constipation, diarrhea, dysphagia and hematemesis.   Genitourinary: Negative for bladder incontinence, dysuria and frequency.   Neurological: Positive for weakness. Negative for aphonia, disturbances in coordination and dizziness.   Psychiatric/Behavioral: Negative for altered mental status, depression, memory loss and substance abuse. The patient does not have insomnia and is not nervous/anxious.          Medications:      Allergies:   Allergies   Allergen Reactions   • Sulfa Antibiotics Rash       Current Meds:   Current Facility-Administered Medications:   •  acetaminophen (TYLENOL) tablet 500 mg, 500 mg, Oral, Q8H PRN, Jordyn Gómez APRN  •  albuterol sulfate HFA (PROVENTIL HFA;VENTOLIN HFA;PROAIR HFA) inhaler 6 puff, 6 puff, Inhalation, Q4H PRN, Shahab Garcia MD  •  artificial tears (REFRESH LACRI-LUBE) ophthalmic ointment, , Both Eyes, 4x Daily, Shahab Garcia MD  •  bacitracin ointment, , Topical, Q12H, Shahab Garcia MD  •  dextromethorphan polistirex ER (DELSYM) 30 MG/5ML oral suspension 60 mg, 60 mg, Oral, Q12H PRN, Debi Choi APRN  •  dextrose (D50W) 25 g/ 50mL Intravenous Solution 25 g, 25 g, Intravenous, Q15 Min PRN, Shahab Garcia MD  •  dextrose (GLUTOSE) oral gel 15 g, 15 g, Oral, Q15 Min PRN, Shahab Garcia MD  •  docusate (COLACE) 50 MG/5ML liquid 150 mg, 150 mg, Oral, Daily PRN, Shahab Garcia MD  •  famotidine (PEPCID) tablet 20 mg, 20 mg, Oral, Q12H, Shahab Garcia MD  •  glucagon (human recombinant) (GLUCAGEN DIAGNOSTIC) injection 1 mg, 1 mg, Subcutaneous, Q15 Min PRN, Shahab Garcia MD  •  HYDROcod Polst-CPM Polst ER (TUSSIONEX PENNKINETIC) 10-8 MG/5ML ER suspension 5 mL, 5 mL, Oral, Q12H PRN, Shahab Garcia MD  •  hypromellose (ISOPTO TEARS) 0.5 % ophthalmic solution 2 drop, 2 drop, Both Eyes, Q2H PRN, Shahab Garcia MD  •  ipratropium-albuterol (DUO-NEB) nebulizer solution 3 mL, 3 mL, Nebulization, Q6H PRN, Shahab Garcia MD  •  loperamide (IMODIUM) capsule 2 mg, 2 mg, Oral, 4x Daily PRN, Shahab Garcia MD  •  ondansetron (ZOFRAN) injection 4 mg, 4 mg, Intravenous, Q6H PRN **OR** ondansetron (ZOFRAN) tablet 4 mg, 4 mg, Oral, Q6H PRN, Shahab Garcia MD  •  polyethylene glycol (MIRALAX) packet 17 g, 17 g, Oral, Q12H PRN, Shahab Garcia MD  •  saccharomyces boulardii (FLORASTOR) capsule  "250 mg, 250 mg, Oral, BID, Shahab Garcia MD  •  sodium chloride 0.9 % flush 10 mL, 10 mL, Intravenous, Q12H, Shahab Garcia MD  •  sodium chloride 0.9 % flush 10 mL, 10 mL, Intravenous, PRN, Shahab Garcia MD  •  traZODone (DESYREL) tablet 25 mg, 25 mg, Oral, Nightly PRN, Shahab Garcia MD    Data:     Code Status:    There are no questions and answers to display.       No family history on file.    Social History     Socioeconomic History   • Marital status:      Spouse name: Not on file   • Number of children: Not on file   • Years of education: Not on file   • Highest education level: Not on file   Tobacco Use   • Smoking status: Unknown If Ever Smoked       Vitals:  Ht 172.7 cm (68\")   Wt 117 kg (259 lb) Comment: Standing wt with therapy 9/16/21  BMI 39.38 kg/m²     T 98.3 P 99 R 16 /78 Sp02 96% (2 lpm)        I/O (24Hr):  No intake or output data in the 24 hours ending 09/17/21 1402    Labs and imaging:      No results found for this or any previous visit (from the past 12 hour(s)).        Physical Examination:        Physical Exam  Vitals and nursing note reviewed.   Constitutional:       Appearance: He is well-developed.   HENT:      Head: Normocephalic and atraumatic.      Right Ear: External ear normal.      Left Ear: External ear normal.      Nose: Nose normal.      Mouth/Throat:      Mouth: Mucous membranes are moist.   Eyes:      Pupils: Pupils are equal, round, and reactive to light.   Neck:      Comments: Gibson trach  Cardiovascular:      Rate and Rhythm: Normal rate and regular rhythm.      Heart sounds: Normal heart sounds.   Pulmonary:      Effort: Pulmonary effort is normal.      Breath sounds: Normal breath sounds.   Abdominal:      General: Bowel sounds are normal.      Palpations: Abdomen is soft.      Comments: obese   Musculoskeletal:         General: Normal range of motion.      Cervical back: Normal range of motion and neck supple.      " Comments: Generalized weakness   Skin:     General: Skin is warm and dry.   Neurological:      Mental Status: He is alert and oriented to person, place, and time.      Deep Tendon Reflexes: Reflexes are normal and symmetric.   Psychiatric:         Behavior: Behavior normal.           Assessment:             Acute tracheostomy management (CMS/[facility])    Acute hypoxemic respiratory failure due to COVID-19 (CMS/[facility])    Obesity, morbid (CMS/[facility])    TODD (obstructive sleep apnea)    Past Medical History:   Diagnosis Date   • Kidney stone         Plan:        1. Acute hypoxic respiratory failure secondary to COVID 19  2. Dysphagia - improved  3. Bilateral chemosis  4. Stenotrophomonas pneumonia  5. Morbid obesity  6. PTSD  7. Hyperglycemia    Continue current treatment. Monitor counts. Increase activity. Labs Monday.  Aggressive therapies as tolerated. Oxygen weaning as tolerated. Glycemic control. Maintain patient safety. Trach management per ENT.  Educate on trach care in anticipation of discharge to home with trach. Discharge planning.       Electronically signed by SRIKANTH Fulton on 9/17/2021 at 14:02 CDT   I have discussed the care of Reggie Treviño, including pertinent history and exam findings, with the nurse practitioner.    I have seen and examined the patient and the key elements of all parts of the encounter have been performed by me.  I agree with the assessment, plan and orders as documented by SRIKANTH Delgado, after I modified the exam findings and the plan of treatments and the final version is my approved version of the assessment.        Electronically signed by Shahab Garcia MD on 9/17/2021 at 15:59 CDT

## 2021-09-17 NOTE — PROCEDURES
[facility] Otolaryngology Head and Neck Surgery  PROCEDURE NOTE    Anesthesia: none    Indications for Procedure:     Acute tracheostomy management (CMS/[facility])    Acute hypoxemic respiratory failure due to COVID-19 (CMS/HCC)    Obesity, morbid (CMS/HCC)    TODD (obstructive sleep apnea)    Endoscopy Type: Flexible Tracheobronchoscopy    Indications for Procedure:   Tracheostomy evaluation  Trachea evaluation    Procedure Details:    The patient was placed in the sitting position.  The SCOPE TYPE: Flexible laryngoscope was passed both via the tracheostome and the tracheostomy tube .  The trachea from the subglottis to the terese was examined.  A retrograde examination of the Vocal cords and subglottis was carried out during respiration and phonation.  The following findings were noted:    Findings:   TRACHEO-BRONCHOSCOPY:    Stoma: Expected healing, trach displaced anteriorly from internal stoma, corrected   Distal Trachea: Intact, no scarring, no secretions, terese sharp, MSB patent   Proximal trachea: Intact, no scarring   True Vocal cords: Mobile from below   Subglottis: No scarring, normal mucosa     Condition:  Stable.  Patient tolerated procedure well.    Complications:  None    Procedure:  Tracheostomy tube change    Procedure Details:    The patient's respiratory status was assessed.  The trach tube in position was assessed.  The patient was positioned.  The trach tube was removed without difficulty. The stoma and trachea were inspected.    A new tracheostomy tube was re- inserted. Trach tube position was confirmed with scope. Gibson trach cannula Size  10    Findings: See above    Condition:  Stable.  Patient tolerated procedure well.    Complications:  None    Post-procedure instructions reviewed with Patient, Spouse, RT     Electronically signed by Davin David Jr, MD, 09/17/21, 1:51 PM CDT.

## 2021-09-17 NOTE — PROGRESS NOTES
[facility] Otolaryngology Head and Neck Surgery  INPATIENT PROGRESS NOTE    Patient Name: Reggie Treviño  : 1985   MRN: [id number]  Date of Admission: 2021  Today's Date: 21  Length of Stay: 0  574/1    Shahab Garcia MD   Primary Care Physician: Terrell Navas,   Surgical Procedures Since Admission:    Subjective   Subjective   Chief Complaint: Trach management  HPI   Accompanied by: Wife, RT  Since last examined, Reggie Treviño has remained with a trach in position.  He is continuing with daytime capping and nighttime open trach.  He is able to speak well.  RT reports that the trach came out earlier today.  They report that they replaced it.  The patient is able to vocalize with the trach uncapped.  Patient is seen, chart is reviewed    Review of Systems   No change from prior inquiry  All pertinent negatives and positives are as above. All other systems have been reviewed and are negative unless otherwise stated.   Objective   Objective   Vitals:        Physical Exam  Constitutional:       General: He is not in acute distress.     Appearance: He is well-developed. He is obese.      Interventions: He is intubated.      Comments: Sitting up in chair, trach in neck, capped, nasal O2   HENT:      Head: Normocephalic and atraumatic.      Comments: Face-adiposis     Right Ear: External ear normal.      Left Ear: External ear normal.      Nose: Nose normal.      Comments: Wearing nasal O2     Mouth/Throat:      Mouth: Mucous membranes are dry. No injury.      Dentition: Normal dentition. No gum lesions.      Tongue: No lesions. Tongue does not deviate from midline.      Comments: Macroglossia-severe  Prolapse-severe  OP exam deferred  Eyes:      General: Lids are normal.      Conjunctiva/sclera: Conjunctivae normal.   Neck:      Trachea: Tracheostomy present.      Comments: Larynx-very low    TRACHEOSTOMY SITE:    Tracheostomy tube type: Gibson #10 tracheal  cannula not in the internal stoma   stoma: Healing well  Capping: Tolerating well    Voice: Good when trach is open  Date placed: 7/30/2021  Date last changed: 9/17/2021  See scope procedure for detail  Pulmonary:      Effort: No tachypnea or respiratory distress. He is intubated.      Comments: Trach in neck, nasal O2  Musculoskeletal:      Cervical back: Decreased range of motion.   Neurological:      Mental Status: He is alert and oriented to person, place, and time.      Cranial Nerves: Cranial nerves are intact.   Psychiatric:         Attention and Perception: Attention and perception normal.         Mood and Affect: Mood and affect normal.         Speech: Speech normal.         Behavior: Behavior normal. Behavior is cooperative.         Cognition and Memory: Cognition and memory normal.           Result Review    Result Review:  I have personally reviewed the results from the time of this admission to 9/17/2021 13:43 CDT and agree with these findings:  []  Laboratory  []  Microbiology  []  Radiology  []  EKG/Telemetry   []  Cardiology/Vascular   []  Pathology  [x]  Old records  []  Other:  Most notable findings include: None pertinent to ENT today.  Progress Notes by Debi Choi APRN (09/16/2021 12:55)       Assessment/Plan   Assessment / Plan   Brief Patient Summary:  Reggie Treviño is a 36 y.o. male who remains a trach in position.  His trach is displaced.  I have corrected this and placed the trach back in good position.  Using the flexible laryngoscope, position is confirmed.    Active Hospital Problems:   Active Hospital Problems    Diagnosis    • Acute tracheostomy management (CMS/[facility])    • Acute hypoxemic respiratory failure due to COVID-19 (CMS/[facility])    • Obesity, morbid (CMS/[facility])    • TODD (obstructive sleep apnea)      Plan:   • Trach management-the trach is placed back in good position today.  The patient will continue current trach care.  He will continue daytime capping and nighttime open  trach for sleep apnea.  • Hypoventilation obesity syndrome-patient continues to do well with trach in position at this point time.  • Respiratory failure-patient continues to improve.  Per primary team.  • Covid pneumonia-patient continues to recover from this.  Per primary team  Discussed Plan with patient, wife, RT  Following patient as in-patient. Further recommendations will be made based on serial examinations.    Medications/Orders for this encounter: No new medications ordered.  No New orders written.    Discharge Planning: Per primary team        DVT prophylaxis:  No DVT prophylaxis order currently exists.     Electronically signed by Davin David Jr, MD, 09/17/21, 1:43 PM CDT.

## 2021-09-18 PROCEDURE — 97116 GAIT TRAINING THERAPY: CPT

## 2021-09-19 PROCEDURE — 97116 GAIT TRAINING THERAPY: CPT

## 2021-09-19 NOTE — PROGRESS NOTES
BERNARDINO Erazo APRN        Internal Medicine Progress Note    9/18/2021   19:35 CDT    Name:  Reggie Treviño  MRN:    [id number]     Acct:     [id number]   Room:  [address] [place] [address] Day: 0     Admit Date: 9/1/2021  8:21 PM  PCP: Terrell Navas DO    Subjective:     C/C: weakness, cough    Interval History: Status:  stayed the same.  Up to chair.  No family at bedside. Tolerating decker trach. Less throat discomfort. 02 sats stable with 02 at 2 lpm per nasal cannula.  Progressing with therapy. Afebrile. Anxious about discharge/discharge plan.     Review of Systems   Constitutional: Positive for malaise/fatigue. Negative for chills, decreased appetite, weight gain and weight loss.   HENT: Negative for congestion, ear discharge, hoarse voice and tinnitus.    Eyes: Negative for blurred vision, discharge, visual disturbance and visual halos.   Cardiovascular: Negative for chest pain, claudication, dyspnea on exertion, irregular heartbeat, leg swelling, orthopnea and paroxysmal nocturnal dyspnea.   Respiratory: Positive for cough. Negative for shortness of breath, sputum production and wheezing.    Endocrine: Negative for cold intolerance, heat intolerance and polyuria.   Hematologic/Lymphatic: Negative for adenopathy. Does not bruise/bleed easily.   Skin: Negative for dry skin, itching and suspicious lesions.   Musculoskeletal: Negative for arthritis, back pain, falls, joint pain, muscle weakness and myalgias.   Gastrointestinal: Negative for abdominal pain, constipation, diarrhea, dysphagia and hematemesis.   Genitourinary: Negative for bladder incontinence, dysuria and frequency.   Neurological: Positive for weakness. Negative for aphonia, disturbances in coordination and dizziness.   Psychiatric/Behavioral: Negative for altered mental status, depression, memory loss and substance abuse. The patient does not have insomnia and is not nervous/anxious.          Medications:      Allergies:   Allergies   Allergen Reactions   • Sulfa Antibiotics Rash       Current Meds:   Current Facility-Administered Medications:   •  acetaminophen (TYLENOL) tablet 500 mg, 500 mg, Oral, Q8H PRN, Jordyn Gómez APRN  •  albuterol sulfate HFA (PROVENTIL HFA;VENTOLIN HFA;PROAIR HFA) inhaler 6 puff, 6 puff, Inhalation, Q4H PRN, Shahab Garcia MD  •  artificial tears (REFRESH LACRI-LUBE) ophthalmic ointment, , Both Eyes, 4x Daily, Shahab Garcia MD  •  bacitracin ointment, , Topical, Q12H, Shahab Garcia MD  •  dextromethorphan polistirex ER (DELSYM) 30 MG/5ML oral suspension 60 mg, 60 mg, Oral, Q12H PRN, Debi Choi APRN  •  dextrose (D50W) 25 g/ 50mL Intravenous Solution 25 g, 25 g, Intravenous, Q15 Min PRN, Shahab Garcia MD  •  dextrose (GLUTOSE) oral gel 15 g, 15 g, Oral, Q15 Min PRN, Shahab Garcia MD  •  docusate (COLACE) 50 MG/5ML liquid 150 mg, 150 mg, Oral, Daily PRN, Shahab Garcia MD  •  famotidine (PEPCID) tablet 20 mg, 20 mg, Oral, Q12H, Shahab Garcia MD  •  glucagon (human recombinant) (GLUCAGEN DIAGNOSTIC) injection 1 mg, 1 mg, Subcutaneous, Q15 Min PRN, Shahab Garcia MD  •  HYDROcod Polst-CPM Polst ER (TUSSIONEX PENNKINETIC) 10-8 MG/5ML ER suspension 5 mL, 5 mL, Oral, Q12H PRN, Shahab Garcia MD  •  hypromellose (ISOPTO TEARS) 0.5 % ophthalmic solution 2 drop, 2 drop, Both Eyes, Q2H PRN, Shahab Garcia MD  •  ipratropium-albuterol (DUO-NEB) nebulizer solution 3 mL, 3 mL, Nebulization, Q6H PRN, Shahab Garcia MD  •  loperamide (IMODIUM) capsule 2 mg, 2 mg, Oral, 4x Daily PRN, Shahab Garcia MD  •  ondansetron (ZOFRAN) injection 4 mg, 4 mg, Intravenous, Q6H PRN **OR** ondansetron (ZOFRAN) tablet 4 mg, 4 mg, Oral, Q6H PRN, Shahab Garcia MD  •  polyethylene glycol (MIRALAX) packet 17 g, 17 g, Oral, Q12H PRN, Shahab Garcia MD  •  saccharomyces boulardii (FLORASTOR) capsule  "250 mg, 250 mg, Oral, BID, Shahab Garcia MD  •  sodium chloride 0.9 % flush 10 mL, 10 mL, Intravenous, Q12H, Shahab Garcia MD  •  sodium chloride 0.9 % flush 10 mL, 10 mL, Intravenous, PRN, Shahab Garcia MD  •  traZODone (DESYREL) tablet 25 mg, 25 mg, Oral, Nightly PRN, Shahab Garcia MD    Data:     Code Status:    There are no questions and answers to display.       No family history on file.    Social History     Socioeconomic History   • Marital status:      Spouse name: Not on file   • Number of children: Not on file   • Years of education: Not on file   • Highest education level: Not on file   Tobacco Use   • Smoking status: Unknown If Ever Smoked       Vitals:  Ht 172.7 cm (68\")   Wt 117 kg (259 lb) Comment: Standing wt with therapy 9/16/21  BMI 39.38 kg/m²     T 97.6 P 59 R 18 /72 Sp02 94% (2 lpm)        I/O (24Hr):  No intake or output data in the 24 hours ending 09/18/21 1935    Labs and imaging:      No results found for this or any previous visit (from the past 12 hour(s)).        Physical Examination:        Physical Exam  Vitals and nursing note reviewed.   Constitutional:       Appearance: He is well-developed.   HENT:      Head: Normocephalic and atraumatic.      Right Ear: External ear normal.      Left Ear: External ear normal.      Nose: Nose normal.      Mouth/Throat:      Mouth: Mucous membranes are moist.   Eyes:      Pupils: Pupils are equal, round, and reactive to light.   Neck:      Comments: Gibson trach  Cardiovascular:      Rate and Rhythm: Normal rate and regular rhythm.      Heart sounds: Normal heart sounds.   Pulmonary:      Effort: Pulmonary effort is normal.      Breath sounds: Normal breath sounds.   Abdominal:      General: Bowel sounds are normal.      Palpations: Abdomen is soft.      Comments: obese   Musculoskeletal:         General: Normal range of motion.      Cervical back: Normal range of motion and neck supple.      " Comments: Generalized weakness   Skin:     General: Skin is warm and dry.   Neurological:      Mental Status: He is alert and oriented to person, place, and time.      Deep Tendon Reflexes: Reflexes are normal and symmetric.   Psychiatric:         Behavior: Behavior normal.           Assessment:             Acute tracheostomy management (CMS/[facility])    Acute hypoxemic respiratory failure due to COVID-19 (CMS/[facility])    Obesity, morbid (CMS/[facility])    TODD (obstructive sleep apnea)    Past Medical History:   Diagnosis Date   • Kidney stone         Plan:        1. Acute hypoxic respiratory failure secondary to COVID 19  2. Dysphagia - improved  3. Bilateral chemosis  4. Stenotrophomonas pneumonia  5. Morbid obesity  6. PTSD  7. Hyperglycemia    Continue current treatment. Monitor counts. Increase activity. Labs Monday.  Aggressive therapies as tolerated. Oxygen weaning as tolerated. Glycemic control. Maintain patient safety. Trach management per ENT, Dr. David performed bedside flexible tracheobronchoscopy yesterday that showed proper trach healing.  Educate on trach care in anticipation of discharge to home with trach. Discharge planning.       Electronically signed by SRIKANTH Ashley on 9/18/2021 at 19:35 CDT

## 2021-09-19 NOTE — PROGRESS NOTES
BERNARDINO Erazo APRN        Internal Medicine Progress Note    9/19/2021   12:24 CDT    Name:  Reggie Treviño  MRN:    [id number]     Acct:     [id number]   Room:  [address] [place] [address] Day: 0     Admit Date: 9/1/2021  8:21 PM  PCP: Terrell Navas DO    Subjective:     C/C: weakness, cough    Interval History: Status:  stayed the same.  Up to chair.  No family at bedside. Tolerating decker trach. Less throat discomfort. 02 sats stable with 02 at 2 lpm per nasal cannula.  Progressing with therapy. Afebrile. Anxious about discharge/discharge plan, wants to go home this coming week.     Review of Systems   Constitutional: Positive for malaise/fatigue. Negative for chills, decreased appetite, weight gain and weight loss.   HENT: Negative for congestion, ear discharge, hoarse voice and tinnitus.    Eyes: Negative for blurred vision, discharge, visual disturbance and visual halos.   Cardiovascular: Negative for chest pain, claudication, dyspnea on exertion, irregular heartbeat, leg swelling, orthopnea and paroxysmal nocturnal dyspnea.   Respiratory: Positive for cough. Negative for shortness of breath, sputum production and wheezing.    Endocrine: Negative for cold intolerance, heat intolerance and polyuria.   Hematologic/Lymphatic: Negative for adenopathy. Does not bruise/bleed easily.   Skin: Negative for dry skin, itching and suspicious lesions.   Musculoskeletal: Negative for arthritis, back pain, falls, joint pain, muscle weakness and myalgias.   Gastrointestinal: Negative for abdominal pain, constipation, diarrhea, dysphagia and hematemesis.   Genitourinary: Negative for bladder incontinence, dysuria and frequency.   Neurological: Positive for weakness. Negative for aphonia, disturbances in coordination and dizziness.   Psychiatric/Behavioral: Negative for altered mental status, depression, memory loss and substance abuse. The patient does not have insomnia and is not  nervous/anxious.          Medications:     Allergies:   Allergies   Allergen Reactions   • Sulfa Antibiotics Rash       Current Meds:   Current Facility-Administered Medications:   •  acetaminophen (TYLENOL) tablet 500 mg, 500 mg, Oral, Q8H PRN, Jordyn Gómez APRN  •  albuterol sulfate HFA (PROVENTIL HFA;VENTOLIN HFA;PROAIR HFA) inhaler 6 puff, 6 puff, Inhalation, Q4H PRN, Shahab Garcia MD  •  artificial tears (REFRESH LACRI-LUBE) ophthalmic ointment, , Both Eyes, 4x Daily, Shahab Garcia MD  •  bacitracin ointment, , Topical, Q12H, Shahab Garcia MD  •  dextromethorphan polistirex ER (DELSYM) 30 MG/5ML oral suspension 60 mg, 60 mg, Oral, Q12H PRN, Debi Choi APRN  •  dextrose (D50W) 25 g/ 50mL Intravenous Solution 25 g, 25 g, Intravenous, Q15 Min PRN, Shahab Garcia MD  •  dextrose (GLUTOSE) oral gel 15 g, 15 g, Oral, Q15 Min PRN, Shahab Garcia MD  •  docusate (COLACE) 50 MG/5ML liquid 150 mg, 150 mg, Oral, Daily PRN, Shahab Garcia MD  •  famotidine (PEPCID) tablet 20 mg, 20 mg, Oral, Q12H, Shahab Garcia MD  •  glucagon (human recombinant) (GLUCAGEN DIAGNOSTIC) injection 1 mg, 1 mg, Subcutaneous, Q15 Min PRN, Shahab Garcia MD  •  HYDROcod Polst-CPM Polst ER (TUSSIONEX PENNKINETIC) 10-8 MG/5ML ER suspension 5 mL, 5 mL, Oral, Q12H PRN, Shahab Garcia MD  •  hypromellose (ISOPTO TEARS) 0.5 % ophthalmic solution 2 drop, 2 drop, Both Eyes, Q2H PRN, Shahab Garcia MD  •  ipratropium-albuterol (DUO-NEB) nebulizer solution 3 mL, 3 mL, Nebulization, Q6H PRN, Shahab Garcia MD  •  loperamide (IMODIUM) capsule 2 mg, 2 mg, Oral, 4x Daily PRN, Shahab Garcia MD  •  ondansetron (ZOFRAN) injection 4 mg, 4 mg, Intravenous, Q6H PRN **OR** ondansetron (ZOFRAN) tablet 4 mg, 4 mg, Oral, Q6H PRN, Shahab Garcia MD  •  polyethylene glycol (MIRALAX) packet 17 g, 17 g, Oral, Q12H PRN, Shahab Garcia MD  •   "saccharomyces boulardii (FLORASTOR) capsule 250 mg, 250 mg, Oral, BID, Shahab Garcia MD  •  sodium chloride 0.9 % flush 10 mL, 10 mL, Intravenous, Q12H, Shahab Garcia MD  •  sodium chloride 0.9 % flush 10 mL, 10 mL, Intravenous, PRN, Shahab Garcia MD  •  traZODone (DESYREL) tablet 25 mg, 25 mg, Oral, Nightly PRN, Shahab Garcia MD    Data:     Code Status:    There are no questions and answers to display.       No family history on file.    Social History     Socioeconomic History   • Marital status:      Spouse name: Not on file   • Number of children: Not on file   • Years of education: Not on file   • Highest education level: Not on file   Tobacco Use   • Smoking status: Unknown If Ever Smoked       Vitals:  Ht 172.7 cm (68\")   Wt 117 kg (259 lb) Comment: Standing wt with therapy 9/16/21  BMI 39.38 kg/m²     T 98.2 P 97 R 18 /81 Sp02 97% (2 lpm)        I/O (24Hr):  No intake or output data in the 24 hours ending 09/19/21 1224    Labs and imaging:      No results found for this or any previous visit (from the past 12 hour(s)).        Physical Examination:        Physical Exam  Vitals and nursing note reviewed.   Constitutional:       Appearance: He is well-developed.   HENT:      Head: Normocephalic and atraumatic.      Right Ear: External ear normal.      Left Ear: External ear normal.      Nose: Nose normal.      Mouth/Throat:      Mouth: Mucous membranes are moist.   Eyes:      Pupils: Pupils are equal, round, and reactive to light.   Neck:      Comments: Gibson trach  Cardiovascular:      Rate and Rhythm: Normal rate and regular rhythm.      Heart sounds: Normal heart sounds.   Pulmonary:      Effort: Pulmonary effort is normal.      Breath sounds: Normal breath sounds.   Abdominal:      General: Bowel sounds are normal.      Palpations: Abdomen is soft.      Comments: obese   Musculoskeletal:         General: Normal range of motion.      Cervical back: " Normal range of motion and neck supple.      Comments: Generalized weakness   Skin:     General: Skin is warm and dry.   Neurological:      Mental Status: He is alert and oriented to person, place, and time.      Deep Tendon Reflexes: Reflexes are normal and symmetric.   Psychiatric:         Behavior: Behavior normal.           Assessment:             Acute tracheostomy management (CMS/[facility])    Acute hypoxemic respiratory failure due to COVID-19 (CMS/[facility])    Obesity, morbid (CMS/[facility])    TODD (obstructive sleep apnea)    Past Medical History:   Diagnosis Date   • Kidney stone         Plan:        1. Acute hypoxic respiratory failure secondary to COVID 19  2. Dysphagia - improved  3. Bilateral chemosis  4. Stenotrophomonas pneumonia  5. Morbid obesity  6. PTSD  7. Hyperglycemia    Continue current treatment. Monitor counts. Increase activity. Labs Monday.  Aggressive therapies as tolerated. Oxygen weaning as tolerated. Glycemic control. Maintain patient safety. Trach management per ENT, Dr. David performed bedside flexible tracheobronchoscopy and trach exchange friday that showed proper trach healing.  Educate on trach care in anticipation of discharge to home with trach. Discharge planning.       Electronically signed by SRIKANTH Ashley on 9/19/2021 at 12:24 CDT

## 2021-09-20 VITALS — BODY MASS INDEX: 39.25 KG/M2 | HEIGHT: 68 IN | WEIGHT: 259 LBS

## 2021-09-20 LAB
ALBUMIN SERPL-MCNC: 3.9 G/DL (ref 3.5–5.2)
ALBUMIN/GLOB SERPL: 1.3 G/DL
ALP SERPL-CCNC: 91 U/L (ref 39–117)
ALT SERPL W P-5'-P-CCNC: 61 U/L (ref 1–41)
ANION GAP SERPL CALCULATED.3IONS-SCNC: 10 MMOL/L (ref 5–15)
AST SERPL-CCNC: 34 U/L (ref 1–40)
BASOPHILS # BLD AUTO: 0.03 10*3/MM3 (ref 0–0.2)
BASOPHILS NFR BLD AUTO: 0.5 % (ref 0–1.5)
BILIRUB SERPL-MCNC: 0.3 MG/DL (ref 0–1.2)
BUN SERPL-MCNC: 6 MG/DL (ref 6–20)
BUN/CREAT SERPL: 11.8 (ref 7–25)
CALCIUM SPEC-SCNC: 9.5 MG/DL (ref 8.6–10.5)
CHLORIDE SERPL-SCNC: 101 MMOL/L (ref 98–107)
CO2 SERPL-SCNC: 31 MMOL/L (ref 22–29)
CREAT SERPL-MCNC: 0.51 MG/DL (ref 0.76–1.27)
DEPRECATED RDW RBC AUTO: 48.4 FL (ref 37–54)
EOSINOPHIL # BLD AUTO: 0.3 10*3/MM3 (ref 0–0.4)
EOSINOPHIL NFR BLD AUTO: 5.2 % (ref 0.3–6.2)
ERYTHROCYTE [DISTWIDTH] IN BLOOD BY AUTOMATED COUNT: 14.7 % (ref 12.3–15.4)
GFR SERPL CREATININE-BSD FRML MDRD: >150 ML/MIN/1.73
GLOBULIN UR ELPH-MCNC: 3 GM/DL
GLUCOSE SERPL-MCNC: 96 MG/DL (ref 65–99)
HCT VFR BLD AUTO: 35.5 % (ref 37.5–51)
HGB BLD-MCNC: 11.1 G/DL (ref 13–17.7)
IMM GRANULOCYTES # BLD AUTO: 0.02 10*3/MM3 (ref 0–0.05)
IMM GRANULOCYTES NFR BLD AUTO: 0.3 % (ref 0–0.5)
LYMPHOCYTES # BLD AUTO: 1.61 10*3/MM3 (ref 0.7–3.1)
LYMPHOCYTES NFR BLD AUTO: 27.7 % (ref 19.6–45.3)
MCH RBC QN AUTO: 28.1 PG (ref 26.6–33)
MCHC RBC AUTO-ENTMCNC: 31.3 G/DL (ref 31.5–35.7)
MCV RBC AUTO: 89.9 FL (ref 79–97)
MONOCYTES # BLD AUTO: 0.53 10*3/MM3 (ref 0.1–0.9)
MONOCYTES NFR BLD AUTO: 9.1 % (ref 5–12)
NEUTROPHILS NFR BLD AUTO: 3.32 10*3/MM3 (ref 1.7–7)
NEUTROPHILS NFR BLD AUTO: 57.2 % (ref 42.7–76)
NRBC BLD AUTO-RTO: 0 /100 WBC (ref 0–0.2)
PLATELET # BLD AUTO: 235 10*3/MM3 (ref 140–450)
PMV BLD AUTO: 9.3 FL (ref 6–12)
POTASSIUM SERPL-SCNC: 3.8 MMOL/L (ref 3.5–5.2)
PROT SERPL-MCNC: 6.9 G/DL (ref 6–8.5)
RBC # BLD AUTO: 3.95 10*6/MM3 (ref 4.14–5.8)
SODIUM SERPL-SCNC: 142 MMOL/L (ref 136–145)
WBC # BLD AUTO: 5.81 10*3/MM3 (ref 3.4–10.8)

## 2021-09-20 PROCEDURE — 97116 GAIT TRAINING THERAPY: CPT

## 2021-09-20 PROCEDURE — 97535 SELF CARE MNGMENT TRAINING: CPT

## 2021-09-20 PROCEDURE — 99232 SBSQ HOSP IP/OBS MODERATE 35: CPT | Performed by: OTOLARYNGOLOGY

## 2021-09-20 PROCEDURE — 80053 COMPREHEN METABOLIC PANEL: CPT | Performed by: INTERNAL MEDICINE

## 2021-09-20 PROCEDURE — 85025 COMPLETE CBC W/AUTO DIFF WBC: CPT | Performed by: INTERNAL MEDICINE

## 2021-09-20 NOTE — PROGRESS NOTES
Jose Garcia M.D.  SRIKANTH Delgado        Internal Medicine Progress Note    9/20/2021   16:51 CDT    Name:  Reggie Treviño  MRN:    [id number]     Acct:     [id number]   Room:  [address] [place] [address] Day: 0     Admit Date: 9/1/2021  8:21 PM  PCP: Terrell Navas DO    Subjective:     C/C: weakness, cough    Interval History: Status:  stayed the same. Up to chair.  No family at bedside. Tolerating decker trach.  02 sats stable with 02 at 4 lpm per nasal cannula.  Progressing with therapy. Afebrile. Counts stable. Reports that ENT considering decannulation as he is tolerating 02 per nasal cannula with sleep.  Anxious about discharge/discharge plan. Hopeful for discharge to home later this week.      Review of Systems   Constitutional: Positive for malaise/fatigue. Negative for chills, decreased appetite, weight gain and weight loss.   HENT: Negative for congestion, ear discharge, hoarse voice and tinnitus.    Eyes: Negative for blurred vision, discharge, visual disturbance and visual halos.   Cardiovascular: Negative for chest pain, claudication, dyspnea on exertion, irregular heartbeat, leg swelling, orthopnea and paroxysmal nocturnal dyspnea.   Respiratory: Positive for cough. Negative for shortness of breath, sputum production and wheezing.    Endocrine: Negative for cold intolerance, heat intolerance and polyuria.   Hematologic/Lymphatic: Negative for adenopathy. Does not bruise/bleed easily.   Skin: Negative for dry skin, itching and suspicious lesions.   Musculoskeletal: Negative for arthritis, back pain, falls, joint pain, muscle weakness and myalgias.   Gastrointestinal: Negative for abdominal pain, constipation, diarrhea, dysphagia and hematemesis.   Genitourinary: Negative for bladder incontinence, dysuria and frequency.   Neurological: Positive for weakness. Negative for aphonia, disturbances in coordination and dizziness.   Psychiatric/Behavioral: Negative for altered mental  status, depression, memory loss and substance abuse. The patient does not have insomnia and is not nervous/anxious.          Medications:     Allergies:   Allergies   Allergen Reactions   • Sulfa Antibiotics Rash       Current Meds:   Current Facility-Administered Medications:   •  acetaminophen (TYLENOL) tablet 500 mg, 500 mg, Oral, Q8H PRN, Jordyn Gómez APRN  •  albuterol sulfate HFA (PROVENTIL HFA;VENTOLIN HFA;PROAIR HFA) inhaler 6 puff, 6 puff, Inhalation, Q4H PRN, Shahab Garcia MD  •  artificial tears (REFRESH LACRI-LUBE) ophthalmic ointment, , Both Eyes, 4x Daily, Shahab Garcia MD  •  bacitracin ointment, , Topical, Q12H, Shahab Garcia MD  •  dextromethorphan polistirex ER (DELSYM) 30 MG/5ML oral suspension 60 mg, 60 mg, Oral, Q12H PRN, Debi Choi APRN  •  dextrose (D50W) 25 g/ 50mL Intravenous Solution 25 g, 25 g, Intravenous, Q15 Min PRN, Shahab Garcia MD  •  dextrose (GLUTOSE) oral gel 15 g, 15 g, Oral, Q15 Min PRN, Shahab Garcia MD  •  docusate (COLACE) 50 MG/5ML liquid 150 mg, 150 mg, Oral, Daily PRN, Shahab Garcia MD  •  famotidine (PEPCID) tablet 20 mg, 20 mg, Oral, Q12H, Shahab Garcia MD  •  glucagon (human recombinant) (GLUCAGEN DIAGNOSTIC) injection 1 mg, 1 mg, Subcutaneous, Q15 Min PRN, Shahab Garcia MD  •  HYDROcod Polst-CPM Polst ER (TUSSIONEX PENNKINETIC) 10-8 MG/5ML ER suspension 5 mL, 5 mL, Oral, Q12H PRN, Shahab Garcia MD  •  hypromellose (ISOPTO TEARS) 0.5 % ophthalmic solution 2 drop, 2 drop, Both Eyes, Q2H PRN, Shahab Garcia MD  •  ipratropium-albuterol (DUO-NEB) nebulizer solution 3 mL, 3 mL, Nebulization, Q6H PRN, Shahab Garcia MD  •  loperamide (IMODIUM) capsule 2 mg, 2 mg, Oral, 4x Daily PRN, Shahab Garcia MD  •  ondansetron (ZOFRAN) injection 4 mg, 4 mg, Intravenous, Q6H PRN **OR** ondansetron (ZOFRAN) tablet 4 mg, 4 mg, Oral, Q6H PRN, Shahab Garcia MD  •   "polyethylene glycol (MIRALAX) packet 17 g, 17 g, Oral, Q12H PRN, Shahab Garcia MD  •  saccharomyces boulardii (FLORASTOR) capsule 250 mg, 250 mg, Oral, BID, Shahab Garcia MD  •  sodium chloride 0.9 % flush 10 mL, 10 mL, Intravenous, Q12H, Shahab Garcia MD  •  sodium chloride 0.9 % flush 10 mL, 10 mL, Intravenous, PRN, Shahab Garcia MD  •  traZODone (DESYREL) tablet 25 mg, 25 mg, Oral, Nightly PRN, Shahab Garcia MD    Data:     Code Status:    There are no questions and answers to display.       No family history on file.    Social History     Socioeconomic History   • Marital status:      Spouse name: Not on file   • Number of children: Not on file   • Years of education: Not on file   • Highest education level: Not on file   Tobacco Use   • Smoking status: Unknown If Ever Smoked       Vitals:  Ht 172.7 cm (68\")   Wt 117 kg (259 lb)   BMI 39.38 kg/m²     T 97.7 P 80 R 28 /78 Sp02 98% (4 lpm)        I/O (24Hr):  No intake or output data in the 24 hours ending 09/20/21 1651    Labs and imaging:      Recent Results (from the past 12 hour(s))   Comprehensive Metabolic Panel    Collection Time: 09/20/21  5:49 AM    Specimen: Blood   Result Value Ref Range    Glucose 96 65 - 99 mg/dL    BUN 6 6 - 20 mg/dL    Creatinine 0.51 (L) 0.76 - 1.27 mg/dL    Sodium 142 136 - 145 mmol/L    Potassium 3.8 3.5 - 5.2 mmol/L    Chloride 101 98 - 107 mmol/L    CO2 31.0 (H) 22.0 - 29.0 mmol/L    Calcium 9.5 8.6 - 10.5 mg/dL    Total Protein 6.9 6.0 - 8.5 g/dL    Albumin 3.90 3.50 - 5.20 g/dL    ALT (SGPT) 61 (H) 1 - 41 U/L    AST (SGOT) 34 1 - 40 U/L    Alkaline Phosphatase 91 39 - 117 U/L    Total Bilirubin 0.3 0.0 - 1.2 mg/dL    eGFR Non African Amer >150 >60 mL/min/1.73    Globulin 3.0 gm/dL    A/G Ratio 1.3 g/dL    BUN/Creatinine Ratio 11.8 7.0 - 25.0    Anion Gap 10.0 5.0 - 15.0 mmol/L   CBC Auto Differential    Collection Time: 09/20/21  5:49 AM    Specimen: Blood   Result " Value Ref Range    WBC 5.81 3.40 - 10.80 10*3/mm3    RBC 3.95 (L) 4.14 - 5.80 10*6/mm3    Hemoglobin 11.1 (L) 13.0 - 17.7 g/dL    Hematocrit 35.5 (L) 37.5 - 51.0 %    MCV 89.9 79.0 - 97.0 fL    MCH 28.1 26.6 - 33.0 pg    MCHC 31.3 (L) 31.5 - 35.7 g/dL    RDW 14.7 12.3 - 15.4 %    RDW-SD 48.4 37.0 - 54.0 fl    MPV 9.3 6.0 - 12.0 fL    Platelets 235 140 - 450 10*3/mm3    Neutrophil % 57.2 42.7 - 76.0 %    Lymphocyte % 27.7 19.6 - 45.3 %    Monocyte % 9.1 5.0 - 12.0 %    Eosinophil % 5.2 0.3 - 6.2 %    Basophil % 0.5 0.0 - 1.5 %    Immature Grans % 0.3 0.0 - 0.5 %    Neutrophils, Absolute 3.32 1.70 - 7.00 10*3/mm3    Lymphocytes, Absolute 1.61 0.70 - 3.10 10*3/mm3    Monocytes, Absolute 0.53 0.10 - 0.90 10*3/mm3    Eosinophils, Absolute 0.30 0.00 - 0.40 10*3/mm3    Basophils, Absolute 0.03 0.00 - 0.20 10*3/mm3    Immature Grans, Absolute 0.02 0.00 - 0.05 10*3/mm3    nRBC 0.0 0.0 - 0.2 /100 WBC           Physical Examination:        Physical Exam  Vitals and nursing note reviewed.   Constitutional:       Appearance: He is well-developed.   HENT:      Head: Normocephalic and atraumatic.      Right Ear: External ear normal.      Left Ear: External ear normal.      Nose: Nose normal.      Mouth/Throat:      Mouth: Mucous membranes are moist.   Eyes:      Pupils: Pupils are equal, round, and reactive to light.   Neck:      Comments: Gibson trach  Cardiovascular:      Rate and Rhythm: Normal rate and regular rhythm.      Heart sounds: Normal heart sounds.   Pulmonary:      Effort: Pulmonary effort is normal.      Breath sounds: Normal breath sounds.   Abdominal:      General: Bowel sounds are normal.      Palpations: Abdomen is soft.      Comments: obese   Musculoskeletal:         General: Normal range of motion.      Cervical back: Normal range of motion and neck supple.      Comments: Generalized weakness   Skin:     General: Skin is warm and dry.   Neurological:      Mental Status: He is alert and oriented to person,  place, and time.      Deep Tendon Reflexes: Reflexes are normal and symmetric.   Psychiatric:         Behavior: Behavior normal.           Assessment:             Acute tracheostomy management (CMS/[facility])    Acute hypoxemic respiratory failure due to COVID-19 (CMS/[facility])    Obesity, morbid (CMS/[facility])    TODD (obstructive sleep apnea)    Past Medical History:   Diagnosis Date   • Kidney stone         Plan:        1. Acute hypoxic respiratory failure secondary to COVID 19  2. Dysphagia - improved  3. Bilateral chemosis  4. Stenotrophomonas pneumonia  5. Morbid obesity  6. PTSD  7. Hyperglycemia    Continue current treatment. Monitor counts. Increase activity. Labs Thursday.  Aggressive therapies as tolerated. Oxygen weaning as tolerated. Glycemic control. Maintain patient safety. Trach management per ENT - possible decannulation prior to discharge.  Discharge planning.       Electronically signed by SRIKANTH Fulton on 9/20/2021 at 16:51 CDT   I have discussed the care of Reggie Treviño, including pertinent history and exam findings, with the nurse practitioner.    I have seen and examined the patient and the key elements of all parts of the encounter have been performed by me.  I agree with the assessment, plan and orders as documented by SRIKANTH Delgado, after I modified the exam findings and the plan of treatments and the final version is my approved version of the assessment.        Electronically signed by Shahab Garcia MD on 9/20/2021 at 22:09 CDT

## 2021-09-20 NOTE — PROGRESS NOTES
[facility] Otolaryngology Head and Neck Surgery  INPATIENT PROGRESS NOTE    Patient Name: Reggie Treviño  : 1985   MRN: [id number]  Date of Admission: 2021  Today's Date: 21  Length of Stay: 0  574/1    Shahab Garcia MD   Primary Care Physician: Terrell Navas, DO  Surgical Procedures Since Admission:    Subjective   Subjective   Chief Complaint: Trach management  HPI   Accompanied by: No one  Since last examined, Reggie Treviño has done well with trach in position.  He continues with the trach capped at night wearing 2 to 4 L of oxygen.  He says he is sleeping quite well.  He is able to vocalize well.  RT reports that the patient has been capped at night, with good oxygen saturations as long as he is wearing nasal O2.  Patient is seen, chart is reviewed    Review of Systems   No change from prior inquiry  All pertinent negatives and positives are as above. All other systems have been reviewed and are negative unless otherwise stated.   Objective   Objective   Vitals:        Physical Exam  Constitutional:       General: He is sleeping. He is not in acute distress.     Appearance: He is well-developed. He is obese.      Interventions: He is intubated.      Comments: Lying in bed, sleeping, trach in neck, capped, nasal O2   HENT:      Head: Normocephalic and atraumatic.      Comments: Face-adiposis     Right Ear: External ear normal.      Left Ear: External ear normal.      Nose: Nose normal.      Comments: Wearing nasal O2     Mouth/Throat:      Mouth: Mucous membranes are dry. No injury.      Dentition: Normal dentition. No gum lesions.      Tongue: No lesions. Tongue does not deviate from midline.      Comments: Macroglossia-severe  Prolapse-severe  OP exam deferred  Eyes:      General: Lids are normal.      Conjunctiva/sclera: Conjunctivae normal.   Neck:      Trachea: Tracheostomy present.      Comments: Larynx-very low    TRACHEOSTOMY SITE:    Tracheostomy  tube type: Gibson #10 tracheal cannula not in the internal stoma   stoma: Healing well  Capping: Tolerating well    Voice: Good when trach is open  Date placed: 7/30/2021  Date last changed: 9/17/2021    Pulmonary:      Effort: No tachypnea or respiratory distress. He is intubated.      Comments: Trach in neck, nasal O2  Musculoskeletal:      Cervical back: Decreased range of motion.   Neurological:      Mental Status: He is oriented to person, place, and time and easily aroused.      Cranial Nerves: Cranial nerves are intact.   Psychiatric:         Attention and Perception: Attention and perception normal.         Mood and Affect: Mood and affect normal.         Speech: Speech normal.         Behavior: Behavior normal. Behavior is cooperative.         Cognition and Memory: Cognition and memory normal.           Result Review    Result Review:  I have personally reviewed the results from the time of this admission to 9/20/2021 08:37 CDT and agree with these findings:  [x]  Laboratory  []  Microbiology  []  Radiology  []  EKG/Telemetry   []  Cardiology/Vascular   []  Pathology  [x]  Old records  []  Other:  Most notable findings include: Mild anemia, no white count elevation, no major electrolyte abnormalities  Progress Notes by Delmy Antunez APRN (09/18/2021 13:45)   Comprehensive Metabolic Panel (09/20/2021 05:49)   CBC & Differential (09/20/2021 05:49)       Assessment/Plan   Assessment / Plan   Brief Patient Summary:  Reggie Treviño is a 36 y.o. male who has remained with a trach in position.  He has now capping at night with good saturations as long as he wears supplemental O2.  He may no longer require tracheostomy for treatment of sleep apnea.  I will consider decannulation.  I have discussed this with the patient.    Active Hospital Problems:   Active Hospital Problems    Diagnosis    • Acute tracheostomy management (CMS/[facility])    • Acute hypoxemic respiratory failure due to COVID-19 (CMS/[facility])    •  Obesity, morbid (CMS/H[facility])    • TODD (obstructive sleep apnea)      Plan:   • Trach management-the patient continues to do well with a trach in position.  Because he is now maintaining his saturations while he is sleeping, I will consider decannulation.  This will make attaining a sleep study more all the more easy.  I will continue trach care.  • Obstructive sleep apnea-being treated with nasal O2 at this point in time.  He will need a sleep study after discharge.  • Respiratory failure-resolving well.  Care per primary team.  • Covid pneumonia-appears resolving well.  Care per primary team  Discussed Plan with patient, RT, Dr. Garcia  Following patient as in-patient. Further recommendations will be made based on serial examinations.    Medications/Orders for this encounter: No new medications ordered.  No New orders written.    Discharge Planning: Per primary team        DVT prophylaxis:  No DVT prophylaxis order currently exists.     Electronically signed by Davin David Jr, MD, 09/20/21, 8:37 AM CDT.

## 2021-09-21 PROCEDURE — 97110 THERAPEUTIC EXERCISES: CPT

## 2021-09-21 PROCEDURE — 97116 GAIT TRAINING THERAPY: CPT

## 2021-09-21 NOTE — PROGRESS NOTES
Jose Garcia M.D.  SRIKANTH Delgado        Internal Medicine Progress Note    9/21/2021   15:27 CDT    Name:  Reggie Treviño  MRN:    [id number]     Acct:     [id number]   Room:  [address] [place] [address] Day: 0     Admit Date: 9/1/2021  8:21 PM  PCP: Terrell Navsa DO    Subjective:     C/C: weakness, cough    Interval History: Status:  stayed the same. Up to chair. Wife at bedside. Tolerating decker trach.  02 sats stable with 02 at 4 lpm per nasal cannula. States that he desatted with sleep overnight - one time, his nasal cannula was out of his nose. Progressing with therapy. Largely independent for ADLs. Afebrile. Reports that ENT considering decannulation as he is tolerating 02 per nasal cannula with sleep.  Anxious about discharge/discharge plan. Hopeful for discharge to home later this week.      Review of Systems   Constitutional: Positive for malaise/fatigue. Negative for chills, decreased appetite, weight gain and weight loss.   HENT: Negative for congestion, ear discharge, hoarse voice and tinnitus.    Eyes: Negative for blurred vision, discharge, visual disturbance and visual halos.   Cardiovascular: Negative for chest pain, claudication, dyspnea on exertion, irregular heartbeat, leg swelling, orthopnea and paroxysmal nocturnal dyspnea.   Respiratory: Positive for cough. Negative for shortness of breath, sputum production and wheezing.    Endocrine: Negative for cold intolerance, heat intolerance and polyuria.   Hematologic/Lymphatic: Negative for adenopathy. Does not bruise/bleed easily.   Skin: Negative for dry skin, itching and suspicious lesions.   Musculoskeletal: Negative for arthritis, back pain, falls, joint pain, muscle weakness and myalgias.   Gastrointestinal: Negative for abdominal pain, constipation, diarrhea, dysphagia and hematemesis.   Genitourinary: Negative for bladder incontinence, dysuria and frequency.   Neurological: Positive for weakness. Negative for aphonia,  disturbances in coordination and dizziness.   Psychiatric/Behavioral: Negative for altered mental status, depression, memory loss and substance abuse. The patient does not have insomnia and is not nervous/anxious.          Medications:     Allergies:   Allergies   Allergen Reactions   • Sulfa Antibiotics Rash       Current Meds:   Current Facility-Administered Medications:   •  acetaminophen (TYLENOL) tablet 500 mg, 500 mg, Oral, Q8H PRN, Jordyn Gómez APRN  •  albuterol sulfate HFA (PROVENTIL HFA;VENTOLIN HFA;PROAIR HFA) inhaler 6 puff, 6 puff, Inhalation, Q4H PRN, Shahab Garcia MD  •  artificial tears (REFRESH LACRI-LUBE) ophthalmic ointment, , Both Eyes, 4x Daily, Shahab Garcia MD  •  bacitracin ointment, , Topical, Q12H, Shahab Garcia MD  •  dextromethorphan polistirex ER (DELSYM) 30 MG/5ML oral suspension 60 mg, 60 mg, Oral, Q12H PRN, Debi Choi APRN  •  dextrose (D50W) 25 g/ 50mL Intravenous Solution 25 g, 25 g, Intravenous, Q15 Min PRN, Shahab Garcia MD  •  dextrose (GLUTOSE) oral gel 15 g, 15 g, Oral, Q15 Min PRN, Shahab Garcia MD  •  docusate (COLACE) 50 MG/5ML liquid 150 mg, 150 mg, Oral, Daily PRN, Shahab Garcia MD  •  famotidine (PEPCID) tablet 20 mg, 20 mg, Oral, Q12H, Shahab Garcia MD  •  glucagon (human recombinant) (GLUCAGEN DIAGNOSTIC) injection 1 mg, 1 mg, Subcutaneous, Q15 Min PRN, Shahab Garcia MD  •  HYDROcod Polst-CPM Polst ER (TUSSIONEX PENNKINETIC) 10-8 MG/5ML ER suspension 5 mL, 5 mL, Oral, Q12H PRN, Shahab Garcia MD  •  hypromellose (ISOPTO TEARS) 0.5 % ophthalmic solution 2 drop, 2 drop, Both Eyes, Q2H PRN, Shahab Garcia MD  •  ipratropium-albuterol (DUO-NEB) nebulizer solution 3 mL, 3 mL, Nebulization, Q6H PRN, Shahab Garcia MD  •  loperamide (IMODIUM) capsule 2 mg, 2 mg, Oral, 4x Daily PRN, Shahab Garcia MD  •  ondansetron (ZOFRAN) injection 4 mg, 4 mg, Intravenous, Q6H  "PRN **OR** ondansetron (ZOFRAN) tablet 4 mg, 4 mg, Oral, Q6H PRN, Shahab Garcia MD  •  polyethylene glycol (MIRALAX) packet 17 g, 17 g, Oral, Q12H PRN, Shahab Garcia MD  •  saccharomyces boulardii (FLORASTOR) capsule 250 mg, 250 mg, Oral, BID, Shahab Garcia MD  •  sodium chloride 0.9 % flush 10 mL, 10 mL, Intravenous, Q12H, Shahab Garcia MD  •  sodium chloride 0.9 % flush 10 mL, 10 mL, Intravenous, PRN, Shahab Garcia MD  •  traZODone (DESYREL) tablet 25 mg, 25 mg, Oral, Nightly PRN, Shahab Garcia MD    Data:     Code Status:    There are no questions and answers to display.       No family history on file.    Social History     Socioeconomic History   • Marital status:      Spouse name: Not on file   • Number of children: Not on file   • Years of education: Not on file   • Highest education level: Not on file   Tobacco Use   • Smoking status: Unknown If Ever Smoked       Vitals:  Ht 172.7 cm (68\")   Wt 117 kg (259 lb)   BMI 39.38 kg/m²     T 97.6 P 96 R 20 /91 Sp02 98% (4 lpm)        I/O (24Hr):  No intake or output data in the 24 hours ending 09/21/21 1527    Labs and imaging:      No results found for this or any previous visit (from the past 12 hour(s)).        Physical Examination:        Physical Exam  Vitals and nursing note reviewed.   Constitutional:       Appearance: He is well-developed.   HENT:      Head: Normocephalic and atraumatic.      Right Ear: External ear normal.      Left Ear: External ear normal.      Nose: Nose normal.      Mouth/Throat:      Mouth: Mucous membranes are moist.   Eyes:      Pupils: Pupils are equal, round, and reactive to light.   Neck:      Comments: Gibson trach  Cardiovascular:      Rate and Rhythm: Normal rate and regular rhythm.      Heart sounds: Normal heart sounds.   Pulmonary:      Effort: Pulmonary effort is normal.      Breath sounds: Normal breath sounds.   Abdominal:      General: Bowel sounds " are normal.      Palpations: Abdomen is soft.      Comments: obese   Musculoskeletal:         General: Normal range of motion.      Cervical back: Normal range of motion and neck supple.      Comments: Generalized weakness   Skin:     General: Skin is warm and dry.   Neurological:      Mental Status: He is alert and oriented to person, place, and time.      Deep Tendon Reflexes: Reflexes are normal and symmetric.   Psychiatric:         Behavior: Behavior normal.           Assessment:             Acute tracheostomy management (CMS/[facility])    Acute hypoxemic respiratory failure due to COVID-19 (CMS/[facility])    Obesity, morbid (CMS/[facility])    TODD (obstructive sleep apnea)    Past Medical History:   Diagnosis Date   • Kidney stone         Plan:        1. Acute hypoxic respiratory failure secondary to COVID 19  2. Dysphagia - improved  3. Bilateral chemosis  4. Stenotrophomonas pneumonia  5. Morbid obesity  6. PTSD  7. Hyperglycemia    Continue current treatment. Monitor counts. Increase activity. Labs Thursday.  Aggressive therapies as tolerated. Oxygen weaning as tolerated. Glycemic control. Maintain patient safety. Trach management per ENT - possible decannulation prior to discharge.  Discharge planning.       Electronically signed by SRIKANTH Fulton on 9/21/2021 at 15:27 CDT

## 2021-09-22 PROCEDURE — 31502 CHANGE OF WINDPIPE AIRWAY: CPT | Performed by: OTOLARYNGOLOGY

## 2021-09-22 PROCEDURE — 97116 GAIT TRAINING THERAPY: CPT

## 2021-09-22 PROCEDURE — 31615 TRCHEOBRNCHSC EST TRACHS INC: CPT | Performed by: OTOLARYNGOLOGY

## 2021-09-22 PROCEDURE — 97535 SELF CARE MNGMENT TRAINING: CPT

## 2021-09-22 PROCEDURE — 99232 SBSQ HOSP IP/OBS MODERATE 35: CPT | Performed by: OTOLARYNGOLOGY

## 2021-09-22 NOTE — PROGRESS NOTES
[facility] Otolaryngology Head and Neck Surgery  INPATIENT PROGRESS NOTE    Patient Name: Reggie Treviño  : 1985   MRN: [id number]  Date of Admission: 2021  Today's Date: 21  Length of Stay: 0  574/1    Shahab Garcia MD   Primary Care Physician: Terrell Navas,   Surgical Procedures Since Admission:    Subjective   Subjective   Chief Complaint: Trach management  HPI   Accompanied by: RT  Since last examined, Reggie Treviño has remained trach in position. He remains capped and only requires nasal O2 to maintain his saturations. He is swallowing well.  RT reports patient is tolerating capping of his trach with nasal O2. He has not required uncapping.  Patient is seen, chart is reviewed    Review of Systems   No change from prior inquiry  All pertinent negatives and positives are as above. All other systems have been reviewed and are negative unless otherwise stated.   Objective   Objective   Vitals:        Physical Exam  Constitutional:       General: He is not in acute distress.     Appearance: He is well-developed. He is obese.      Interventions: He is intubated.      Comments: Sitting up in chair, trach in position, trach capped, nasal O2   HENT:      Head: Normocephalic and atraumatic.      Comments: Face-adiposis     Right Ear: External ear normal.      Left Ear: External ear normal.      Nose: Nose normal.      Comments: Wearing nasal O2     Mouth/Throat:      Mouth: Mucous membranes are dry. No injury.      Dentition: Normal dentition. No gum lesions.      Tongue: No lesions. Tongue does not deviate from midline.      Comments: Macroglossia-severe  Prolapse-severe  OP exam deferred  Eyes:      General: Lids are normal.      Conjunctiva/sclera: Conjunctivae normal.   Neck:      Trachea: Tracheostomy present.      Comments: Larynx-very low    TRACHEOSTOMY SITE:    Tracheostomy tube type: Gibson #10 tracheal cannula not in the internal stoma   stoma:  Healing well  Capping: Tolerating well    Voice: Good when trach is open  Date placed: 7/30/2021  Date decannulated: 9/22/2021  See procedure note for detail    Pulmonary:      Effort: No tachypnea or respiratory distress. He is intubated.      Comments: Trach in neck, nasal O2  Musculoskeletal:      Cervical back: Decreased range of motion.   Neurological:      Mental Status: He is alert and oriented to person, place, and time.      Cranial Nerves: Cranial nerves are intact.   Psychiatric:         Attention and Perception: Attention and perception normal.         Mood and Affect: Mood and affect normal.         Speech: Speech normal.         Behavior: Behavior normal. Behavior is cooperative.         Cognition and Memory: Cognition and memory normal.           Result Review    Result Review:  I have personally reviewed the results from the time of this admission to 9/22/2021 09:18 CDT and agree with these findings:  []  Laboratory  []  Microbiology  []  Radiology  []  EKG/Telemetry   []  Cardiology/Vascular   []  Pathology  [x]  Old records  []  Other:  Most notable findings include: None pertinent to ENT this morning  Progress Notes by Debi Choi APRN (09/21/2021 13:10)      Assessment/Plan   Assessment / Plan   Brief Patient Summary:  Reggie Treviño is a 36 y.o. male who has remained with trach in position. He has tolerated capping and requires only nasal O2. He is now for decannulation. He will seek sleep study after discharge.    Active Hospital Problems:   Active Hospital Problems    Diagnosis    • Acute tracheostomy management (CMS/[facility])    • Acute hypoxemic respiratory failure due to COVID-19 (CMS/[facility])    • Obesity, morbid (CMS/[facility])    • TODD (obstructive sleep apnea)      Plan:   • Trach management-the patient has tolerated capping. He is maintaining saturations with nasal O2 even while sleeping. I have decannulated his trach today. He will continue nasal O2. I will have him continue stoma  care.  • Respiratory failure-improving, per primary team  • Covid pneumonia-improving, per primary team  Discussed Plan with RT and patient  Following patient as in-patient. Further recommendations will be made based on serial examinations.    Medications/Orders for this encounter: No new medications ordered.  Orders-  Trach stoma care     Discharge Planning: Per primary team        DVT prophylaxis:  No DVT prophylaxis order currently exists.     Electronically signed by Davin David Jr, MD, 09/22/21, 9:18 AM CDT.

## 2021-09-22 NOTE — PROGRESS NOTES
Jose Garcia M.D.  SRIKANTH Delgado        Internal Medicine Progress Note    9/22/2021   10:07 CDT    Name:  Reggie Treviño  MRN:    [id number]     Acct:     [id number]   Room:  [address] [place] [address] Day: 0     Admit Date: 9/1/2021  8:21 PM  PCP: Terrell Navas DO    Subjective:     C/C: weakness, cough    Interval History: Status:  stayed the same. Ambulating in hallway with therapy. No family at bedside. Tolerated trach decannulation this morning.  02 sats stable with 02 at 4 lpm per nasal cannula. Progressing with therapy. Largely independent for ADLs. Afebrile.  Anxious about discharge/discharge plan. Plans for discharge to home in am.      Review of Systems   Constitutional: Positive for malaise/fatigue. Negative for chills, decreased appetite, weight gain and weight loss.   HENT: Negative for congestion, ear discharge, hoarse voice and tinnitus.    Eyes: Negative for blurred vision, discharge, visual disturbance and visual halos.   Cardiovascular: Negative for chest pain, claudication, dyspnea on exertion, irregular heartbeat, leg swelling, orthopnea and paroxysmal nocturnal dyspnea.   Respiratory: Positive for cough. Negative for shortness of breath, sputum production and wheezing.    Endocrine: Negative for cold intolerance, heat intolerance and polyuria.   Hematologic/Lymphatic: Negative for adenopathy. Does not bruise/bleed easily.   Skin: Negative for dry skin, itching and suspicious lesions.   Musculoskeletal: Negative for arthritis, back pain, falls, joint pain, muscle weakness and myalgias.   Gastrointestinal: Negative for abdominal pain, constipation, diarrhea, dysphagia and hematemesis.   Genitourinary: Negative for bladder incontinence, dysuria and frequency.   Neurological: Positive for weakness. Negative for aphonia, disturbances in coordination and dizziness.   Psychiatric/Behavioral: Negative for altered mental status, depression, memory loss and substance abuse. The  patient does not have insomnia and is not nervous/anxious.          Medications:     Allergies:   Allergies   Allergen Reactions   • Sulfa Antibiotics Rash       Current Meds:   Current Facility-Administered Medications:   •  acetaminophen (TYLENOL) tablet 500 mg, 500 mg, Oral, Q8H PRN, Jordyn Gómez APRN  •  albuterol sulfate HFA (PROVENTIL HFA;VENTOLIN HFA;PROAIR HFA) inhaler 6 puff, 6 puff, Inhalation, Q4H PRN, Shahab Garcia MD  •  artificial tears (REFRESH LACRI-LUBE) ophthalmic ointment, , Both Eyes, 4x Daily, Shahab Garcia MD  •  bacitracin ointment, , Topical, Q12H, Shahab Garcia MD  •  dextromethorphan polistirex ER (DELSYM) 30 MG/5ML oral suspension 60 mg, 60 mg, Oral, Q12H PRN, Debi Choi APRN  •  dextrose (D50W) 25 g/ 50mL Intravenous Solution 25 g, 25 g, Intravenous, Q15 Min PRN, Shahab Garcia MD  •  dextrose (GLUTOSE) oral gel 15 g, 15 g, Oral, Q15 Min PRN, Shahab Garcia MD  •  docusate (COLACE) 50 MG/5ML liquid 150 mg, 150 mg, Oral, Daily PRN, Shahab Garcia MD  •  famotidine (PEPCID) tablet 20 mg, 20 mg, Oral, Q12H, Shahab Garcia MD  •  glucagon (human recombinant) (GLUCAGEN DIAGNOSTIC) injection 1 mg, 1 mg, Subcutaneous, Q15 Min PRN, Shahab Garcia MD  •  HYDROcod Polst-CPM Polst ER (TUSSIONEX PENNKINETIC) 10-8 MG/5ML ER suspension 5 mL, 5 mL, Oral, Q12H PRN, Shahab Garcia MD  •  hypromellose (ISOPTO TEARS) 0.5 % ophthalmic solution 2 drop, 2 drop, Both Eyes, Q2H PRN, Shahab Garcia MD  •  ipratropium-albuterol (DUO-NEB) nebulizer solution 3 mL, 3 mL, Nebulization, Q6H PRN, Shahab Garcia MD  •  loperamide (IMODIUM) capsule 2 mg, 2 mg, Oral, 4x Daily PRN, Shahab Garcia MD  •  ondansetron (ZOFRAN) injection 4 mg, 4 mg, Intravenous, Q6H PRN **OR** ondansetron (ZOFRAN) tablet 4 mg, 4 mg, Oral, Q6H PRN, Shahab Garcia MD  •  polyethylene glycol (MIRALAX) packet 17 g, 17 g, Oral, Q12H  "PRN, Shahab Garcia MD  •  saccharomyces boulardii (FLORASTOR) capsule 250 mg, 250 mg, Oral, BID, Shahab Garcia MD  •  sodium chloride 0.9 % flush 10 mL, 10 mL, Intravenous, Q12H, Shahab Garcia MD  •  sodium chloride 0.9 % flush 10 mL, 10 mL, Intravenous, PRN, Shahab Garcia MD  •  traZODone (DESYREL) tablet 25 mg, 25 mg, Oral, Nightly PRN, Shahab Garcia MD    Data:     Code Status:    There are no questions and answers to display.       No family history on file.    Social History     Socioeconomic History   • Marital status:      Spouse name: Not on file   • Number of children: Not on file   • Years of education: Not on file   • Highest education level: Not on file   Tobacco Use   • Smoking status: Unknown If Ever Smoked       Vitals:  Ht 172.7 cm (68\")   Wt 117 kg (259 lb)   BMI 39.38 kg/m²     T 98.1 P 82 R 22 /65 Sp02 100% (4 lpm)        I/O (24Hr):  No intake or output data in the 24 hours ending 09/22/21 1007    Labs and imaging:      No results found for this or any previous visit (from the past 12 hour(s)).        Physical Examination:        Physical Exam  Vitals and nursing note reviewed.   Constitutional:       Appearance: He is well-developed.   HENT:      Head: Normocephalic and atraumatic.      Right Ear: External ear normal.      Left Ear: External ear normal.      Nose: Nose normal.      Mouth/Throat:      Mouth: Mucous membranes are moist.   Eyes:      Pupils: Pupils are equal, round, and reactive to light.   Neck:      Comments: Dressing c/d/i  Cardiovascular:      Rate and Rhythm: Normal rate and regular rhythm.      Heart sounds: Normal heart sounds.   Pulmonary:      Effort: Pulmonary effort is normal.      Breath sounds: Normal breath sounds.   Abdominal:      General: Bowel sounds are normal.      Palpations: Abdomen is soft.      Comments: obese   Musculoskeletal:         General: Normal range of motion.      Cervical back: Normal " range of motion and neck supple.      Comments: Generalized weakness   Skin:     General: Skin is warm and dry.   Neurological:      Mental Status: He is alert and oriented to person, place, and time.      Deep Tendon Reflexes: Reflexes are normal and symmetric.   Psychiatric:         Behavior: Behavior normal.           Assessment:             Acute tracheostomy management (CMS/[facility])    Acute hypoxemic respiratory failure due to COVID-19 (CMS/[facility])    Obesity, morbid (CMS/[facility])    TODD (obstructive sleep apnea)    Past Medical History:   Diagnosis Date   • Kidney stone         Plan:        1. Acute hypoxic respiratory failure secondary to COVID 19  2. Dysphagia - improved  3. Bilateral chemosis  4. Stenotrophomonas pneumonia  5. Morbid obesity  6. PTSD  7. Hyperglycemia    Continue current treatment. Monitor counts. Increase activity. Labs in am.  Aggressive therapies as tolerated. Oxygen weaning as tolerated. Glycemic control. Maintain patient safety. Monitor closely s/p decannulation.  Discharge planning for discharge to home in am.       Electronically signed by SRIKANTH Fulton on 9/22/2021 at 10:07 CDT   I have discussed the care of Reggie Treviño, including pertinent history and exam findings, with the nurse practitioner.    I have seen and examined the patient and the key elements of all parts of the encounter have been performed by me.  I agree with the assessment, plan and orders as documented by SRIKANTH Delgado, after I modified the exam findings and the plan of treatments and the final version is my approved version of the assessment.        Electronically signed by Shahab Garcia MD on 9/22/2021 at 11:44 CDT

## 2021-09-22 NOTE — PROCEDURES
[facility] Otolaryngology Head and Neck Surgery  PROCEDURE NOTE    Anesthesia: none    Indications for Procedure:     Acute tracheostomy management (CMS/HCC)    Acute hypoxemic respiratory failure due to COVID-19 (CMS/HCC)    Obesity, morbid (CMS/HCC)    TODD (obstructive sleep apnea)     Endoscopy Type: Flexible Tracheobronchoscopy    Indications for Procedure:   Tracheostomy evaluation  Trachea evaluation    Procedure Details:    The patient was placed in the sitting position.  The SCOPE TYPE: Flexible laryngoscope was passed both via the tracheostome and the tracheostomy tube .  The trachea from the subglottis to the terese was examined.  A retrograde examination of the Vocal cords and subglottis was carried out during respiration and phonation.  The following findings were noted:    Findings:   TRACHEO-BRONCHOSCOPY:    Stoma: Long tract, healing appropriately, mild necrotic debris   Distal Trachea: Intact, no secretions, terese sharp, MSB patent   Proximal trachea: Intact mucosa, no secretions, scarring   True Vocal cords: Mobile from below, no penetration   Subglottis: Intact, no scarring     Condition:  Stable.  Patient tolerated procedure well.    Complications:  None    Procedure:  Tracheostomy tube change    Procedure Details:    The patient's respiratory status was assessed.  The trach tube in position was assessed.  The patient was positioned.  The trach tube was removed without difficulty. The stoma and trachea were inspected.    No trach tube was placed and the patient completely decannulated.    Findings: See above    Condition:  Stable.  Patient tolerated procedure well.    Complications:  None    Post-procedure instructions reviewed with Patient, RT     Electronically signed by Davin David Jr, MD, 09/22/21, 9:19 AM CDT.

## 2021-09-22 NOTE — PROGRESS NOTES
"LTACH Fall Assessment Note    Reggie Treviño is a 36 y.o.male  [Ht: 172.7 cm (68\"); Wt: 117 kg (259 lb)] admitted 9/1/2021  8:21 PM.    Current medications associated with an increased risk for fall include:  Tussionex  Loperamide  Ondansetron  Trazodone    ALEJA Lynne, [facility]  09/22/2114:23 CDT         "

## 2021-09-23 LAB
ANION GAP SERPL CALCULATED.3IONS-SCNC: 8 MMOL/L (ref 5–15)
BASOPHILS # BLD AUTO: 0.06 10*3/MM3 (ref 0–0.2)
BASOPHILS NFR BLD AUTO: 0.9 % (ref 0–1.5)
BUN SERPL-MCNC: 8 MG/DL (ref 6–20)
BUN/CREAT SERPL: 16.3 (ref 7–25)
CALCIUM SPEC-SCNC: 9.5 MG/DL (ref 8.6–10.5)
CHLORIDE SERPL-SCNC: 101 MMOL/L (ref 98–107)
CO2 SERPL-SCNC: 31 MMOL/L (ref 22–29)
CREAT SERPL-MCNC: 0.49 MG/DL (ref 0.76–1.27)
DEPRECATED RDW RBC AUTO: 47.7 FL (ref 37–54)
EOSINOPHIL # BLD AUTO: 0.31 10*3/MM3 (ref 0–0.4)
EOSINOPHIL NFR BLD AUTO: 4.7 % (ref 0.3–6.2)
ERYTHROCYTE [DISTWIDTH] IN BLOOD BY AUTOMATED COUNT: 14.6 % (ref 12.3–15.4)
GFR SERPL CREATININE-BSD FRML MDRD: >150 ML/MIN/1.73
GLUCOSE SERPL-MCNC: 96 MG/DL (ref 65–99)
HCT VFR BLD AUTO: 35.2 % (ref 37.5–51)
HGB BLD-MCNC: 11.1 G/DL (ref 13–17.7)
IMM GRANULOCYTES # BLD AUTO: 0.04 10*3/MM3 (ref 0–0.05)
IMM GRANULOCYTES NFR BLD AUTO: 0.6 % (ref 0–0.5)
LYMPHOCYTES # BLD AUTO: 1.96 10*3/MM3 (ref 0.7–3.1)
LYMPHOCYTES NFR BLD AUTO: 29.6 % (ref 19.6–45.3)
MCH RBC QN AUTO: 28.2 PG (ref 26.6–33)
MCHC RBC AUTO-ENTMCNC: 31.5 G/DL (ref 31.5–35.7)
MCV RBC AUTO: 89.3 FL (ref 79–97)
MONOCYTES # BLD AUTO: 0.63 10*3/MM3 (ref 0.1–0.9)
MONOCYTES NFR BLD AUTO: 9.5 % (ref 5–12)
NEUTROPHILS NFR BLD AUTO: 3.63 10*3/MM3 (ref 1.7–7)
NEUTROPHILS NFR BLD AUTO: 54.7 % (ref 42.7–76)
NRBC BLD AUTO-RTO: 0 /100 WBC (ref 0–0.2)
PLATELET # BLD AUTO: 265 10*3/MM3 (ref 140–450)
PMV BLD AUTO: 9.1 FL (ref 6–12)
POTASSIUM SERPL-SCNC: 4 MMOL/L (ref 3.5–5.2)
RBC # BLD AUTO: 3.94 10*6/MM3 (ref 4.14–5.8)
SODIUM SERPL-SCNC: 140 MMOL/L (ref 136–145)
WBC # BLD AUTO: 6.63 10*3/MM3 (ref 3.4–10.8)

## 2021-09-23 PROCEDURE — 85025 COMPLETE CBC W/AUTO DIFF WBC: CPT | Performed by: INTERNAL MEDICINE

## 2021-09-23 PROCEDURE — 80048 BASIC METABOLIC PNL TOTAL CA: CPT | Performed by: INTERNAL MEDICINE

## 2021-09-23 PROCEDURE — 99232 SBSQ HOSP IP/OBS MODERATE 35: CPT | Performed by: OTOLARYNGOLOGY

## 2021-09-23 NOTE — PROGRESS NOTES
[facility] Otolaryngology Head and Neck Surgery  INPATIENT PROGRESS NOTE    Patient Name: Reggie Treviño  : 1985   MRN: [id number]  Date of Admission: 2021  Today's Date: 21  Length of Stay: 0  574/1    Shahab Garcia MD   Primary Care Physician: Terrell Navas,   Surgical Procedures Since Admission:    Subjective   Subjective   Chief Complaint: Trach management  HPI   Accompanied by: No one  Since last examined, Reggie Treviño has successfully been weaned from his tracheostomy tube and decannulated.  Patient denies any complaints this morning.  He feels like he is breathing well.  He has slept well.  He is swallowing normally.  RT reports no issues with the trach site.  Patient is seen, chart is reviewed    Review of Systems   No change from prior inquiry  All pertinent negatives and positives are as above. All other systems have been reviewed and are negative unless otherwise stated.   Objective   Objective   Vitals:        Physical Exam  Constitutional:       General: He is not in acute distress.     Appearance: He is well-developed. He is obese.      Interventions: He is intubated.      Comments: Lying in bed, nasal cannula in position   HENT:      Head: Normocephalic and atraumatic.      Comments: Face-adiposis     Right Ear: External ear normal.      Left Ear: External ear normal.      Nose: Nose normal.      Comments: Wearing nasal O2     Mouth/Throat:      Mouth: Mucous membranes are dry. No injury.      Dentition: Normal dentition. No gum lesions.      Tongue: No lesions. Tongue does not deviate from midline.      Comments: Macroglossia-severe  Prolapse-severe  OP exam deferred  Eyes:      General: Lids are normal.      Conjunctiva/sclera: Conjunctivae normal.   Neck:      Trachea: Tracheostomy present.      Comments: Larynx-very low    TRACHEOSTOMY SITE:    Tracheostomy tube type: No tube in position stoma: Healing well    Voice: Good   Date placed:  7/30/2021  Date decannulated: 9/22/2021    Pulmonary:      Effort: No tachypnea or respiratory distress. He is intubated.      Comments: Trach in neck, nasal O2  Musculoskeletal:      Cervical back: Decreased range of motion.   Neurological:      Mental Status: He is alert and oriented to person, place, and time.      Cranial Nerves: Cranial nerves are intact.   Psychiatric:         Attention and Perception: Attention and perception normal.         Mood and Affect: Mood and affect normal.         Speech: Speech normal.         Behavior: Behavior normal. Behavior is cooperative.         Cognition and Memory: Cognition and memory normal.           Result Review    Result Review:  I have personally reviewed the results from the time of this admission to 9/23/2021 08:02 CDT and agree with these findings:  []  Laboratory  []  Microbiology  []  Radiology  []  EKG/Telemetry   []  Cardiology/Vascular   []  Pathology  []  Old records  []  Other:  Most notable findings include: None pertinent to ENT evaluation      Assessment/Plan   Assessment / Plan   Brief Patient Summary:  Reggie Treviño is a 36 y.o. male who has successfully been decannulated.  He has no symptoms.  Stoma is healing well.    Active Hospital Problems:   Active Hospital Problems    Diagnosis    • Acute tracheostomy management (CMS/[facility])    • Acute hypoxemic respiratory failure due to COVID-19 (CMS/[facility])    • Obesity, morbid (CMS/[facility])    • TODD (obstructive sleep apnea)      Plan:   • Trach management-patient is successfully decannulated.  He will continue stoma care until completely healed  • Respiratory failure-markedly improved.  Per primary team  • Covid pneumonia-markedly improved.  Per primary team  Discussed Plan with patient and RT  I will follow peripherally. Please call if further consultation needed.    Discharge Planning: Per primary team, patient in satisfactory condition for discharge from ENT standpoint        DVT prophylaxis:  No DVT prophylaxis  order currently exists.     Electronically signed by Davin David Jr, MD, 09/23/21, 8:02 AM CDT.

## 2021-09-23 NOTE — DISCHARGE SUMMARY
BERNARDINO Erazo APRN      Internal Medicine Discharge Summary    Patient ID: Reggie Treviño  MRN: [id number]     Acct:  [id number]       Patient's PCP: Terrell Navas DO    Admit Date: 9/1/2021     Discharge Date:    9/23/21    Admitting Physician: Shahab Garcia MD    Discharge Physician: SRIKANTH Fulton     Active Discharge Diagnoses:  Acute hypoxic respiratory failure secondary to COVID 19  Dysphagia - improved  Bilateral chemosis  Stenotrophomonas pneumonia  Morbid obesity  PTSD  Hyperglycemia  Hospital Problems    Acute tracheostomy management (CMS/[facility])    Acute hypoxemic respiratory failure due to COVID-19 (CMS/[facility])    Obesity, morbid (CMS/[facility])    TODD (obstructive sleep apnea)     Past Medical History:   Diagnosis Date    Kidney stone        The patient was seen and examined on the day of discharge and this discharge summary is in conjunction with any daily progress note from day of discharge.    Code Status:    There are no questions and answers to display.       Hospital Course: Reggie Treviño is a  36 y.o.  male who presents with need for continue oxygen weaning and rehabilitation efforts following recent lengthy acute care stay. The patient had been in his usual state of health when he was diagnosed with COVID-19 on 7/19. Initially, he attempted outpatient treatment, but had a rapid decline with increased shortness of breath and presented to [facility] on 7/21. He required urgent intubation and mechanical ventilation and was treated with IV steroids, azithromycin and IV remdesivir. He had a continued decline and was transferred to [facility] for higher level of care. He was treated with tocilizumab upon transfer. Despite maximum ventilator support, he had continued hypoxia as well as decline in renal function. He transferred to [facility] on 7/25 for ECMO. The patient was treated with ECMO and a second ECMO circuit was added 8/11. He was  "decannulated on 8/25 and post decannulation dopplers showed no evidence of DVT. RIJ cannula site suture may be discontinued on 9/7. The patient was unable to liberate from the ventilator and underwent tracheostomy tube placement on 7/30. He was weaned to trach collar, is tolerating PMV and has been evaluated and started on a po diet which he is tolerating without difficulty. He required heavy sedation for quite some time as we as a long opioid and benzodiazepine taper. The patient appeared to be \"crying blood\" and was evaluated by ophthalmology on 8/16. The patient was noted to have chemosis of bilateral conjunctivae. He was treated conservatively, but has had significant improvement in the appearance of the hemorrhages as well as visual acuity. Sputum culture obtained 7/27 returne dpositive for MSSA and stenotrophomonas. Patient was treated with bactrim. The patient had continued temperature spikes associated with a rash despite antibiotic therapy. Bactrim was stopped and listed as an allergy after dermatology confirmed DRESS with biopsy. Blood and sputum cultures obtained 8/6 as part of a work up for fever returned positive for e. Coli and patient completed a course of IV levaquin.  Blood cultures were obtained 8/26 as part of workup for fever and returned positive for stenotrophomonas. PICC line was discontinued and repeat/surveillance cultures returned negative. Ceftazidime was initiated with recommendations to complete a 14 day course with stop date 9/9. Sputum cultures also positive for stenotrophomonas at that time. MRI was obtained and neurosurgery consulted for small IVH and possible frontal SAH. It appeared there was no acute bleed and conservative management recommended. The patient transferred to our facility for continued oxygen weaning and rehabilitation efforts.   While here, he progressed with therapy to the point that he is largely independent for all ADLs. He maintained adequate 02 sats with trach " "collar and when trach was capped, he was able to maintain adequate 02 sats with nasal cannula. He did have issues with desaturations with sleep which he attributed to being a \"mouth breather\". Suspect some underlying, previously undiagnosed sleep apnea and have recommended outpatient workup/evaluation. The patient underwent flexible bedside laryngoscopy with trach downsize on 9/10 which he tolerated without difficulty. There was some discussion of the patient possibly discharging to home with trach. The patient underwent flexible bedside tracheobronchoscopy on 9/15 and was transitioned to a Gibson tracheostomy. He tolerated without difficulty and was able to maintain adequate 02 sats. The patient was decannulated on 9/22. He is now felt stable for discharge to home with the assistance of family and close outpatient follow up.     Consults:   Dr. David (ENT)  Dr. Yee (pulmonology)    Disposition: home     Physical Exam  Vitals and nursing note reviewed.   Constitutional:       Appearance: He is well-developed.   HENT:      Head: Normocephalic and atraumatic.      Right Ear: External ear normal.      Left Ear: External ear normal.      Nose: Nose normal.      Mouth/Throat:      Mouth: Mucous membranes are moist.   Eyes:      Pupils: Pupils are equal, round, and reactive to light.   Neck:      Comments: Dressing c/d/i  Cardiovascular:      Rate and Rhythm: Normal rate and regular rhythm.      Heart sounds: Normal heart sounds.   Pulmonary:      Effort: Pulmonary effort is normal.      Breath sounds: Normal breath sounds.   Abdominal:      General: Bowel sounds are normal.      Palpations: Abdomen is soft.      Comments: obese   Musculoskeletal:         General: Normal range of motion.      Cervical back: Normal range of motion and neck supple.      Comments: Generalized weakness   Skin:     General: Skin is warm and dry.   Neurological:      Mental Status: He is alert and oriented to person, place, and " time.      Deep Tendon Reflexes: Reflexes are normal and symmetric.   Psychiatric:         Behavior: Behavior normal.      Discharged Condition: Stable    Follow Up: PCP 1 week    Diet: Diet Regular; Thin    Discharge Medications:   See computer generated medication reconciliation form    Time Spent on discharge is  32 minutes in patient examination, evaluation, patient/family counseling as well as medication reconciliation, prescriptions for required medications, discharge plan and follow up.     Electronically signed by SRIKANTH Fulton on 9/23/2021 at 12:32 CDT     I have discussed the care of Reggie Treviño, including pertinent history and exam findings, with the nurse practitioner.    I have seen and examined the patient and the key elements of all parts of the encounter have been performed by me.  I agree with the assessment, plan and orders as documented by SRIKANTH Delgado, after I modified the exam findings and the plan of treatments and the final version is my approved version of the assessment.        Electronically signed by Shahab Garcia MD on 9/23/2021 at 22:42 CDT

## 2021-09-29 ENCOUNTER — HOSPITAL ENCOUNTER (OUTPATIENT)
Dept: ULTRASOUND IMAGING | Facility: HOSPITAL | Age: 36
Discharge: HOME | End: 2021-09-29
Admitting: INTERNAL MEDICINE

## 2021-09-29 ENCOUNTER — TRANSCRIBE ORDERS (OUTPATIENT)
Dept: ADMINISTRATIVE | Facility: HOSPITAL | Age: 36
End: 2021-09-29

## 2021-09-29 DIAGNOSIS — M79.651 RIGHT THIGH PAIN: ICD-10-CM

## 2021-09-29 DIAGNOSIS — M79.651 RIGHT THIGH PAIN: Primary | ICD-10-CM

## 2021-09-29 PROCEDURE — 93971 EXTREMITY STUDY: CPT

## 2021-10-01 ENCOUNTER — TELEPHONE (OUTPATIENT)
Dept: NEUROLOGY | Age: 36
End: 2021-10-01

## 2021-10-01 NOTE — TELEPHONE ENCOUNTER
Called patient about appointment with Dr Kenya Alfaro, could not reach patient by phone, left message on patient voice mail about the appointment Location and phone number .

## 2021-11-22 ENCOUNTER — OFFICE VISIT (OUTPATIENT)
Dept: NEUROLOGY | Age: 36
End: 2021-11-22
Payer: COMMERCIAL

## 2021-11-22 VITALS
WEIGHT: 272 LBS | DIASTOLIC BLOOD PRESSURE: 88 MMHG | SYSTOLIC BLOOD PRESSURE: 138 MMHG | BODY MASS INDEX: 41.22 KG/M2 | HEIGHT: 68 IN | HEART RATE: 76 BPM | RESPIRATION RATE: 18 BRPM

## 2021-11-22 DIAGNOSIS — I61.5 INTRAVENTRICULAR HEMORRHAGE (HCC): ICD-10-CM

## 2021-11-22 DIAGNOSIS — G47.33 SLEEP APNEA, OBSTRUCTIVE: ICD-10-CM

## 2021-11-22 DIAGNOSIS — I60.9 SUBARACHNOID HEMORRHAGE (HCC): Primary | ICD-10-CM

## 2021-11-22 DIAGNOSIS — R09.02 HYPOXEMIA: ICD-10-CM

## 2021-11-22 PROCEDURE — 99203 OFFICE O/P NEW LOW 30 MIN: CPT | Performed by: PSYCHIATRY & NEUROLOGY

## 2021-11-22 RX ORDER — M-VIT,TX,IRON,MINS/CALC/FOLIC 27MG-0.4MG
1 TABLET ORAL DAILY
COMMUNITY

## 2021-11-22 RX ORDER — GABAPENTIN 100 MG/1
CAPSULE ORAL
COMMUNITY
Start: 2021-10-14

## 2021-11-22 RX ORDER — ERGOCALCIFEROL (VITAMIN D2) 1250 MCG
CAPSULE ORAL
COMMUNITY
Start: 2021-10-14

## 2021-11-22 RX ORDER — ALBUTEROL SULFATE 90 UG/1
6 AEROSOL, METERED RESPIRATORY (INHALATION) EVERY 4 HOURS PRN
COMMUNITY
Start: 2021-09-01 | End: 2022-09-02

## 2021-11-22 NOTE — PROGRESS NOTES
[facility] Neurology  [address] [place] [address] Drive, [address]  [place], [place] [address]  Phone [phone]  Fax [phone]     [facility] NEW PATIENT VISIT        Patient: Trip York  :  1985  Age:  31 y.o. MRN:  [id number]  Account #:  [de-identified]:  21    Referring Provider: Sanchez Lyman DO   [place] [address]  Consulting Provider: Cj Montes M.D.    [address] [place] [address] Avenue:  Chief Complaint   Patient presents with    New Patient       History Source: History obtained from the patient. PCP: Sanchez Lyman DO    HISTORY OF PRESENTILLNESS:   Trip York is a 31y.o. year old man with a history of prolonged and aggressive COVID-19 including need for ECMO during which he was found to have an intracranial hemorrhage who was referred for evaluation. He is obese and for years has been noted to have snoring, witnessed apneas during sleep, and daytime drowsiness. He has never had a sleep study and has never been diagnosed formally with obstructive sleep apnea. In mid July, he developed fever, dyspnea, cough. He had worsening shortness of air and presented to the ER in [place] on  where he was diagnosed with COVID pneumonia and hypoxemia. He had to be intubated and was transferred here. Even on maximum ventilatory support he continued to have hypoxemia and was transferred to [facility]  where he required ECMO. His course was complicated by acute kidney injury, secondary bacterial E. Coli pneumonia and sepsis. In addition to ventilation and antibiotics, specific treatment included Actemra, Remdesiver, dexamethasone, ECMO including a second circuit. He of course was anticoagulated. He developed conjuctival hemorrhages in both eyes. He had delerium and agitation and had an MRI head on  that showed a bifrontal SAH and layering bilateral temporal intraventricular hemorrhage. He did have a CTA head on  that did not show any aneurysm. He was discharged to [facility] on . and tinnitus. Eyes: Negative for photophobia and visual disturbance. Respiratory: Positive for shortness of breath. Negative for cough. Cardiovascular: Negative for chest pain and palpitations. Gastrointestinal: Negative for nausea and vomiting. Endocrine: Negative for polydipsia and polyuria. Genitourinary: Negative for frequency and urgency. Musculoskeletal: Positive for arthralgias. Negative for back pain. Skin: Negative for rash and wound. Allergic/Immunologic: Negative for environmental allergies and food allergies. Neurological: Negative for dizziness, tremors, seizures, syncope, facial asymmetry, speech difficulty, weakness, light-headedness, numbness and headaches. Hematological: Negative for adenopathy. Does not bruise/bleed easily. Psychiatric/Behavioral: Negative for dysphoric mood. The patient is not nervous/anxious. PHYSICAL EXAMINATION:  Vitals:  /88   Pulse 76   Resp 18   Ht 5' 7.6\" (1.717 m)   Wt 272 lb (123.4 kg)   BMI 41.85 kg/m²   General appearance:  Alert, well developed, well nourished, in no distress  HEENT:  normocephalic, atraumatic, sclera appear normal, no nasal abnormalities, no rhinorrhea, Ears appear normal, oral mucous membranes are moist without erythema, trachea midline, thyroid is normal, no lymphadenopathy or neck mass. Cardiovascular:  Regular rate and rhythm without murmer. No peripheral edema, No cyanosis or clubbing. No carotid bruits. Pulmonary:  Lungs are clear to auscultation. Breathing appears normal, good expansion, normal effort without use of accessory muscles  Musculoskeletal:  Joints are normal.  No splints, slings, or casts. Spine appears normal with normal posture and range of motion. Integument:  No rash, erythema, or pallor  Psychiatric:  Mood, affect, and behavior appear normal      NEUROLOGIC EXAMINATION:  Neurologic Exam     Mental Status   Oriented to person, place, and time.    Speech: speech is normal   Level of consciousness: alert  Speech is fluent. Cranial Nerves     CN II   Visual fields full to confrontation. CN III, IV, VI   Pupils are equal, round, and reactive to light. Extraocular motions are normal.     CN VII   Facial expression full, symmetric.      CN VIII   Hearing: intact    CN IX, X   Palate: symmetric    CN XI   Right sternocleidomastoid strength: normal  Left sternocleidomastoid strength: normal  Right trapezius strength: normal  Left trapezius strength: normal    CN XII   Tongue: not atrophic  Fasciculations: absent  Tongue deviation: none    Motor Exam   Muscle bulk: normal  Overall muscle tone: normal  Right arm pronator drift: absent  Left arm pronator drift: absent    Strength   Right neck flexion: 5/5  Left neck flexion: 5/5  Right neck extension: 5/5  Left neck extension: 5/5  Right deltoid: 5/5  Left deltoid: 5/5  Right biceps: 5/5  Left biceps: 5/5  Right triceps: 5/5  Left triceps: 5/5  Right wrist flexion: 5/5  Left wrist flexion: 5/5  Right wrist extension: 5/5  Left wrist extension: 5/5  Right interossei: 5/5  Left interossei: 5/5  Right iliopsoas: 5/5  Left iliopsoas: 5/5  Right quadriceps: 5/5  Left quadriceps: 5/5  Right glutei: 5/5  Left glutei: 5/5  Right anterior tibial: 5/5  Left anterior tibial: 5/5  Right posterior tibial: 5/5  Left posterior tibial: 5/5  Right peroneal: 5/5  Left peroneal: 5/5  Right gastroc: 5/5  Left gastroc: 5/5    Sensory Exam   Light touch normal.   Vibration normal.     Gait, Coordination, and Reflexes     Gait  Gait: normal    Coordination   Finger to nose coordination: normal    Tremor   Resting tremor: absent  Intention tremor: absent  Action tremor: absent    Reflexes   Right brachioradialis: 1+  Left brachioradialis: 1+  Right biceps: 1+  Left biceps: 1+  Right triceps: 1+  Left triceps: 1+  Right patellar: 1+  Left patellar: 1+  Right achilles: 0  Left achilles: 0  Right plantar: normal  Left plantar: normal  Right Miller: absent  Left Miller: absent  Right ankle clonus: absent  Left ankle clonus: absent  Rapid alternating movements were normal.       ADDITIONAL REVIEW:  MRI brain without contrast    Anatomical Region Laterality Modality   Head and Neck -- Magnetic Resonance       Impression  Performed by [facility]   1.  Small-volume layering intraventricular hemorrhage. Mildly prominent ventricles with rounding of the temporal horn concerning for early hydrocephalus. 2.  Questionable frontal convexity subarachnoid hemorrhage versus small thrombosed pial vessels. Consider CT and CTA to further evaluate if clinically indicated. 3.  Moderate mastoid effusions. Electronically signed by Amadeo Leiva on 8/29/2021 11:04 AM     Alice SANCHEZ MD, have reviewed the images and verify the above interpretation on 8/29/2021 11:18 AM.     Electronically signed by Alice Luna MD on 8/29/2021 11:18 AM    Narrative  Performed by Tania Ibanez      MRI BRAIN WITHOUT CONTRAST. HISTORY: Headache, chronic, new features or increased frequency; disc edema per ophtho     COMPARISON: None     TECHNIQUE: Multiplanar, multisequence MR imaging was performed of the brain without contrast.     FINDINGS:   No restricted diffusion. Small volume hemorrhage layers within the bilateral occipital horns. Questionable small volume layering subarachnoid hemorrhage over the frontal convexities. The ventricles are mildly prominent for the patient's age and there is   early rounding of the temporal horns. No midline shift, parenchymal hemorrhage, or abnormal extra-axial fluid collection. The major T2 intracranial arterial flow voids appear grossly normal. Normal sella. No widening of Meckel's cave. Left sphenoid sinus opacification. Moderate bilateral mastoid effusions. No obvious fluid along the optic nerve sheaths.       CT angiogram head with and without and neck with contrast    Anatomical Region Laterality Modality   Head and Neck -- Computed Tomography       Impression  Performed by [facility]     1. Small amount of blood seen in the occipital horns bilaterally, as on the MR from earlier in the day. Stable ventricular size. Otherwise unremarkable head CT. 2. Normal CTA of head and neck. Electronically signed by Chloe Dugan MD on 8/30/2021 8:30 AM    Narrative  Performed by Timothy Yeager       CT ANGIOGRAM HEAD W WO AND NECK W CONTRAST     HISTORY: Stroke, hemorrhagic     COMPARISON: MRI brain 8/29/2021. TECHNIQUE: Axial images were obtained through the head without IV contrast. For the CT angiogram of the head and neck, spiral images are obtained from the top of the aortic arch in the neck to the vertex of the head during IV administration of contrast.   Multiple MIP reformatted images of the head and neck were produced. INTRAVENOUS CONTRAST ADMINISTRATION (mL Omnipaque 350): 60     DOSE REPORT (Total DLP mGy*cm): CTDIvol:.6 - 49.8 mGy; Total DLP:1371 mGy-cm. FINDINGS:     CT HEAD:     Compared with the MR examination earlier in the day of the ventricular size has not changed. Small amounts of hemorrhage are seen layering in the posterior horns as on the prior MRI, as small areas of hyperattenuation in the occipital horns. No other   areas of intracranial hemorrhage are noted within the ventricles, or the brain parenchyma. There is evidence of extra-axial hemorrhage. CTA NECK:     There are separate patent arch origins of the brachiocephalic vessels, and widely patent vertebral origins bilaterally. There is no evidence of a atherosclerotic disease in either carotid or either vertebral circulation in the neck. CTA HEAD:     The anterior and the posterior intracranial arterial circulations are unremarkable. IMPRESSION:    ICD-10-CM    1. Subarachnoid hemorrhage (HCC)  I60.9 CT HEAD WO CONTRAST   2. Intraventricular hemorrhage (HCC)  I61.5 CT HEAD WO CONTRAST   3.  Sleep apnea, obstructive  G47.33 DME Order for BiPAP as OP Ambulatory referral to Neurology     Ambulatory referral to Sleep Medicine   4. Hypoxemia  R09.02    He has made a remarkable recovery even though he is still on oxygen. The intracranial hemorrhage was no doubt due to the anticoagulation. He had significant sleep apnea prior to his illness but may impede recovery. PLAN:  1. CT head without contrast  2. Will refer for a split night PSG. He will need a BiPAP with supplemental oxygen likely  3. As it may be a month or more until we can get the PSG done, will get him an auto BiPAP to use for now if able  4.  FU per protocol    Yoav Stringer M.D.

## 2021-11-23 ENCOUNTER — TELEPHONE (OUTPATIENT)
Dept: NEUROLOGY | Age: 36
End: 2021-11-23

## 2021-11-23 NOTE — TELEPHONE ENCOUNTER
Left a voicemail with the CT appointment time, date, and location with a call back number for any questions.

## 2021-11-29 ENCOUNTER — HOSPITAL ENCOUNTER (OUTPATIENT)
Dept: CT IMAGING | Age: 36
Discharge: HOME | End: 2021-11-29
Payer: COMMERCIAL

## 2021-11-29 DIAGNOSIS — I61.5 INTRAVENTRICULAR HEMORRHAGE (HCC): ICD-10-CM

## 2021-11-29 DIAGNOSIS — I60.9 SUBARACHNOID HEMORRHAGE (HCC): ICD-10-CM

## 2021-11-29 PROCEDURE — 70450 CT HEAD/BRAIN W/O DYE: CPT

## 2021-12-01 ENCOUNTER — TELEPHONE (OUTPATIENT)
Dept: NEUROLOGY | Age: 36
End: 2021-12-01

## 2021-12-01 ASSESSMENT — ENCOUNTER SYMPTOMS
VOMITING: 0
SHORTNESS OF BREATH: 1
COUGH: 0
PHOTOPHOBIA: 0
BACK PAIN: 0
NAUSEA: 0

## 2021-12-01 NOTE — TELEPHONE ENCOUNTER
----- Message from Frederick Myles MD sent at 11/30/2021  1:30 AM CST -----  CT head showed resolution of the intracranial hemorrhage.   It was normal.

## 2022-01-17 ENCOUNTER — TRANSCRIBE ORDERS (OUTPATIENT)
Dept: ADMINISTRATIVE | Facility: HOSPITAL | Age: 37
End: 2022-01-17

## 2022-01-17 DIAGNOSIS — J96.11 CHRONIC RESPIRATORY FAILURE WITH HYPOXIA: ICD-10-CM

## 2022-01-17 DIAGNOSIS — Z86.16 PERSONAL HISTORY OF COVID-19: Primary | ICD-10-CM

## 2022-02-21 ENCOUNTER — HOSPITAL ENCOUNTER (OUTPATIENT)
Dept: SLEEP CENTER | Age: 37
Discharge: HOME | End: 2022-02-23
Payer: COMMERCIAL

## 2022-02-21 PROCEDURE — 95811 POLYSOM 6/>YRS CPAP 4/> PARM: CPT

## 2022-02-22 NOTE — PROGRESS NOTES
[place] [address]  [place], [address]  Phone [phone] Fax [phone]     Sleep Study Technician Review    Patient Name:  Ronnie Hamilton  :   1985  Referring Provider: Laney Rubin MD    Brief History:  Ronnie Hamilton is a 31 y.o. male with a history of Obesity, Asthma and Stroke who has been referred for a PSG/split. He is obese and for years has been noted to have snoring, witnessed apneas during sleep, and daytime drowsiness. He has never had a sleep study and has never been diagnosed formally with obstructive sleep apnea. In mid July, he developed fever, dyspnea, cough. He had worsening shortness of air and presented to the ER in [place] on  where he was diagnosed with COVID pneumonia and hypoxemia. He had to be intubated and was transferred here. Even on maximum ventilatory support he continued to have hypoxemia and was transferred to [facility]  where he required ECMO. His course was complicated by acute kidney injury, secondary bacterial E. Coli pneumonia and sepsis. In addition to ventilation and antibiotics, specific treatment included Actemra, Remdesiver, dexamethasone, ECMO including a second circuit. He of course was anticoagulated. He developed conjuctival hemorrhages in both eyes. He had delerium and agitation and had an MRI head on  that showed a bifrontal SAH and layering bilateral temporal intraventricular hemorrhage. He did have a CTA head on  that did not show any aneurysm. He was discharged to [facility] on . He had tracheostomy and G tube placement. He has been home since . He has had a remarkable recovery. He remains on 2LPM supplemental oxygen in the daytime and 4LPM at night. He is ambulatory and independent with personal care. He is even back to work from home at least. He has lost weight. He has to sleep in a recliner as her has apneas that waken him frequently when sleeping supine.  He has daytime drowsiness. He denies headaches. Height:5'7.5\"  Weight: 272 lbs  BMI: 41.85  Neck Circ:  20.5\"  MALLAMPATI: Type 4  ESS: 6/24     Type of Study: PSG/Split  Time Stage Position Snore Hypopnea Obs Apnea Laila Apnea PAP O2   2100 Awake Supine No No No No  RA   2200 Awake Supine No No No No  RA   2300 2 Supine No Yes Yes No  RA   2400 REM Left Yes Yes Yes No  RA   0100 2 Left No No No No Cpap 9 RA   0200 REM Left No Yes No No Bipap 14/10 RA   0300 2 Right No No No No Bipap 17/13 RA   0400 REM Right No Yes No No Bipap 20/16 RA   0420 2 Supine No No No No Bipap 20/16 RA     Summary: Pt stated that he uses oxygen @2 lpm during the day and 4lpm at night. Pts study performed on room air. Pt had several events with snores. Tech in to begin titration. Pt tolerated mask well. DME: [facility]     Final PAP settings: Bipap 20/16   Mask Type: Full face   Mask: Vitera   Mask Size: Large    The study was reviewed briefly with Sara Terry. He will be notified of the formal results and recommendations after the study is scored and interpreted. The report will be sent to his referring provider.     Technician: TAYA Avilez

## 2022-03-03 ENCOUNTER — OFFICE VISIT (OUTPATIENT)
Dept: NEUROLOGY | Age: 37
End: 2022-03-03
Payer: COMMERCIAL

## 2022-03-03 VITALS
RESPIRATION RATE: 18 BRPM | SYSTOLIC BLOOD PRESSURE: 118 MMHG | HEART RATE: 100 BPM | WEIGHT: 290 LBS | DIASTOLIC BLOOD PRESSURE: 83 MMHG | BODY MASS INDEX: 43.95 KG/M2 | HEIGHT: 68 IN

## 2022-03-03 DIAGNOSIS — G47.33 SLEEP APNEA, OBSTRUCTIVE: Primary | ICD-10-CM

## 2022-03-03 DIAGNOSIS — I61.5 INTRAVENTRICULAR HEMORRHAGE (HCC): ICD-10-CM

## 2022-03-03 DIAGNOSIS — I60.9 SUBARACHNOID HEMORRHAGE (HCC): ICD-10-CM

## 2022-03-03 DIAGNOSIS — R09.02 HYPOXEMIA: ICD-10-CM

## 2022-03-03 PROCEDURE — 99213 OFFICE O/P EST LOW 20 MIN: CPT | Performed by: PSYCHIATRY & NEUROLOGY

## 2022-03-03 NOTE — PROGRESS NOTES
[facility] Neurology  [address] [place] [address] Drive, [address]  [place], [place]  Phone [phone]  Fax [phone]     [facility] Neurology Follow Up Encounter  3/3/22 3:17 PM CST    Information:   Patient Name: Aviva Marrero  :   1985  Age:   31 y.o. MRN:   [id number]  Account #:  [de-identified]  Today:  3/3/22    Provider: Maryclare Litten, M.D. Chief Complaint:   Chief Complaint   Patient presents with    Follow-up       Subjective:   Aviva Marrero is a 31 y.o. man with a history of intracranial hemorrhage during ECMO treatment duirng COVID-19 who is following up. He continues to improve. He still sleeps with oxygen but does not use it during the daytime. He is working from home. He has had no headaches. He did have a sleep study the other night. It has not been scored yet. He snores and has been witnessed to have apneas during sleep. Objective:     Past Medical History:  Past Medical History:   Diagnosis Date    Asthma     Morbid exogenous obesity ([address]     Palliative care patient 2021       Past Surgical History:   Procedure Laterality Date    EXTRACORPOREAL CIRCULATION      TRACHEOSTOMY         Recent Hospitalizations  · None    Significant Injuries  · None    Habits  Aviva Marrero reports that he has never smoked. He has never used smokeless tobacco. He reports current alcohol use. He reports previous drug use. History reviewed. No pertinent family history. Social History  [employer] is , lives in [address] [place] [address], and works from home.     Medications:  Current Outpatient Medications   Medication Sig Dispense Refill    albuterol sulfate  (90 Base) MCG/ACT inhaler Inhale 6 puffs into the lungs every 4 hours as needed      gabapentin (NEURONTIN) 100 MG capsule TAKE 1 TO 2 CAPSULES BY MOUTH EVERY DAY AT BEDTIME      ergocalciferol (ERGOCALCIFEROL) 1.25 MG (17673 UT) capsule TAKE 1 CAPSULE BY MOUTH ONCE WEEKLY ON THE SAME DAY EACH WEEK      Multiple Vitamins-Minerals (THERAPEUTIC MULTIVITAMIN-MINERALS) tablet Take 1 tablet by mouth daily       No current facility-administered medications for this visit. Allergies: Allergies as of 03/03/2022 - Fully Reviewed 03/03/2022   Allergen Reaction Noted    Sulfa antibiotics Rash 09/01/2021       Review of Systems:  Constitutional: negative for - chills and fever  Eyes:  negative for - visual disturbance and photophobia  HENMT: negative for - headaches and sinus pain  Respiratory: negative for - cough, hemoptysis, and shortness of breath  Cardiovascular: negative for - chest pain and palpitations  Gastrointestinal: negative for - blood in stools, constipation, diarrhea, nausea, and vomiting  Genito-Urinary: negative for - hematuria, urinary frequency, urinary urgency, and urinary retention  Musculoskeletal: negative for - joint pain, joint stiffness, and joint swelling  Hematological and Lymphatic: negative for - bleeding problems, abnormal bruising, and swollen lymph nodes  Endocrine:  negative for - polydipsia and polyphagia  Allergy/Immunology:  negative for - rhinorrhea, sinus congestion, hives  Integument:  negative for - negative for - rash, change in moles, new or changing lesions  Psychological: negative for - anxiety and depression  Neurological: negative for - memory loss, numbness/tingling, and weakness     PHYSICAL EXAMINATION:  Vitals:  /83   Pulse 100   Resp 18   Ht 5' 8\" (1.727 m)   Wt 290 lb (131.5 kg)   BMI 44.09 kg/m²   General appearance:  Alert, well developed, well nourished, in no distress  HEENT:  normocephalic, atraumatic, sclera appear normal, no nasal abnormalities, no rhinorrhea, Ears appear normal, oral mucous membranes are moist without erythema, trachea midline, thyroid is normal, no lymphadenopathy or neck mass. Cardiovascular:  Regular rate and rhythm without murmer. No peripheral edema, No cyanosis or clubbing. No carotid bruits.   Pulmonary:  Lungs are clear to auscultation. Breathing appears normal, good expansion, normal effort without use of accessory muscles  Musculoskeletal:  Joints are normal.  No splints, slings, or casts. Spine appears normal with normal posture and range of motion. Integument:  No rash, erythema, or pallor  Psychiatric:  Mood, affect, and behavior appear normal      NEUROLOGIC EXAMINATION:  Mental Status:  alert, oriented to person, place, and time. Speech:  Clear without dysarthria or dysphonia  Language:  Fluent without aphasia  Cranial Nerves:   II Visual fields are full to confrontation   III,IV, VI Extraocular movements are full   VII Facial movements are symmetrical without weakness   VIII Hearing is intact   IX,X Shoulder shrug and head rotation strength are intact   XII No tongue atrophy or fasciculations. Normal tongue protrusion. No tongue weakness  Motor:  Normal strength in both upper and lower extremities. Normal muscle tone and bulk. Deep tendon reflexes are intact and symmetrical in both upper and lower extremities. Miller's signs are absent bilaterally. There is no ankle clonus on either side. Sensation:  Sensation is intact to light touch, temperature, and vibration in all extremities. Coordination:  Rapid alternating movements are normal in both upper and lower extremities. Finger to nose testing is unimpaired bilaterally. Gait:  Normal station and gait. Pertinent Diagnostic Studies:  Narrative   EXAMINATION: CT head without contrast 11/29/2021   HISTORY: Recent intracranial hemorrhage. Dose: 760.14 mGycm. All CT scans are performed using dose optimization   techniques as appropriate to the performed exam and include at least   one of the following: Automated exposure control, adjustment of the mA   and/or kV according to size, and the use of the iterative   reconstruction technique.    FINDINGS: Multiple contiguous axial images are obtained from the skull   base to the vertex per protocol without obvious contrast enhancement. Reformatted images are obtained in sagittal coronal projections from   the original data set. Previously described intraventricular hemorrhage and right frontal   convexity subarachnoid hemorrhage is no longer visualized and has   resolved in its entirety. There is no evidence of hydrocephalus. There   is no mass, mass effect or shift of the midline. No evidence of acute   infarct or hemorrhage. The visualized paranasal sinuses and mastoid air cells demonstrate   normal aeration. There is no fluid in the middle ear cavity. The   orbits are intact.       Impression   1.. Normal nonenhanced CT of the brain. 2. Previously described right frontal convexity subarachnoid   hemorrhage and intraventricular hemorrhage have resolved. Signed by Dr Raudel Guzman     PSG showed severe ANNETTE. He was titrated to high BiPAP pressures when supine. Assessment:       ICD-10-CM    1. Sleep apnea, obstructive  G47.33    2. Hypoxemia  R09.02    3. Subarachnoid hemorrhage (HCC)  I60.9    4. Intraventricular hemorrhage (HCC)  I61.5    He did have a CTA head so he does not have an AVM or aneurysm. I discussed the diagnosis of obstructive sleep apnea with Tiffany Roe including the pathophysiology (namely the mechanism of breathing and obstruction of upper airway, interruptions of sleep, hypoxemia, hypercapnia, and results of repetitive sympathetic activation), risks, evaluation, and treatment options. Plan:   1. Set up on auto BiPAP  2. Fu within 3 months of follow up to assess compliance and treatment effect.     Electronically signed by Sugar Ramos MD on 3/3/22

## 2022-03-16 PROCEDURE — 95811 POLYSOM 6/>YRS CPAP 4/> PARM: CPT | Performed by: PSYCHIATRY & NEUROLOGY

## 2022-03-17 ENCOUNTER — TELEPHONE (OUTPATIENT)
Dept: NEUROLOGY | Age: 37
End: 2022-03-17

## 2022-03-17 NOTE — TELEPHONE ENCOUNTER
The pt requested the sleep study, office notes, and order w/settings send to [facility]. The fax number [phone].

## 2022-03-21 NOTE — TELEPHONE ENCOUNTER
Sleep Study report, DME order, Ins Card, OV note, Demographics has been faxed to [facility].

## 2022-08-08 ENCOUNTER — TELEPHONE (OUTPATIENT)
Dept: NEUROLOGY | Age: 37
End: 2022-08-08

## 2022-08-08 NOTE — TELEPHONE ENCOUNTER
Patient is requesting a return call from the office in regards to getting a sooner appt for his BI Bap machine. Patient states he is available after next week. Please return his call. Thank you!

## 2022-08-09 NOTE — TELEPHONE ENCOUNTER
Left a voicemail letting Bettye Carrillo know that Dr. Fidelia Collins does not have any appointments available sooner than what he is already scheduled.

## 2022-09-16 ENCOUNTER — TELEPHONE (OUTPATIENT)
Dept: NEUROLOGY | Age: 37
End: 2022-09-16

## 2022-09-16 NOTE — TELEPHONE ENCOUNTER
I have called and left patient a VM to get a current download report of his DME sleep machine for his appointment.

## 2022-10-24 ENCOUNTER — OFFICE VISIT (OUTPATIENT)
Dept: NEUROLOGY | Age: 37
End: 2022-10-24
Payer: COMMERCIAL

## 2022-10-24 VITALS
RESPIRATION RATE: 20 BRPM | BODY MASS INDEX: 46.98 KG/M2 | DIASTOLIC BLOOD PRESSURE: 89 MMHG | WEIGHT: 310 LBS | HEART RATE: 80 BPM | HEIGHT: 68 IN | SYSTOLIC BLOOD PRESSURE: 138 MMHG

## 2022-10-24 DIAGNOSIS — G47.33 SLEEP APNEA, OBSTRUCTIVE: Primary | ICD-10-CM

## 2022-10-24 PROCEDURE — 99213 OFFICE O/P EST LOW 20 MIN: CPT | Performed by: PSYCHIATRY & NEUROLOGY

## 2022-10-24 RX ORDER — FAMOTIDINE 20 MG/1
TABLET, FILM COATED ORAL
COMMUNITY

## 2022-10-24 NOTE — PROGRESS NOTES
[id number] [facility]  [address] [place] [address] Drive, [address] [facility] [address]  [address]  Phone [phone]  Fax [phone]     [address], [address] Follow Up Encounter  10/24/22 1:30 PM CDT    Information:   Patient Name: Chay Workman  :   1985  Age:   30 y.o. MRN:   [id number]  Account #:  [de-identified]  Today:  10/24/22    Provider: Darrell Durham M.D. Chief Complaint:   Chief Complaint   Patient presents with    Follow-up    Sleep Apnea       Subjective:   Chay Workman is a 30 y.o. man with a history of obstructive sleep apnea who is following up. He was referred for a polysomnogram due to snoring, witnessed apneas, and excessive daytime somnolence. The PSG showed severe obstructive sleep apnea with an AHI of 113.6 with low oxygen saturation of 59%. He was titrated to BiPAP at 20/16 and set up on an auto BiPAPat 8cm to 22cm with pressure support of 5cm to 6cm. He is using the device every night. He is resting better. He now denies daytime drowsiness. Objective:     Past Medical History:  Past Medical History:   Diagnosis Date    Asthma     Morbid exogenous obesity ([facility] [address]     Palliative care patient 2021       Past Surgical History:   Procedure Laterality Date    EXTRACORPOREAL CIRCULATION      TRACHEOSTOMY         Recent Hospitalizations  None    Significant Injuries  None    Habits  Chay Workman reports that he has never smoked. He has never used smokeless tobacco. He reports current alcohol use. He reports that he does not currently use drugs. No family history on file. Social History  Sarah Kate is , lives in [address] [place] [address], and works from home.     Medications:  Current Outpatient Medications   Medication Sig Dispense Refill    famotidine (PEPCID) 20 MG tablet Take by mouth      ergocalciferol (ERGOCALCIFEROL) 1.25 MG (84040 UT) capsule TAKE 1 CAPSULE BY MOUTH ONCE WEEKLY ON THE SAME DAY EACH WEEK      Multiple Vitamins-Minerals (THERAPEUTIC MULTIVITAMIN-MINERALS) tablet Take 1 tablet by mouth daily      gabapentin (NEURONTIN) 100 MG capsule TAKE 1 TO 2 CAPSULES BY MOUTH EVERY DAY AT BEDTIME (Patient not taking: Reported on 10/24/2022)       No current facility-administered medications for this visit. Allergies: Allergies as of 10/24/2022 - Fully Reviewed 10/24/2022   Allergen Reaction Noted    Sulfa antibiotics Rash 09/01/2021       Review of Systems:  Constitutional: negative for - chills and fever  Eyes:  negative for - visual disturbance and photophobia  HENMT: negative for - headaches and sinus pain  Respiratory: negative for - cough, hemoptysis, and shortness of breath  Cardiovascular: negative for - chest pain and palpitations  Gastrointestinal: negative for - blood in stools, constipation, diarrhea, nausea, and vomiting  Genito-Urinary: negative for - hematuria, urinary frequency, urinary urgency, and urinary retention  Musculoskeletal: negative for - joint pain, joint stiffness, and joint swelling  Hematological and Lymphatic: negative for - bleeding problems, abnormal bruising, and swollen lymph nodes  Endocrine:  negative for - polydipsia and polyphagia  Allergy/Immunology:  negative for - rhinorrhea, sinus congestion, hives  Integument:  negative for - negative for - rash, change in moles, new or changing lesions  Psychological: negative for - anxiety and depression  Neurological: negative for - memory loss, numbness/tingling, and weakness     PHYSICAL EXAMINATION:  Vitals:  /89   Pulse 80   Resp 20   Ht 5' 8\" (1.727 m)   Wt (!) 310 lb (140.6 kg)   BMI 47.14 kg/m²   General appearance:  Alert, well developed, well nourished, in no distress  HEENT:  normocephalic, atraumatic, sclera appear normal, no nasal abnormalities, no rhinorrhea, Ears appear normal, oral mucous membranes are moist without erythema, trachea midline, thyroid is normal, no lymphadenopathy or neck mass. IV (only hard palate visible).   Cardiovascular: Regular rate and rhythm without murmer. No peripheral edema, No cyanosis or clubbing. No carotid bruits. Pulmonary:  Lungs are clear to auscultation. Breathing appears normal, good expansion, normal effort without use of accessory muscles  Musculoskeletal:  Joints are normal.  No splints, slings, or casts. Spine appears normal with normal posture and range of motion. Integument:  No rash, erythema, or pallor  Psychiatric:  Mood, affect, and behavior appear normal      NEUROLOGIC EXAMINATION:  Mental Status:  alert, oriented to person, place, and time. Speech:  Clear without dysarthria or dysphonia  Language:  Fluent without aphasia  Cranial Nerves:   II Visual fields are full to confrontation   III,IV, VI Extraocular movements are full   VII Facial movements are symmetrical without weakness   VIII Hearing is intact   XII No tongue atrophy or fasciculations. Normal tongue protrusion. No tongue weakness  Motor:  Normal strength in both upper and lower extremities. Normal muscle tone and bulk. Deep tendon reflexes are intact and symmetrical in both upper and lower extremities. Miller's signs are absent bilaterally. There is no ankle clonus on either side. Sensation:  Sensation is intact to light touch and vibration in all extremities. Coordination:  Rapid alternating movements are normal in both upper and lower extremities. Finger to nose testing is unimpaired bilaterally. Gait:  Normal station and gait. Pertinent Diagnostic Studies:      BiPAP download shows that he is 100% compliant. Unfortunately his [email or web address], did not set him up on what was ordered. They set him up on an IPAP of 22cm and an EPAP of 8cm with a BUR of 10. Assessment:       ICD-10-CM    1. Sleep apnea, obstructive  G47.33       He is objectively compliant with and clinically benefiting from BiPAP use. Unfortunately, [facility] has him on the wrong device and wrong settings.     I discussed the diagnosis of obstructive sleep apnea with Flordia Monday including the pathophysiology (namely the mechanism of breathing and obstruction of upper airway, interruptions of sleep, hypoxemia, hypercapnia, and results of repetitive sympathetic activation), risks, evaluation, and treatment options. I discussed the risks of driving when drowsy and advised that Flordia Monday not drive when drowsy and avoid sedating medications and respiratory suppressants. Plan:   1. Need to change to an auto adjusting BiPAP with min EPAP of 8cm, max IPAP of 22cm, and PS of 5 to 6 cm.  2. Follow up in 6 months.       Electronically signed by Jamir Chapa MD on 10/24/22

## 2023-08-02 ENCOUNTER — TRANSCRIBE ORDERS (OUTPATIENT)
Dept: ADMINISTRATIVE | Facility: HOSPITAL | Age: 38
End: 2023-08-02
Payer: COMMERCIAL

## 2023-08-02 DIAGNOSIS — R06.00 DYSPNEA, UNSPECIFIED TYPE: Primary | ICD-10-CM

## 2023-08-03 ENCOUNTER — TRANSCRIBE ORDERS (OUTPATIENT)
Dept: ADMINISTRATIVE | Facility: HOSPITAL | Age: 38
End: 2023-08-03
Payer: COMMERCIAL

## 2023-08-03 DIAGNOSIS — R91.8 OTHER NONSPECIFIC ABNORMAL FINDING OF LUNG FIELD: Primary | ICD-10-CM

## 2023-09-11 ENCOUNTER — TELEPHONE (OUTPATIENT)
Dept: NEUROLOGY | Age: 38
End: 2023-09-11

## 2023-09-11 NOTE — TELEPHONE ENCOUNTER
Patient called requesting to move 9/26 sleep appt to Oct. [address] not able to accommodate.  Please return his call to discuss [phone]      Thank you

## 2023-09-12 NOTE — TELEPHONE ENCOUNTER
Returned call, had to leave a voicemail letting patient know Dr. Michael Cloud does not have any openings in October, he is scheduling out to January. Asked patient to call the office back to let us know if he still wants to reschedule.

## 2024-01-12 ENCOUNTER — TELEPHONE (OUTPATIENT)
Dept: NEUROLOGY | Age: 39
End: 2024-01-12

## 2024-01-12 NOTE — TELEPHONE ENCOUNTER
Called and left patient a  to confirm his appointment for 01-16-24 with Dr. Sosa and to take his SD card from his sleep machine to [facility] for them to do a current download report for his appointment.

## 2024-01-15 ENCOUNTER — TELEPHONE (OUTPATIENT)
Dept: NEUROLOGY | Age: 39
End: 2024-01-15

## 2024-01-15 NOTE — TELEPHONE ENCOUNTER
Rex called to reschedule a his appt for tomorrow due to trouble with cpap; unable to get readings.     Please be advised that the best time to call him to accommodate their needs is Anytime.     Thank you.

## 2024-01-16 NOTE — TELEPHONE ENCOUNTER
Called and left patient a VM to call our office back to get his appointment rescheduled.     Please schedule patient at the next follow up appointment open slot. When patient calls back to reschedule.